# Patient Record
Sex: FEMALE | Race: BLACK OR AFRICAN AMERICAN | Employment: OTHER | URBAN - METROPOLITAN AREA
[De-identification: names, ages, dates, MRNs, and addresses within clinical notes are randomized per-mention and may not be internally consistent; named-entity substitution may affect disease eponyms.]

---

## 2017-01-25 ENCOUNTER — OFFICE VISIT (OUTPATIENT)
Dept: CARDIOLOGY CLINIC | Age: 82
End: 2017-01-25

## 2017-01-25 VITALS
HEART RATE: 64 BPM | DIASTOLIC BLOOD PRESSURE: 68 MMHG | WEIGHT: 104 LBS | HEIGHT: 60 IN | OXYGEN SATURATION: 99 % | SYSTOLIC BLOOD PRESSURE: 157 MMHG | BODY MASS INDEX: 20.42 KG/M2

## 2017-01-25 DIAGNOSIS — R26.81 GAIT INSTABILITY: ICD-10-CM

## 2017-01-25 DIAGNOSIS — K21.00 GASTROESOPHAGEAL REFLUX DISEASE WITH ESOPHAGITIS: ICD-10-CM

## 2017-01-25 DIAGNOSIS — E78.00 HYPERCHOLESTEREMIA: ICD-10-CM

## 2017-01-25 DIAGNOSIS — I10 ESSENTIAL HYPERTENSION: ICD-10-CM

## 2017-01-25 DIAGNOSIS — R00.2 PALPITATIONS: Primary | ICD-10-CM

## 2017-01-25 NOTE — PROGRESS NOTES
[facility]   [address] [facility], [address] [place] [address]                                          NEW PATIENT HPI/FOLLOW-UP      NAME:  Si Primer   :   1935   MRN:   [id number]   PCP:  Stacy Luis MD           Subjective: The patient is a [de-identified]y.o. year old female  who returns for a routine follow-up. Since the last visit, patient reports no new symptoms. Denies change in exercise tolerance, chest pain, edema, medication intolerance, palpitations, shortness of breath, PND/orthopnea wheezing, sputum, syncope, dizziness or light headedness. Doing satisfactorily. Review of Systems  General: Pt denies excessive weight gain or loss. Pt is able to conduct ADL's. Respiratory: Denies shortness of breath, EVANS, wheezing or stridor.   Cardiovascular: Denies precordial pain, +palpitations, edema or PND  Gastrointestinal: Denies poor appetite, indigestion, abdominal pain or blood in stool  Peripheral vascular: Denies claudication, leg cramps  Neuropsychiatric: Denies paresthesias,tingling,numbness,anxiety,depression,fatigue  Musculoskeletal: Denies pain,tenderness, soreness,swelling      Past Medical History   Diagnosis Date    GERD (gastroesophageal reflux disease)      EGD- reflux esophagitis    Hypercholesterolemia     Hypertension     Osteopenia 11/15/2011    Pulmonary embolus (HCC)      hx. of PE after surgery    Thyroid disease      Patient Active Problem List    Diagnosis Date Noted    Rapid palpitations 2016    Gait instability--uses roller walker 2016    SBO (small bowel obstruction) ([address] 2013    Esophageal mass 2013    Osteopenia 11/15/2011    Hernia of unspecified site of abdominal cavity without mention of obstruction or gangrene 2011    Respiratory failure ([address] 2010    Hypertension 2010    Hypercholesteremia 2010    Pulmonary embolus ([address] 2010    GERD (gastroesophageal reflux disease) Past Surgical History   Procedure Laterality Date    Hx hysterectomy      Pr freeing bowel adhesion,enterolysis      Pr repair ing hernia,5+y/o,reducibl  5/11/11    Colonoscopy       -internal hemorrhoids    Hx gi       bowel obstruction 12-    Hx small bowel resection       4-18-10    Hx hernia repair  5/11/11    Hx orthopaedic  2011     toe sx-bilateral     Allergies   Allergen Reactions    Hydrochlorothiazide Other (comments)     dizziness      No family history on file. Social History     Social History    Marital status:      Spouse name: N/A    Number of children: N/A    Years of education: N/A     Occupational History    Not on file. Social History Main Topics    Smoking status: Never Smoker    Smokeless tobacco: Never Used    Alcohol use No    Drug use: No    Sexual activity: Not on file     Other Topics Concern    Not on file     Social History Narrative    Currently lives by herself, brought in by sister. Sister lives 5 minutes away. Current Outpatient Prescriptions   Medication Sig    losartan (COZAAR) 50 mg tablet Take 1 Tab by mouth daily. For blood pressure    levothyroxine (SYNTHROID) 50 mcg tablet take 1 tablet by mouth once daily    indapamide (LOZOL) 2.5 mg tablet take 1 tablet by mouth once daily for blood pressure    atorvastatin (LIPITOR) 20 mg tablet take 1 tablet by mouth daily for cholesterol    alendronate (FOSAMAX) 70 mg tablet take 1 tablet by mouth EVERY MONDAY ON AN EMPTY STOMACH WITH 8 OZ OF WATER. REMAIN UPRIGHT FOR 30 MINUTES AFTERWARDS    SORE THROAT, BENZOCAINE-MENTH, 15-2.6 mg lozg lozenge as needed.     verapamil ER (CALAN-SR) 240 mg CR tablet take 1 tablet by mouth once daily for blood pressure    omeprazole (PRILOSEC) 40 mg capsule take 1 capsule by mouth once daily  FOR INDIGESTION    HI-CINTHIA PLUS VIT D 500 mg(1,250mg) -200 unit per tablet take 1 tablet by mouth twice a day    multivitamins-ca-iron-minerals (ONE DAILY) Tab Take one tablet by mouth once daily. No current facility-administered medications for this visit. I have reviewed the nurses notes, vitals, problem list, allergy list, medical history, family medical, social history and medications. Objective:     Physical Exam:     Vitals:    01/25/17 1018   BP: 157/68   Pulse: 64   SpO2: 99%   Weight: 104 lb (47.2 kg)   Height: 5' (1.524 m)    Body mass index is 20.31 kg/(m^2). General: Well developed, in no acute distress,in walker,wheelchair. HEENT: No carotid bruits, no JVD, trach is midline. Heart:  Normal S1/S2 negative S3 or S4. Regular, no murmur, gallop or rub.   Respiratory: Clear bilaterally, no wheezing or rales  Abdomen:   Soft, non-tender, bowel sounds are active.   Extremities:  No edema, normal cap refill, no cyanosis. Neuro: A&Ox3, speech clear, gait stable. Skin: Skin color is normal. No rashes or lesions. No diaphoresis.   Vascular: 2+ pulses symmetric in all extremities        Data Review:       Cardiographics:    Cardiology Labs:    Results for orders placed or performed during the hospital encounter of 03/31/13   EKG, 12 LEAD, INITIAL   Result Value Ref Range    Ventricular Rate 78 BPM    Atrial Rate 78 BPM    P-R Interval 114 ms    QRS Duration 92 ms    Q-T Interval 374 ms    QTC Calculation (Bezet) 426 ms    Calculated R Axis 62 degrees    Calculated T Axis 34 degrees    Diagnosis       Normal sinus rhythm  When compared with ECG of 19-APR-2010 10:27,  Sinus rhythm has replaced Junctional rhythm  Confirmed by Brook Escobar ([id number]) on 4/1/2013 1:05:06 PM   Results for orders placed or performed in visit on 03/22/12   AMB POC EKG ROUTINE W/ 12 LEADS, INTER & REP    Impression    Pvcs, nsr       Lab Results   Component Value Date/Time    Cholesterol, total 182 07/15/2016 11:06 AM    HDL Cholesterol 122 07/15/2016 11:06 AM    LDL, calculated 48 07/15/2016 11:06 AM    Triglyceride 59 07/15/2016 11:06 AM    CHOL/HDL Ratio 2.5 01/30/2009 03:11 PM       Lab Results   Component Value Date/Time    Sodium 139 07/15/2016 11:06 AM    Potassium 4.5 07/15/2016 11:06 AM    Chloride 98 07/15/2016 11:06 AM    CO2 25 07/15/2016 11:06 AM    Anion gap 11 04/10/2013 05:30 AM    Glucose 97 07/15/2016 11:06 AM    BUN 22 07/15/2016 11:06 AM    Creatinine 0.95 07/15/2016 11:06 AM    BUN/Creatinine ratio 23 07/15/2016 11:06 AM    GFR est AA 65 07/15/2016 11:06 AM    GFR est non-AA 57 07/15/2016 11:06 AM    Calcium 9.6 07/15/2016 11:06 AM    Bilirubin, total 0.2 07/15/2016 11:06 AM    ALT 23 07/15/2016 11:06 AM    AST 28 07/15/2016 11:06 AM    Alk. phosphatase 82 07/15/2016 11:06 AM    Protein, total 7.1 07/15/2016 11:06 AM    Albumin 4.1 07/15/2016 11:06 AM    Globulin 4.0 04/01/2013 02:35 AM    A-G Ratio 1.4 07/15/2016 11:06 AM          Assessment:     No diagnosis found. Discussion: Patient presents at this time stable from a cardiac perspective. Appropriate HR response to activity unchanged. Reassured. Pleased with present status. Plan: 1. Continue same meds. Lipid profile and labs followed by PCP. 2.Encouraged to exercise to tolerance, lose weight and follow low fat, low cholesterol, low sodium predominantly Plant-based (consider Mediterranean) diet. Call with questions or concerns. Will follow up any test results by phone and/or f/u here in office if needed. Lisa Sneed 3.Follow up: 1 month    I have discussed the diagnosis with the patient and the intended plan as seen in the above orders. The patient has received an after-visit summary and questions were answered concerning future plans. I have discussed any concerning medication side effects and warnings with the patient as well.     Stacy Ramíerz MD  1/25/2017

## 2017-01-25 NOTE — MR AVS SNAPSHOT
Visit Information Date & Time Provider Department Dept. Phone Encounter #  
 1/25/2017 10:15 AM Maricruz Weinstein MD [facility] [place] [address] [id number] Your Appointments 3/29/2017  9:30 AM  
ROUTINE CARE with Andrae Markham MD  
[facility]) Appt Note: 4 month follow up  
 [address] [facility] [id number] [phone] [address] [place] [address] [address] Upcoming Health Maintenance Date Due  
 GLAUCOMA SCREENING Q2Y 8/1/2016 MEDICARE YEARLY EXAM 1/15/2017 DTaP/Tdap/Td series (2 - Td) 6/22/2025 Allergies as of 1/25/2017  Review Complete On: 1/25/2017 By: Laurie Yoder Severity Noted Reaction Type Reactions Hydrochlorothiazide  03/01/2010    Other (comments)  
 dizziness Current Immunizations  Reviewed on 1/15/2016 Name Date Influenza High Dose Vaccine PF 9/28/2015, 10/2/2014 Influenza Vaccine 9/14/2016, 9/9/2013 Influenza Vaccine Split 9/24/2012, 9/19/2011, 9/21/2010, 1/1/2009 Pneumococcal Conjugate (PCV-13) 10/26/2016 Pneumococcal Vaccine (Unspecified Type) 1/1/2009 Tdap 6/22/2015 Varicella Virus Vaccine 11/10/2015 Not reviewed this visit Vitals BP Pulse Height(growth percentile) Weight(growth percentile) SpO2 BMI  
 157/68 64 5' (1.524 m) 104 lb (47.2 kg) 99% 20.31 kg/m2 OB Status Smoking Status Hysterectomy Never Smoker Vitals History BMI and BSA Data Body Mass Index Body Surface Area  
 20.31 kg/m 2 1.41 m 2 Preferred Pharmacy Pharmacy Name Phone [phone] [place] [phone] Your Updated Medication List  
  
   
This list is accurate as of: 1/25/17 11:00 AM.  Always use your most recent med list.  
  
  
  
  
 alendronate 70 mg tablet Commonly known as:  FOSAMAX take 1 tablet by mouth EVERY MONDAY ON AN EMPTY STOMACH WITH 8 OZ OF WATER. REMAIN UPRIGHT FOR 30 MINUTES AFTERWARDS  
  
 atorvastatin 20 mg tablet Commonly known as:  LIPITOR  
take 1 tablet by mouth daily for cholesterol HI-CINTHIA PLUS VIT D 500 mg(1,250mg) -200 unit per tablet Generic drug:  calcium-vitamin D  
take 1 tablet by mouth twice a day  
  
 indapamide 2.5 mg tablet Commonly known as:  LOZOL  
take 1 tablet by mouth once daily for blood pressure  
  
 levothyroxine 50 mcg tablet Commonly known as:  SYNTHROID  
take 1 tablet by mouth once daily  
  
 losartan 50 mg tablet Commonly known as:  COZAAR Take 1 Tab by mouth daily. For blood pressure  
  
 multivitamins-ca-iron-minerals Tab Commonly known as:  ONE DAILY Take one tablet by mouth once daily. omeprazole 40 mg capsule Commonly known as:  PRILOSEC  
take 1 capsule by mouth once daily  FOR INDIGESTION  
  
 SORE THROAT (BENZOCAINE-MENTH) 15-2.6 mg Lozg lozenge Generic drug:  benzocaine-menthol  
as needed. verapamil  mg CR tablet Commonly known as:  CALAN-SR  
take 1 tablet by mouth once daily for blood pressure Introducing [facility]! Velia Thomason introduces [employer] patient portal. Now you can access parts of your medical record, email your doctor's office, and request medication refills online. 1. In your internet browser, go to [email or web address] 2. Click on the First Time User? Click Here link in the Sign In box. You will see the New Member Sign Up page. 3. Enter your [employer] Access Code exactly as it appears below. You will not need to use this code after youve completed the sign-up process. If you do not sign up before the expiration date, you must request a new code. · [employer] Access Code: [email or web address] Expires: 2/26/2017  9:48 AM 
 
4.  Enter the last four digits of your Social Security Number (xxxx) and Date of Birth (mm/dd/yyyy) as indicated and click Submit. You will be taken to the next sign-up page. 5. Create a Mindshapes ID. This will be your Mindshapes login ID and cannot be changed, so think of one that is secure and easy to remember. 6. Create a Mindshapes password. You can change your password at any time. 7. Enter your Password Reset Question and Answer. This can be used at a later time if you forget your password. 8. Enter your e-mail address. You will receive e-mail notification when new information is available in [address] 9. Click Sign Up. You can now view and download portions of your medical record. 10. Click the Download Summary menu link to download a portable copy of your medical information. If you have questions, please visit the Frequently Asked Questions section of the Mindshapes website. Remember, Mindshapes is NOT to be used for urgent needs. For medical emergencies, dial 911. Now available from your iPhone and Android! Please provide this summary of care documentation to your next provider. Your primary care clinician is listed as Pablo Seats. If you have any questions after today's visit, please call [phone].

## 2017-02-13 RX ORDER — VERAPAMIL HYDROCHLORIDE 240 MG/1
TABLET, FILM COATED, EXTENDED RELEASE ORAL
Qty: 90 TAB | Refills: 11 | Status: SHIPPED | OUTPATIENT
Start: 2017-02-13 | End: 2018-05-20 | Stop reason: SDUPTHER

## 2017-03-29 ENCOUNTER — OFFICE VISIT (OUTPATIENT)
Dept: FAMILY MEDICINE CLINIC | Age: 82
End: 2017-03-29

## 2017-03-29 VITALS
HEIGHT: 60 IN | RESPIRATION RATE: 14 BRPM | DIASTOLIC BLOOD PRESSURE: 60 MMHG | HEART RATE: 65 BPM | WEIGHT: 118.6 LBS | BODY MASS INDEX: 23.29 KG/M2 | TEMPERATURE: 98.4 F | SYSTOLIC BLOOD PRESSURE: 141 MMHG | OXYGEN SATURATION: 99 %

## 2017-03-29 DIAGNOSIS — I10 ESSENTIAL HYPERTENSION: ICD-10-CM

## 2017-03-29 DIAGNOSIS — E78.00 HYPERCHOLESTEREMIA: ICD-10-CM

## 2017-03-29 DIAGNOSIS — Z00.00 ENCOUNTER FOR MEDICARE ANNUAL WELLNESS EXAM: Primary | ICD-10-CM

## 2017-03-29 LAB
BILIRUB UR QL STRIP: NEGATIVE
GLUCOSE UR-MCNC: NEGATIVE MG/DL
KETONES P FAST UR STRIP-MCNC: NEGATIVE MG/DL
PH UR STRIP: 6.5 [PH] (ref 4.6–8)
PROT UR QL STRIP: NEGATIVE MG/DL
SP GR UR STRIP: 1 (ref 1–1.03)
UA UROBILINOGEN AMB POC: NORMAL (ref 0.2–1)
URINALYSIS CLARITY POC: CLEAR
URINALYSIS COLOR POC: YELLOW
URINE BLOOD POC: NEGATIVE
URINE LEUKOCYTES POC: NEGATIVE
URINE NITRITES POC: NEGATIVE

## 2017-03-29 RX ORDER — LOSARTAN POTASSIUM 100 MG/1
100 TABLET ORAL DAILY
Qty: 90 TAB | Refills: 3 | Status: SHIPPED | OUTPATIENT
Start: 2017-03-29 | End: 2017-07-27 | Stop reason: SDUPTHER

## 2017-03-29 NOTE — ACP (ADVANCE CARE PLANNING)
Pt still would like to be on vent and have feeding tubes if became terminally ill. Sister Onel Piper would be the person pt would like to make her medical decisions for her. If something happened to Onel Piper, her daughter, Venancio Barton would be next person in line to make decisions.

## 2017-03-29 NOTE — PROGRESS NOTES
Chief Complaint   Patient presents with    Hypertension    Cholesterol Problem    GERD    Thyroid Problem     1. Have you been to the ER, urgent care clinic since your last visit? Hospitalized since your last visit? No    2. Have you seen or consulted any other health care providers outside of the [address] [place] [address] since your last visit? Include any pap smears or colon screening.  No     Health Maintenance Due   Topic Date Due    GLAUCOMA SCREENING Q2Y  08/01/2016    MEDICARE YEARLY EXAM  01/15/2017

## 2017-03-29 NOTE — MR AVS SNAPSHOT
Visit Information Date & Time Provider Department Dept. Phone Encounter #  
 3/29/2017  9:30 AM Marisela Wooten MD [facility] [phone] [id number] Follow-up Instructions Return in about 6 months (around 9/29/2017). Your Appointments 7/26/2017 10:15 AM  
ESTABLISHED PATIENT with Geena Johnson MD  
[facility]) Appt Note: 6 MO. F/U  
 [address]  
[phone] [address] Upcoming Health Maintenance Date Due  
 GLAUCOMA SCREENING Q2Y 8/1/2016 MEDICARE YEARLY EXAM 1/15/2017 DTaP/Tdap/Td series (2 - Td) 6/22/2025 Allergies as of 3/29/2017  Review Complete On: 3/29/2017 By: Marisela Wooten MD  
  
 Severity Noted Reaction Type Reactions Hydrochlorothiazide  03/01/2010    Other (comments)  
 dizziness Current Immunizations  Reviewed on 1/15/2016 Name Date Influenza High Dose Vaccine PF 9/28/2015, 10/2/2014 Influenza Vaccine 9/14/2016, 9/9/2013 Influenza Vaccine Split 9/24/2012, 9/19/2011, 9/21/2010, 1/1/2009 Pneumococcal Conjugate (PCV-13) 10/26/2016 Pneumococcal Vaccine (Unspecified Type) 1/1/2009 Tdap 6/22/2015 Varicella Virus Vaccine 11/10/2015 Not reviewed this visit You Were Diagnosed With   
  
 Codes Comments Encounter for Medicare annual wellness exam    -  Primary ICD-10-CM: Z00.00 ICD-9-CM: V70.0 Essential hypertension     ICD-10-CM: I10 
ICD-9-CM: 401.9 Hypercholesteremia     ICD-10-CM: E78.00 ICD-9-CM: 272.0 Vitals BP Pulse Temp Resp Height(growth percentile) Weight(growth percentile) 141/60 65 98.4 °F (36.9 °C) (Oral) 14 5' (1.524 m) 118 lb 9.6 oz (53.8 kg) SpO2 BMI OB Status Smoking Status 99% 23.16 kg/m2 Hysterectomy Never Smoker Vitals History BMI and BSA Data Body Mass Index Body Surface Area  
 23.16 kg/m 2 1.51 m 2 Preferred Pharmacy Pharmacy Name Phone [phone] [phone] Your Updated Medication List  
  
   
This list is accurate as of: 3/29/17 10:14 AM.  Always use your most recent med list.  
  
  
  
  
 alendronate 70 mg tablet Commonly known as:  FOSAMAX  
take 1 tablet by mouth EVERY MONDAY ON AN EMPTY STOMACH WITH 8 OZ OF WATER. REMAIN UPRIGHT FOR 30 MINUTES AFTERWARDS  
  
 atorvastatin 20 mg tablet Commonly known as:  LIPITOR  
take 1 tablet by mouth daily for cholesterol HI-CINTHIA PLUS VIT D 500 mg(1,250mg) -200 unit per tablet Generic drug:  calcium-vitamin D  
take 1 tablet by mouth twice a day  
  
 indapamide 2.5 mg tablet Commonly known as:  LOZOL  
take 1 tablet by mouth once daily for blood pressure  
  
 levothyroxine 50 mcg tablet Commonly known as:  SYNTHROID  
take 1 tablet by mouth once daily  
  
 losartan 100 mg tablet Commonly known as:  COZAAR Take 1 Tab by mouth daily. For blood pressure  
  
 multivitamins-ca-iron-minerals Tab Commonly known as:  ONE DAILY Take one tablet by mouth once daily. omeprazole 40 mg capsule Commonly known as:  PRILOSEC  
take 1 capsule by mouth once daily  FOR INDIGESTION  
  
 SORE THROAT (BENZOCAINE-MENTH) 15-2.6 mg Lozg lozenge Generic drug:  benzocaine-menthol  
as needed. verapamil  mg CR tablet Commonly known as:  CALAN-SR  
take 1 tablet by mouth once daily for blood pressure Prescriptions Sent to Pharmacy Refills  
 losartan (COZAAR) 100 mg tablet 3 Sig: Take 1 Tab by mouth daily. For blood pressure Class: Normal  
 Pharmacy: [facility] [address] [place], [place] [address] [address] Ph #: [phone] Route: Oral  
  
We Performed the Following AMB POC URINALYSIS DIP STICK AUTO W/ MICRO [44352 CPT(R)] CBC WITH AUTOMATED DIFF [34265 CPT(R)] LIPID PANEL [04387 CPT(R)] METABOLIC PANEL, COMPREHENSIVE [38130 CPT(R)] Follow-up Instructions Return in about 6 months (around 9/29/2017). Introducing [address]! Nichole Ward introduces [employer] patient portal. Now you can access parts of your medical record, email your doctor's office, and request medication refills online. 1. In your internet browser, go to [email or web address] 2. Click on the First Time User? Click Here link in the Sign In box. You will see the New Member Sign Up page. 3. Enter your Webb[employer] Access Code exactly as it appears below. You will not need to use this code after youve completed the sign-up process. If you do not sign up before the expiration date, you must request a new code. · [employer] Access Code: [email or web address] Expires: 6/27/2017 10:14 AM 
 
4. Enter the last four digits of your Social Security Number (xxxx) and Date of Birth (mm/dd/yyyy) as indicated and click Submit. You will be taken to the next sign-up page. 5. Create a Webbynode ID. This will be your Webbynode login ID and cannot be changed, so think of one that is secure and easy to remember. 6. Create a Webbynode password. You can change your password at any time. 7. Enter your Password Reset Question and Answer. This can be used at a later time if you forget your password. 8. Enter your e-mail address. You will receive e-mail notification when new information is available in [address] 9. Click Sign Up. You can now view and download portions of your medical record. 10. Click the Download Summary menu link to download a portable copy of your medical information. If you have questions, please visit the Frequently Asked Questions section of the Webb[employer] website. Remember, Webb[employer]ode is NOT to be used for urgent needs. For medical emergencies, dial 911. Now available from your iPhone and Android! Please provide this summary of care documentation to your next provider. Your primary care clinician is listed as Ivy Fournier. If you have any questions after today's visit, please call [phone].

## 2017-03-29 NOTE — PROGRESS NOTES
HISTORY OF PRESENT ILLNESS  Alex Farris is a [de-identified] y.o. female. HPI In for medicare wellness exam. Needs blood pressure and cholesterol check. Going for surgery on L foot on April 19. Review of Systems   Constitutional: Negative for malaise/fatigue and weight loss. HENT: Negative for hearing loss. Respiratory: Negative for cough and shortness of breath. Nonsmoker   Cardiovascular: Negative for chest pain and orthopnea. Gastrointestinal: Negative for abdominal pain and blood in stool. Genitourinary: Negative for frequency and hematuria. Skin: Negative for itching and rash. Neurological: Negative for focal weakness, loss of consciousness and headaches. Endo/Heme/Allergies: Negative for polydipsia. Does not bruise/bleed easily. Psychiatric/Behavioral: Negative for depression. The patient is not nervous/anxious and does not have insomnia. No alcohol. Lives by herself, sister checks on her everyday. Physical Exam   Constitutional: She is oriented to person, place, and time. She appears well-developed and well-nourished. Neck: No thyromegaly present. Cardiovascular: Normal rate, regular rhythm and normal heart sounds. No murmur heard. Pulmonary/Chest: Effort normal and breath sounds normal. She has no wheezes. Br- no mass   Abdominal: Soft. Bowel sounds are normal. She exhibits no distension. There is no tenderness. There is no rebound and no guarding. Musculoskeletal: Normal range of motion. She exhibits no edema. Lymphadenopathy:     She has no cervical adenopathy. Neurological: She is alert and oriented to person, place, and time. Nursing note and vitals reviewed.       ASSESSMENT and PLAN  Orders Placed This Encounter    METABOLIC PANEL, COMPREHENSIVE    LIPID PANEL    CBC WITH AUTOMATED DIFF    AMB POC URINALYSIS DIP STICK AUTO W/ MICRO    losartan (COZAAR) 100 mg tablet     Alex was seen today for hypertension, cholesterol problem, gerd and thyroid problem. Diagnoses and all orders for this visit:    Encounter for Medicare annual wellness exam  -     METABOLIC PANEL, COMPREHENSIVE  -     CBC WITH AUTOMATED DIFF    Essential hypertension  -     AMB POC URINALYSIS DIP STICK AUTO W/ MICRO    Hypercholesteremia  -     LIPID PANEL    Other orders  -     losartan (COZAAR) 100 mg tablet; Take 1 Tab by mouth daily. For blood pressure      Follow-up Disposition:  Return in about 6 months (around 9/29/2017).

## 2017-03-30 LAB
ALBUMIN SERPL-MCNC: 4.2 G/DL (ref 3.5–4.7)
ALBUMIN/GLOB SERPL: 1.3 {RATIO} (ref 1.2–2.2)
ALP SERPL-CCNC: 74 IU/L (ref 39–117)
ALT SERPL-CCNC: 16 IU/L (ref 0–32)
AST SERPL-CCNC: 24 IU/L (ref 0–40)
BASOPHILS # BLD AUTO: 0 X10E3/UL (ref 0–0.2)
BASOPHILS NFR BLD AUTO: 1 %
BILIRUB SERPL-MCNC: <0.2 MG/DL (ref 0–1.2)
BUN SERPL-MCNC: 19 MG/DL (ref 8–27)
BUN/CREAT SERPL: 21 (ref 11–26)
CALCIUM SERPL-MCNC: 9.7 MG/DL (ref 8.7–10.3)
CHLORIDE SERPL-SCNC: 99 MMOL/L (ref 96–106)
CHOLEST SERPL-MCNC: 230 MG/DL (ref 100–199)
CO2 SERPL-SCNC: 24 MMOL/L (ref 18–29)
CREAT SERPL-MCNC: 0.9 MG/DL (ref 0.57–1)
EOSINOPHIL # BLD AUTO: 0.1 X10E3/UL (ref 0–0.4)
EOSINOPHIL NFR BLD AUTO: 2 %
ERYTHROCYTE [DISTWIDTH] IN BLOOD BY AUTOMATED COUNT: 14.6 % (ref 12.3–15.4)
GLOBULIN SER CALC-MCNC: 3.2 G/DL (ref 1.5–4.5)
GLUCOSE SERPL-MCNC: 89 MG/DL (ref 65–99)
HCT VFR BLD AUTO: 34.3 % (ref 34–46.6)
HDLC SERPL-MCNC: 124 MG/DL
HGB BLD-MCNC: 11.2 G/DL (ref 11.1–15.9)
IMM GRANULOCYTES # BLD: 0 X10E3/UL (ref 0–0.1)
IMM GRANULOCYTES NFR BLD: 0 %
INTERPRETATION, 910389: NORMAL
LDLC SERPL CALC-MCNC: 89 MG/DL (ref 0–99)
LYMPHOCYTES # BLD AUTO: 2.5 X10E3/UL (ref 0.7–3.1)
LYMPHOCYTES NFR BLD AUTO: 41 %
MCH RBC QN AUTO: 27.4 PG (ref 26.6–33)
MCHC RBC AUTO-ENTMCNC: 32.7 G/DL (ref 31.5–35.7)
MCV RBC AUTO: 84 FL (ref 79–97)
MONOCYTES # BLD AUTO: 0.3 X10E3/UL (ref 0.1–0.9)
MONOCYTES NFR BLD AUTO: 5 %
NEUTROPHILS # BLD AUTO: 3.1 X10E3/UL (ref 1.4–7)
NEUTROPHILS NFR BLD AUTO: 51 %
PLATELET # BLD AUTO: 253 X10E3/UL (ref 150–379)
POTASSIUM SERPL-SCNC: 4.8 MMOL/L (ref 3.5–5.2)
PROT SERPL-MCNC: 7.4 G/DL (ref 6–8.5)
RBC # BLD AUTO: 4.09 X10E6/UL (ref 3.77–5.28)
SODIUM SERPL-SCNC: 139 MMOL/L (ref 134–144)
TRIGL SERPL-MCNC: 83 MG/DL (ref 0–149)
VLDLC SERPL CALC-MCNC: 17 MG/DL (ref 5–40)
WBC # BLD AUTO: 6 X10E3/UL (ref 3.4–10.8)

## 2017-04-26 RX ORDER — OMEPRAZOLE 40 MG/1
CAPSULE, DELAYED RELEASE ORAL
Qty: 90 CAP | Refills: 12 | Status: SHIPPED | OUTPATIENT
Start: 2017-04-26 | End: 2018-07-11 | Stop reason: SDUPTHER

## 2017-05-08 RX ORDER — ALENDRONATE SODIUM 70 MG/1
TABLET ORAL
Qty: 4 TAB | Refills: 13 | Status: SHIPPED | OUTPATIENT
Start: 2017-05-08 | End: 2018-05-08 | Stop reason: SDUPTHER

## 2017-06-05 RX ORDER — DOCUSATE SODIUM 50 MG
CAPSULE ORAL
Qty: 90 TAB | Status: SHIPPED | OUTPATIENT
Start: 2017-06-05 | End: 2018-06-18 | Stop reason: SDUPTHER

## 2017-07-26 ENCOUNTER — OFFICE VISIT (OUTPATIENT)
Dept: CARDIOLOGY CLINIC | Age: 82
End: 2017-07-26

## 2017-07-26 VITALS
TEMPERATURE: 96.8 F | RESPIRATION RATE: 16 BRPM | DIASTOLIC BLOOD PRESSURE: 58 MMHG | WEIGHT: 112.8 LBS | BODY MASS INDEX: 22.15 KG/M2 | SYSTOLIC BLOOD PRESSURE: 153 MMHG | HEIGHT: 60 IN | HEART RATE: 63 BPM | OXYGEN SATURATION: 99 %

## 2017-07-26 DIAGNOSIS — R26.81 GAIT INSTABILITY: ICD-10-CM

## 2017-07-26 DIAGNOSIS — K21.00 GASTROESOPHAGEAL REFLUX DISEASE WITH ESOPHAGITIS: ICD-10-CM

## 2017-07-26 DIAGNOSIS — E78.00 HYPERCHOLESTEREMIA: ICD-10-CM

## 2017-07-26 DIAGNOSIS — R00.2 PALPITATIONS: ICD-10-CM

## 2017-07-26 DIAGNOSIS — I10 ESSENTIAL HYPERTENSION: Primary | ICD-10-CM

## 2017-07-26 RX ORDER — CHLORTHALIDONE 25 MG/1
12.5 TABLET ORAL DAILY
Qty: 30 TAB | Refills: 6 | Status: SHIPPED | OUTPATIENT
Start: 2017-07-26 | End: 2017-07-26 | Stop reason: CLARIF

## 2017-07-26 NOTE — PROGRESS NOTES
[facility]   [address] [facility], [address] [place] [address]                                          NEW PATIENT HPI/FOLLOW-UP      NAME:  Radha Mane   :   1935   MRN:   [id number]   PCP:  Janeth Katz MD           Subjective: The patient is a 81y.o. year old female  who returns for a routine follow-up. Since the last visit, patient reports occasional palpitations that are brief and self-limited. Not associated with other sx. Denies change in exercise tolerance, chest pain, edema, medication intolerance, , shortness of breath, PND/orthopnea wheezing, sputum, syncope, dizziness or light headedness. Doing satisfactorily. Review of Systems  General: Pt denies excessive weight gain or loss. Pt is able to conduct ADL's. Respiratory: Denies shortness of breath, EVANS, wheezing or stridor.   Cardiovascular: Denies precordial pain, palpitations, edema or PND  Gastrointestinal: Denies poor appetite, indigestion, abdominal pain or blood in stool  Peripheral vascular: Denies claudication, leg cramps  Neuropsychiatric: Denies paresthesias,tingling,numbness,anxiety,depression,fatigue  Musculoskeletal: Denies pain,tenderness, soreness,swelling      Past Medical History:   Diagnosis Date    GERD (gastroesophageal reflux disease)     EGD- reflux esophagitis    Hypercholesterolemia     Hypertension     Osteopenia 11/15/2011    Pulmonary embolus (HCC)     hx. of PE after surgery    Thyroid disease      Patient Active Problem List    Diagnosis Date Noted    Palpitations 2016    Gait instability--uses roller walker 2016    SBO (small bowel obstruction) ([address] 2013    Esophageal mass 2013    Osteopenia 11/15/2011    Hernia of unspecified site of abdominal cavity without mention of obstruction or gangrene 2011    Respiratory failure ([address] 2010    Hypertension 2010    Hypercholesteremia 2010    Pulmonary embolus ([address] 03/01/2010    GERD (gastroesophageal reflux disease)       Past Surgical History:   Procedure Laterality Date    COLONOSCOPY      -internal hemorrhoids    HX GI      bowel obstruction 12-    HX HERNIA REPAIR  5/11/11    HX HYSTERECTOMY      HX ORTHOPAEDIC  2011    toe sx-bilateral    HX SMALL BOWEL RESECTION      4-18-10    WI FREEING BOWEL ADHESION,ENTEROLYSIS      REPAIR ING HERNIA,5+Y/O,REDUCIBL  5/11/11     Allergies   Allergen Reactions    Hydrochlorothiazide Other (comments)     dizziness      History reviewed. No pertinent family history. Social History     Social History    Marital status:      Spouse name: N/A    Number of children: N/A    Years of education: N/A     Occupational History    Not on file. Social History Main Topics    Smoking status: Never Smoker    Smokeless tobacco: Never Used    Alcohol use No    Drug use: No    Sexual activity: Not on file     Other Topics Concern    Not on file     Social History Narrative    Currently lives by herself, brought in by sister. Sister lives 5 minutes away. Current Outpatient Prescriptions   Medication Sig    CYANOCOBALAMIN, VITAMIN B-12, (VITAMIN B-12 PO)     chlorthalidone (HYGROTEN) 25 mg tablet Take 0.5 Tabs by mouth daily. Indications: hypertension    alendronate (FOSAMAX) 70 mg tablet take 1 tablet by mouth every week on an empty stomach with 8 oz of water. Remain Upright FOR 30 MINUTES    omeprazole (PRILOSEC) 40 mg capsule take 1 capsule by mouth once daily for indigestion    losartan (COZAAR) 100 mg tablet Take 1 Tab by mouth daily.  For blood pressure    verapamil ER (CALAN-SR) 240 mg CR tablet take 1 tablet by mouth once daily for blood pressure    levothyroxine (SYNTHROID) 50 mcg tablet take 1 tablet by mouth once daily    indapamide (LOZOL) 2.5 mg tablet take 1 tablet by mouth once daily for blood pressure    atorvastatin (LIPITOR) 20 mg tablet take 1 tablet by mouth daily for cholesterol    SORE THROAT, BENZOCAINE-MENTH, 15-2.6 mg lozg lozenge as needed.  HI-CINTHIA PLUS VIT D 500 mg(1,250mg) -200 unit per tablet take 1 tablet by mouth twice a day    multivitamins-ca-iron-minerals (ONE DAILY) Tab Take one tablet by mouth once daily.  ONE DAILY ESSENTIAL 0.4 mg tab take 1 tablet by mouth daily     No current facility-administered medications for this visit. I have reviewed the nurses notes, vitals, problem list, allergy list, medical history, family medical, social history and medications. Objective:     Physical Exam:     Vitals:    07/26/17 1006 07/26/17 1016   BP: 165/64 153/58   Pulse: 63    Resp: 16    Temp: 96.8 °F (36 °C)    TempSrc: Oral    SpO2: 99%    Weight: 112 lb 12.8 oz (51.2 kg)    Height: 5' (1.524 m)     Body mass index is 22.03 kg/(m^2). General: WDWN adult female, in no acute distress. Frail, thin appearance  HEENT: No carotid bruits, no JVD, trach is midline. Heart:  Normal S1/S2 negative S3 or S4. Regular, no murmur, gallop or rub.   Respiratory: Clear bilaterally, no wheezing or rales  Abdomen:   Soft, non-tender, bowel sounds are active.   Extremities:  No edema, normal cap refill, no cyanosis. Neuro: A&Ox3, speech clear, gait assisted with walker  Skin: Skin color is normal. No rashes or lesions. No diaphoresis. Vascular: 2+ pulses symmetric in all extremities        Data Review:       Cardiographics:    EKG: NSR, ID interval normal. Axis normal. No ST segment changes.     Cardiology Labs:    Results for orders placed or performed during the hospital encounter of 03/31/13   EKG, 12 LEAD, INITIAL   Result Value Ref Range    Ventricular Rate 78 BPM    Atrial Rate 78 BPM    P-R Interval 114 ms    QRS Duration 92 ms    Q-T Interval 374 ms    QTC Calculation (Bezet) 426 ms    Calculated R Axis 62 degrees    Calculated T Axis 34 degrees    Diagnosis       Normal sinus rhythm  When compared with ECG of 19-APR-2010 10:27,  Sinus rhythm has replaced Junctional rhythm  Confirmed by Noah Mccullough ([id number]) on 4/1/2013 1:05:06 PM   Results for orders placed or performed in visit on 03/22/12   AMB POC EKG ROUTINE W/ 12 LEADS, INTER & REP    Impression    Pvcs, nsr       Lab Results   Component Value Date/Time    Cholesterol, total 230 03/29/2017 10:45 AM    HDL Cholesterol 124 03/29/2017 10:45 AM    LDL, calculated 89 03/29/2017 10:45 AM    Triglyceride 83 03/29/2017 10:45 AM    CHOL/HDL Ratio 2.5 01/30/2009 03:11 PM       Lab Results   Component Value Date/Time    Sodium 139 03/29/2017 10:45 AM    Potassium 4.8 03/29/2017 10:45 AM    Chloride 99 03/29/2017 10:45 AM    CO2 24 03/29/2017 10:45 AM    Anion gap 11 04/10/2013 05:30 AM    Glucose 89 03/29/2017 10:45 AM    BUN 19 03/29/2017 10:45 AM    Creatinine 0.90 03/29/2017 10:45 AM    BUN/Creatinine ratio 21 03/29/2017 10:45 AM    GFR est AA 70 03/29/2017 10:45 AM    GFR est non-AA 61 03/29/2017 10:45 AM    Calcium 9.7 03/29/2017 10:45 AM    Bilirubin, total <0.2 03/29/2017 10:45 AM    AST (SGOT) 24 03/29/2017 10:45 AM    Alk. phosphatase 74 03/29/2017 10:45 AM    Protein, total 7.4 03/29/2017 10:45 AM    Albumin 4.2 03/29/2017 10:45 AM    Globulin 4.0 04/01/2013 02:35 AM    A-G Ratio 1.3 03/29/2017 10:45 AM    ALT (SGPT) 16 03/29/2017 10:45 AM          Assessment:       ICD-10-CM ICD-9-CM    1. Essential hypertension I10 401.9 AMB POC EKG ROUTINE W/ 12 LEADS, INTER & REP      chlorthalidone (HYGROTEN) 25 mg tablet   2. Hypercholesteremia E78.00 272.0 chlorthalidone (HYGROTEN) 25 mg tablet   3. Palpitations R00.2 785.1 chlorthalidone (HYGROTEN) 25 mg tablet         Discussion: Patient presents at this time stable from a cardiac perspective. BP not at goal of 150/90, however, pt is frail and thin in appearance. Will be somewhat cautious in antihypertensive regimen. Encouraged pt to check and record BPs while out in the community to get a better idea about control before altering regimen.          1.Continue same meds. 2.Encouraged to exercise to tolerance, and follow low fat, low cholesterol, low sodium predominantly Plant-based (consider Mediterranean) diet. Call with questions or concerns. Will follow up any test results by phone and/or f/u here in office if needed. Max Cox 3.Follow up: 6 months    I have discussed the diagnosis with the patient and the intended plan as seen in the above orders. The patient has received an after-visit summary and questions were answered concerning future plans. I have discussed any concerning medication side effects and warnings with the patient as well. Patient seen and examined. All pertinent data reviewed. I have reviewed detailed note as outlined by Royal Zuniga. Case discussed with Nursing/medical assistant staff and Royal Zuniga. Patient cardiac stable. BP high normal to mildly elevated normally but appropriate for her. Will avoid aggressive Rx in view of her fragile state. Plans as outlined.       Jemma Bob PA-C  7/26/2017     GIOVANY Vo

## 2017-07-26 NOTE — MR AVS SNAPSHOT
Visit Information Date & Time Provider Department Dept. Phone Encounter #  
 7/26/2017 10:15 AM Mallory Zee MD [facility] [address] [id number] Your Appointments 9/28/2017  9:45 AM  
ROUTINE CARE with Candance Ngo, MD  
[facility]) Appt Note: 6m f/u  
 [phone] [phone] [address] [facility] [address] Upcoming Health Maintenance Date Due  
 GLAUCOMA SCREENING Q2Y 8/1/2016 INFLUENZA AGE 9 TO ADULT 8/1/2017 MEDICARE YEARLY EXAM 3/30/2018 DTaP/Tdap/Td series (2 - Td) 6/22/2025 Allergies as of 7/26/2017  Review Complete On: 7/26/2017 By: Jacob Becerra LPN Severity Noted Reaction Type Reactions Hydrochlorothiazide  03/01/2010    Other (comments)  
 dizziness Current Immunizations  Reviewed on 1/15/2016 Name Date Influenza High Dose Vaccine PF 9/28/2015, 10/2/2014 Influenza Vaccine 9/14/2016, 9/9/2013 Influenza Vaccine Split 9/24/2012, 9/19/2011, 9/21/2010, 1/1/2009 Pneumococcal Conjugate (PCV-13) 10/26/2016 Tdap 6/22/2015 Varicella Virus Vaccine 11/10/2015 ZZZ-RETIRED (DO NOT USE) Pneumococcal Vaccine (Unspecified Type) 1/1/2009 Not reviewed this visit You Were Diagnosed With   
  
 Codes Comments Essential hypertension    -  Primary ICD-10-CM: I10 
ICD-9-CM: 401.9 Hypercholesteremia     ICD-10-CM: E78.00 ICD-9-CM: 272.0 Palpitations     ICD-10-CM: R00.2 ICD-9-CM: 785.1 Vitals BP Pulse Temp Resp Height(growth percentile) Weight(growth percentile) 153/58 (BP 1 Location: Right arm, BP Patient Position: Sitting) 63 96.8 °F (36 °C) (Oral) 16 5' (1.524 m) 112 lb 12.8 oz (51.2 kg) SpO2 BMI OB Status Smoking Status 99% 22.03 kg/m2 Hysterectomy Never Smoker Vitals History BMI and BSA Data Body Mass Index Body Surface Area 22.03 kg/m 2 1.47 m 2 Preferred Pharmacy Pharmacy Name Phone [id number] [facility], [place] [address] Lila Reason [phone] Your Updated Medication List  
  
   
This list is accurate as of: 7/26/17 10:53 AM.  Always use your most recent med list.  
  
  
  
  
 alendronate 70 mg tablet Commonly known as:  FOSAMAX  
take 1 tablet by mouth every week on an empty stomach with 8 oz of water. Remain Upright FOR 30 MINUTES  
  
 atorvastatin 20 mg tablet Commonly known as:  LIPITOR  
take 1 tablet by mouth daily for cholesterol HI-CINTHIA PLUS VIT D 500 mg(1,250mg) -200 unit per tablet Generic drug:  calcium-vitamin D  
take 1 tablet by mouth twice a day  
  
 indapamide 2.5 mg tablet Commonly known as:  LOZOL  
take 1 tablet by mouth once daily for blood pressure  
  
 levothyroxine 50 mcg tablet Commonly known as:  SYNTHROID  
take 1 tablet by mouth once daily  
  
 losartan 100 mg tablet Commonly known as:  COZAAR Take 1 Tab by mouth daily. For blood pressure  
  
 multivitamins-ca-iron-minerals Tab Commonly known as:  ONE DAILY Take one tablet by mouth once daily. omeprazole 40 mg capsule Commonly known as:  PRILOSEC  
take 1 capsule by mouth once daily for indigestion ONE DAILY ESSENTIAL 0.4 mg Tab Generic drug:  Vit B Comp & C-Vit E-FA-Cee-Zn  
take 1 tablet by mouth daily SORE THROAT (BENZOCAINE-MENTH) 15-2.6 mg Lozg lozenge Generic drug:  benzocaine-menthol  
as needed. verapamil  mg CR tablet Commonly known as:  CALAN-SR  
take 1 tablet by mouth once daily for blood pressure VITAMIN B-12 PO We Performed the Following AMB POC EKG ROUTINE W/ 12 LEADS, INTER & REP [21343 CPT(R)] Introducing [facility]!    
 Oly Wiseman introduces [employer] patient portal. Now you can access parts of your medical record, email your doctor's office, and request medication refills online. 1. In your internet browser, go to [email or web address] 2. Click on the First Time User? Click Here link in the Sign In box. You will see the New Member Sign Up page. 3. Enter your [employer] Access Code exactly as it appears below. You will not need to use this code after youve completed the sign-up process. If you do not sign up before the expiration date, you must request a new code. · [employer] Access Code: [email or web address] Expires: 10/24/2017 10:38 AM 
 
4. Enter the last four digits of your Social Security Number (xxxx) and Date of Birth (mm/dd/yyyy) as indicated and click Submit. You will be taken to the next sign-up page. 5. Create a [employer] ID. This will be your [employer] login ID and cannot be changed, so think of one that is secure and easy to remember. 6. Create a [employer] password. You can change your password at any time. 7. Enter your Password Reset Question and Answer. This can be used at a later time if you forget your password. 8. Enter your e-mail address. You will receive e-mail notification when new information is available in [address] 9. Click Sign Up. You can now view and download portions of your medical record. 10. Click the Download Summary menu link to download a portable copy of your medical information. If you have questions, please visit the Frequently Asked Questions section of the [employer] website. Remember, [employer] is NOT to be used for urgent needs. For medical emergencies, dial 911. Now available from your iPhone and Android! Please provide this summary of care documentation to your next provider. Your primary care clinician is listed as Ruben Smions. If you have any questions after today's visit, please call [phone].

## 2017-07-26 NOTE — PROGRESS NOTES
Chief Complaint   Patient presents with    Hypertension     6m f/u     1. Have you been to the ER, urgent care clinic since your last visit? Hospitalized since your last visit? No    2. Have you seen or consulted any other health care providers outside of the [address] [place] [address] since your last visit? Include any pap smears or colon screening.  No

## 2017-07-27 DIAGNOSIS — I10 ESSENTIAL HYPERTENSION: Primary | ICD-10-CM

## 2017-07-27 RX ORDER — LOSARTAN POTASSIUM 100 MG/1
100 TABLET ORAL DAILY
Qty: 90 TAB | Refills: 3 | Status: SHIPPED | OUTPATIENT
Start: 2017-07-27 | End: 2018-07-25 | Stop reason: SDUPTHER

## 2017-09-28 ENCOUNTER — OFFICE VISIT (OUTPATIENT)
Dept: FAMILY MEDICINE CLINIC | Age: 82
End: 2017-09-28

## 2017-09-28 VITALS
HEIGHT: 60 IN | HEART RATE: 73 BPM | BODY MASS INDEX: 22.62 KG/M2 | DIASTOLIC BLOOD PRESSURE: 69 MMHG | RESPIRATION RATE: 16 BRPM | WEIGHT: 115.2 LBS | SYSTOLIC BLOOD PRESSURE: 173 MMHG | TEMPERATURE: 96.8 F

## 2017-09-28 DIAGNOSIS — E78.00 HYPERCHOLESTEREMIA: ICD-10-CM

## 2017-09-28 DIAGNOSIS — Z23 ENCOUNTER FOR IMMUNIZATION: ICD-10-CM

## 2017-09-28 DIAGNOSIS — I10 ESSENTIAL HYPERTENSION: Primary | ICD-10-CM

## 2017-09-28 RX ORDER — CLONIDINE HYDROCHLORIDE 0.1 MG/1
0.1 TABLET ORAL 2 TIMES DAILY
Qty: 60 TAB | Refills: 12 | Status: SHIPPED | OUTPATIENT
Start: 2017-09-28 | End: 2018-07-25 | Stop reason: ALTCHOICE

## 2017-09-28 NOTE — PATIENT INSTRUCTIONS
Vaccine Information Statement    Influenza (Flu) Vaccine (Inactivated or Recombinant): What you need to know    Many Vaccine Information Statements are available in Amharic and other languages. See www.immunize.org/vis  Hojas de Información Sobre Vacunas están disponibles en Español y en muchos otros idiomas. Visite www.immunize.org/vis    1. Why get vaccinated? Influenza (flu) is a contagious disease that spreads around the [place] every year, usually between October and May. Flu is caused by influenza viruses, and is spread mainly by coughing, sneezing, and close contact. Anyone can get flu. Flu strikes suddenly and can last several days. Symptoms vary by age, but can include:   fever/chills   sore throat   muscle aches   fatigue   cough   headache    runny or stuffy nose    Flu can also lead to pneumonia and blood infections, and cause diarrhea and seizures in children. If you have a medical condition, such as heart or lung disease, flu can make it worse. Flu is more dangerous for some people. Infants and young children, people 72years of age and older, pregnant women, and people with certain health conditions or a weakened immune system are at greatest risk. Each year thousands of people in the [facility] die from flu, and many more are hospitalized. Flu vaccine can:   keep you from getting flu,   make flu less severe if you do get it, and   keep you from spreading flu to your family and other people. 2. Inactivated and recombinant flu vaccines    A dose of flu vaccine is recommended every flu season. Children 6 months through 6years of age may need two doses during the same flu season. Everyone else needs only one dose each flu season.        Some inactivated flu vaccines contain a very small amount of a mercury-based preservative called thimerosal. Studies have not shown thimerosal in vaccines to be harmful, but flu vaccines that do not contain thimerosal are available. There is no live flu virus in flu shots. They cannot cause the flu. There are many flu viruses, and they are always changing. Each year a new flu vaccine is made to protect against three or four viruses that are likely to cause disease in the upcoming flu season. But even when the vaccine doesnt exactly match these viruses, it may still provide some protection    Flu vaccine cannot prevent:   flu that is caused by a virus not covered by the vaccine, or   illnesses that look like flu but are not. It takes about 2 weeks for protection to develop after vaccination, and protection lasts through the flu season. 3. Some people should not get this vaccine    Tell the person who is giving you the vaccine:     If you have any severe, life-threatening allergies. If you ever had a life-threatening allergic reaction after a dose of flu vaccine, or have a severe allergy to any part of this vaccine, you may be advised not to get vaccinated. Most, but not all, types of flu vaccine contain a small amount of egg protein.  If you ever had Guillain-Barré Syndrome (also called GBS). Some people with a history of GBS should not get this vaccine. This should be discussed with your doctor.  If you are not feeling well. It is usually okay to get flu vaccine when you have a mild illness, but you might be asked to come back when you feel better. 4. Risks of a vaccine reaction    With any medicine, including vaccines, there is a chance of reactions. These are usually mild and go away on their own, but serious reactions are also possible. Most people who get a flu shot do not have any problems with it.      Minor problems following a flu shot include:    soreness, redness, or swelling where the shot was given     hoarseness   sore, red or itchy eyes   cough   fever   aches   headache   itching   fatigue  If these problems occur, they usually begin soon after the shot and last 1 or 2 days. More serious problems following a flu shot can include the following:     There may be a small increased risk of Guillain-Barré Syndrome (GBS) after inactivated flu vaccine. This risk has been estimated at 1 or 2 additional cases per million people vaccinated. This is much lower than the risk of severe complications from flu, which can be prevented by flu vaccine.  Young children who get the flu shot along with pneumococcal vaccine (PCV13) and/or DTaP vaccine at the same time might be slightly more likely to have a seizure caused by fever. Ask your doctor for more information. Tell your doctor if a child who is getting flu vaccine has ever had a seizure. Problems that could happen after any injected vaccine:      People sometimes faint after a medical procedure, including vaccination. Sitting or lying down for about 15 minutes can help prevent fainting, and injuries caused by a fall. Tell your doctor if you feel dizzy, or have vision changes or ringing in the ears.  Some people get severe pain in the shoulder and have difficulty moving the arm where a shot was given. This happens very rarely.  Any medication can cause a severe allergic reaction. Such reactions from a vaccine are very rare, estimated at about 1 in a million doses, and would happen within a few minutes to a few hours after the vaccination. As with any medicine, there is a very remote chance of a vaccine causing a serious injury or death. The safety of vaccines is always being monitored. For more information, visit: www.cdc.gov/vaccinesafety/    5. What if there is a serious reaction? What should I look for?  Look for anything that concerns you, such as signs of a severe allergic reaction, very high fever, or unusual behavior.     Signs of a severe allergic reaction can include hives, swelling of the face and throat, difficulty breathing, a fast heartbeat, dizziness, and weakness  usually within a few minutes to a few hours after the vaccination. What should I do?  If you think it is a severe allergic reaction or other emergency that cant wait, call 9-1-1 and get the person to the nearest hospital. Otherwise, call your doctor.  Reactions should be reported to the Vaccine Adverse Event Reporting System (VAERS). Your doctor should file this report, or you can do it yourself through  the VAERS web site at www.vaers. [facility].gov, or by calling 7-434.812.9229. VAERS does not give medical advice. 6. The National Vaccine Injury Compensation Program    The [facility] Vaccine Injury Compensation Program (VICP) is a federal program that was created to compensate people who may have been injured by certain vaccines. Persons who believe they may have been injured by a vaccine can learn about the program and about filing a claim by calling 7-971.283.2888 or visiting the [employer] website at www.[facility].gov/vaccinecompensation. There is a time limit to file a claim for compensation. 7. How can I learn more?  Ask your healthcare provider. He or she can give you the vaccine package insert or suggest other sources of information.  Call your local or state health department.  Contact the Centers for Disease Control and Prevention (CDC):  - Call 6-177.368.5970 (1-800-CDC-INFO) or  - Visit CDCs website at www.cdc.gov/flu    Vaccine Information Statement   Inactivated Influenza Vaccine   8/7/2015  42 EVA Carlos [id number]    Department of Health and Human Services  Centers for Disease Control and Prevention    Office Use Only

## 2017-09-28 NOTE — PROGRESS NOTES
HISTORY OF PRESENT ILLNESS  Alex Sahu is a 81 y.o. female. HPI  Brought in by sister Nayeli Dixon. Pt. Bneoit Sun in for blood pressure and cholesterol check. No complaints of chest pain, shortness of breath, TIAs, claudication or edema. ROS    Physical Exam   Constitutional: She is oriented to person, place, and time. She appears well-developed and well-nourished. Neck: No thyromegaly present. Cardiovascular: Normal rate, regular rhythm and normal heart sounds. No murmur heard. Pulmonary/Chest: Effort normal and breath sounds normal. She has no wheezes. Abdominal: Soft. Bowel sounds are normal. She exhibits no distension. There is no tenderness. There is no rebound and no guarding. Musculoskeletal: Normal range of motion. She exhibits no edema. Lymphadenopathy:     She has no cervical adenopathy. Neurological: She is alert and oriented to person, place, and time. Nursing note and vitals reviewed. ASSESSMENT and PLAN  Orders Placed This Encounter    Influenza virus vaccine (FLUZONE HIGH-DOSE) 65 years and older    METABOLIC PANEL, COMPREHENSIVE    LIPID PANEL    CT IMMUNIZ ADMIN,1 SINGLE/COMB VAC/TOXOID    cloNIDine HCl (CATAPRES) 0.1 mg tablet     Diagnoses and all orders for this visit:    1. Essential hypertension    2. Encounter for immunization  -     Influenza virus vaccine (FLUZONE HIGH-DOSE) 65 years and older  -     CT IMMUNIZ ADMIN,1 SINGLE/COMB VAC/TOXOID  -     METABOLIC PANEL, COMPREHENSIVE  -     LIPID PANEL    3. Hypercholesteremia  -     Influenza virus vaccine (FLUZONE HIGH-DOSE) 65 years and older  -     CT IMMUNIZ ADMIN,1 SINGLE/COMB VAC/TOXOID  -     METABOLIC PANEL, COMPREHENSIVE  -     LIPID PANEL    Other orders  -     cloNIDine HCl (CATAPRES) 0.1 mg tablet; Take 1 Tab by mouth two (2) times a day. For blood pressure      Follow-up Disposition:  Return in about 3 months (around 12/28/2017).

## 2017-09-28 NOTE — MR AVS SNAPSHOT
Visit Information Date & Time Provider Department Dept. Phone Encounter #  
 9/28/2017  9:45 AM Brittney Zamora MD [facility] [phone] [id number] Follow-up Instructions Return in about 3 months (around 12/28/2017). Your Appointments 1/24/2018 10:45 AM  
ESTABLISHED PATIENT with Sarai Cunningham MD  
[facility]) Appt Note: 6 MO. F/U  
 [address]  
[phone] [address] Upcoming Health Maintenance Date Due  
 GLAUCOMA SCREENING Q2Y 8/1/2016 INFLUENZA AGE 9 TO ADULT 8/1/2017 MEDICARE YEARLY EXAM 3/30/2018 DTaP/Tdap/Td series (2 - Td) 6/22/2025 Allergies as of 9/28/2017  Review Complete On: 9/28/2017 By: Brittney Zamora MD  
  
 Severity Noted Reaction Type Reactions Hydrochlorothiazide  03/01/2010    Other (comments)  
 dizziness Current Immunizations  Reviewed on 1/15/2016 Name Date Influenza High Dose Vaccine PF  Incomplete, 9/28/2015, 10/2/2014 Influenza Vaccine 9/14/2016, 9/9/2013 Influenza Vaccine Split 9/24/2012, 9/19/2011, 9/21/2010, 1/1/2009 Pneumococcal Conjugate (PCV-13) 10/26/2016 Tdap 6/22/2015 Varicella Virus Vaccine 11/10/2015 ZZZ-RETIRED (DO NOT USE) Pneumococcal Vaccine (Unspecified Type) 1/1/2009 Not reviewed this visit You Were Diagnosed With   
  
 Codes Comments Essential hypertension    -  Primary ICD-10-CM: I10 
ICD-9-CM: 401.9 Encounter for immunization     ICD-10-CM: P52 ICD-9-CM: V03.89 Hypercholesteremia     ICD-10-CM: E78.00 ICD-9-CM: 272.0 Vitals BP Pulse Temp Resp Height(growth percentile) Weight(growth percentile) 173/69 73 96.8 °F (36 °C) (Oral) 16 5' (1.524 m) 115 lb 3.2 oz (52.3 kg) BMI OB Status Smoking Status 22.5 kg/m2 Hysterectomy Never Smoker Vitals History BMI and BSA Data Body Mass Index Body Surface Area  
 22.5 kg/m 2 1.49 m 2 Preferred Pharmacy Pharmacy Name Phone [id number] [address] [phone] Your Updated Medication List  
  
   
This list is accurate as of: 9/28/17 10:36 AM.  Always use your most recent med list.  
  
  
  
  
 alendronate 70 mg tablet Commonly known as:  FOSAMAX  
take 1 tablet by mouth every week on an empty stomach with 8 oz of water. Remain Upright FOR 30 MINUTES  
  
 atorvastatin 20 mg tablet Commonly known as:  LIPITOR  
take 1 tablet by mouth daily for cholesterol  
  
 cloNIDine HCl 0.1 mg tablet Commonly known as:  CATAPRES Take 1 Tab by mouth two (2) times a day. For blood pressure HI-CINTHIA PLUS VIT D 500 mg(1,250mg) -200 unit per tablet Generic drug:  calcium-vitamin D  
take 1 tablet by mouth twice a day  
  
 indapamide 2.5 mg tablet Commonly known as:  LOZOL  
take 1 tablet by mouth once daily for blood pressure  
  
 levothyroxine 50 mcg tablet Commonly known as:  SYNTHROID  
take 1 tablet by mouth once daily  
  
 losartan 100 mg tablet Commonly known as:  COZAAR Take 1 Tab by mouth daily. For blood pressure  
  
 multivitamins-ca-iron-minerals Tab Commonly known as:  ONE DAILY Take one tablet by mouth once daily. omeprazole 40 mg capsule Commonly known as:  PRILOSEC  
take 1 capsule by mouth once daily for indigestion ONE DAILY ESSENTIAL 0.4 mg Tab Generic drug:  Vit B Comp & C-Vit E-FA-Cee-Zn  
take 1 tablet by mouth daily SORE THROAT (BENZOCAINE-MENTH) 15-2.6 mg Lozg lozenge Generic drug:  benzocaine-menthol  
as needed. verapamil  mg CR tablet Commonly known as:  CALAN-SR  
take 1 tablet by mouth once daily for blood pressure VITAMIN B-12 PO Prescriptions Sent to Pharmacy  Refills  
 cloNIDine HCl (CATAPRES) 0.1 mg tablet 12  
 Sig: Take 1 Tab by mouth two (2) times a day. For blood pressure Class: Normal  
 [facility] [address] [place] [address] Ramon Whipple  #: [phone] Route: Oral  
  
We Performed the Following INFLUENZA VIRUS VACCINE, HIGH DOSE SEASONAL, PRESERVATIVE FREE [01725 CPT(R)] LIPID PANEL [65591 CPT(R)] METABOLIC PANEL, COMPREHENSIVE [18908 CPT(R)] MT IMMUNIZ ADMIN,1 SINGLE/COMB VAC/TOXOID [id number] CPT(R)] Follow-up Instructions Return in about 3 months (around 12/28/2017). Patient Instructions Vaccine Information Statement Influenza (Flu) Vaccine (Inactivated or Recombinant): What you need to know Many Vaccine Information Statements are available in Norwegian and other languages. See www.immunize.org/vis Hojas de Información Sobre Vacunas están disponibles en Español y en muchos otros idiomas. Visite www.immunize.org/vis 1. Why get vaccinated? Influenza (flu) is a contagious disease that spreads around the [facility] every year, usually between October and May. Flu is caused by influenza viruses, and is spread mainly by coughing, sneezing, and close contact. Anyone can get flu. Flu strikes suddenly and can last several days. Symptoms vary by age, but can include: 
 fever/chills  sore throat  muscle aches  fatigue  cough  headache  runny or stuffy nose Flu can also lead to pneumonia and blood infections, and cause diarrhea and seizures in children. If you have a medical condition, such as heart or lung disease, flu can make it worse. Flu is more dangerous for some people. Infants and young children, people 72years of age and older, pregnant women, and people with certain health conditions or a weakened immune system are at greatest risk. Each year thousands of people in the [facility] die from flu, and many more are hospitalized.   
 
Flu vaccine can: 
 keep you from getting flu, 
  make flu less severe if you do get it, and 
 keep you from spreading flu to your family and other people. 2. Inactivated and recombinant flu vaccines A dose of flu vaccine is recommended every flu season. Children 6 months through 6years of age may need two doses during the same flu season. Everyone else needs only one dose each flu season. Some inactivated flu vaccines contain a very small amount of a mercury-based preservative called thimerosal. Studies have not shown thimerosal in vaccines to be harmful, but flu vaccines that do not contain thimerosal are available. There is no live flu virus in flu shots. They cannot cause the flu. There are many flu viruses, and they are always changing. Each year a new flu vaccine is made to protect against three or four viruses that are likely to cause disease in the upcoming flu season. But even when the vaccine doesnt exactly match these viruses, it may still provide some protection Flu vaccine cannot prevent: 
 flu that is caused by a virus not covered by the vaccine, or 
 illnesses that look like flu but are not. It takes about 2 weeks for protection to develop after vaccination, and protection lasts through the flu season. 3. Some people should not get this vaccine Tell the person who is giving you the vaccine:  If you have any severe, life-threatening allergies. If you ever had a life-threatening allergic reaction after a dose of flu vaccine, or have a severe allergy to any part of this vaccine, you may be advised not to get vaccinated. Most, but not all, types of flu vaccine contain a small amount of egg protein.  If you ever had Guillain-Barré Syndrome (also called GBS). Some people with a history of GBS should not get this vaccine. This should be discussed with your doctor.  If you are not feeling well.    
It is usually okay to get flu vaccine when you have a mild illness, but you might be asked to come back when you feel better. 4. Risks of a vaccine reaction With any medicine, including vaccines, there is a chance of reactions. These are usually mild and go away on their own, but serious reactions are also possible. Most people who get a flu shot do not have any problems with it. Minor problems following a flu shot include:  
 soreness, redness, or swelling where the shot was given  hoarseness  sore, red or itchy eyes  cough  fever  aches  headache  itching  fatigue If these problems occur, they usually begin soon after the shot and last 1 or 2 days. More serious problems following a flu shot can include the following:  There may be a small increased risk of Guillain-Barré Syndrome (GBS) after inactivated flu vaccine. This risk has been estimated at 1 or 2 additional cases per million people vaccinated. This is much lower than the risk of severe complications from flu, which can be prevented by flu vaccine.  Young children who get the flu shot along with pneumococcal vaccine (PCV13) and/or DTaP vaccine at the same time might be slightly more likely to have a seizure caused by fever. Ask your doctor for more information. Tell your doctor if a child who is getting flu vaccine has ever had a seizure. Problems that could happen after any injected vaccine:  People sometimes faint after a medical procedure, including vaccination. Sitting or lying down for about 15 minutes can help prevent fainting, and injuries caused by a fall. Tell your doctor if you feel dizzy, or have vision changes or ringing in the ears.  Some people get severe pain in the shoulder and have difficulty moving the arm where a shot was given. This happens very rarely.  Any medication can cause a severe allergic reaction.  Such reactions from a vaccine are very rare, estimated at about 1 in a million doses, and would happen within a few minutes to a few hours after the vaccination. As with any medicine, there is a very remote chance of a vaccine causing a serious injury or death. The safety of vaccines is always being monitored. For more information, visit: www.cdc.gov/vaccinesafety/ 
 
5. What if there is a serious reaction? What should I look for?  Look for anything that concerns you, such as signs of a severe allergic reaction, very high fever, or unusual behavior. Signs of a severe allergic reaction can include hives, swelling of the face and throat, difficulty breathing, a fast heartbeat, dizziness, and weakness  usually within a few minutes to a few hours after the vaccination. What should I do?  If you think it is a severe allergic reaction or other emergency that cant wait, call 9-1-1 and get the person to the nearest hospital. Otherwise, call your doctor.  Reactions should be reported to the Vaccine Adverse Event Reporting System (VAERS). Your doctor should file this report, or you can do it yourself through  the VAERS web site at www.vaers. [facility].gov, or by calling 2-838.915.7725. VAERS does not give medical advice. 6. The National Vaccine Injury Compensation Program 
 
The [facility] Vaccine Injury Compensation Program (VICP) is a federal program that was created to compensate people who may have been injured by certain vaccines. Persons who believe they may have been injured by a vaccine can learn about the program and about filing a claim by calling 8-421.218.8343 or visiting the [address] website at www.[facility].gov/vaccinecompensation. There is a time limit to file a claim for compensation. 7. How can I learn more?  Ask your healthcare provider. He or she can give you the vaccine package insert or suggest other sources of information.  Call your local or state health department.  
 Contact the Centers for Disease Control and Prevention (CDC): 
 - Call 2-881-936-6903 (2-161-DTC-INFO) or 
- Visit CDCs website at www.cdc.gov/flu Vaccine Information Statement Inactivated Influenza Vaccine 8/7/2015 
[address] 731RB-49 Department of Health and [employer] Centers for Disease Control and Prevention Office Use Only Introducing [facility] [place] [facility]! [facility] introduces [employer] patient portal. Now you can access parts of your medical record, email your doctor's office, and request medication refills online. 1. In your internet browser, go to [email or web address] 2. Click on the First Time User? Click Here link in the Sign In box. You will see the New Member Sign Up page. 3. Enter your [employer] Access Code exactly as it appears below. You will not need to use this code after youve completed the sign-up process. If you do not sign up before the expiration date, you must request a new code. · [employer] Access Code: [email or web address] Expires: 10/24/2017 10:38 AM 
 
4. Enter the last four digits of your Social Security Number (xxxx) and Date of Birth (mm/dd/yyyy) as indicated and click Submit. You will be taken to the next sign-up page. 5. Create a [employer] ID. This will be your [employer] login ID and cannot be changed, so think of one that is secure and easy to remember. 6. Create a [employer] password. You can change your password at any time. 7. Enter your Password Reset Question and Answer. This can be used at a later time if you forget your password. 8. Enter your e-mail address. You will receive e-mail notification when new information is available in [address] 9. Click Sign Up. You can now view and download portions of your medical record. 10. Click the Download Summary menu link to download a portable copy of your medical information. If you have questions, please visit the Frequently Asked Questions section of the [employer] website.  Remember, [employer] is NOT to be used for urgent needs. For medical emergencies, dial 911. Now available from your iPhone and Android! Please provide this summary of care documentation to your next provider. Your primary care clinician is listed as Rachel Cable. If you have any questions after today's visit, please call [phone].

## 2017-09-28 NOTE — PROGRESS NOTES
Chief Complaint   Patient presents with    Hypertension    Cholesterol Problem     1. Have you been to the ER, urgent care clinic since your last visit? Hospitalized since your last visit? No    2. Have you seen or consulted any other health care providers outside of the [address] [place] [address] since your last visit? Include any pap smears or colon screening.  No     Health Maintenance Due   Topic Date Due    GLAUCOMA SCREENING Q2Y  08/01/2016    INFLUENZA AGE 9 TO ADULT  08/01/2017       Order placed for High dose flu shot, per Verbal Order from Dr. Kelby Garcia on 9/28/2017

## 2017-09-29 ENCOUNTER — TELEPHONE (OUTPATIENT)
Dept: FAMILY MEDICINE CLINIC | Age: 82
End: 2017-09-29

## 2017-09-29 LAB
ALBUMIN SERPL-MCNC: 4.2 G/DL (ref 3.5–4.7)
ALBUMIN/GLOB SERPL: 1.4 {RATIO} (ref 1.2–2.2)
ALP SERPL-CCNC: 75 IU/L (ref 39–117)
ALT SERPL-CCNC: 16 IU/L (ref 0–32)
AST SERPL-CCNC: 22 IU/L (ref 0–40)
BILIRUB SERPL-MCNC: 0.2 MG/DL (ref 0–1.2)
BUN SERPL-MCNC: 23 MG/DL (ref 8–27)
BUN/CREAT SERPL: 24 (ref 12–28)
CALCIUM SERPL-MCNC: 9.7 MG/DL (ref 8.7–10.3)
CHLORIDE SERPL-SCNC: 102 MMOL/L (ref 96–106)
CHOLEST SERPL-MCNC: 221 MG/DL (ref 100–199)
CO2 SERPL-SCNC: 24 MMOL/L (ref 18–29)
CREAT SERPL-MCNC: 0.96 MG/DL (ref 0.57–1)
GLOBULIN SER CALC-MCNC: 3 G/DL (ref 1.5–4.5)
GLUCOSE SERPL-MCNC: 86 MG/DL (ref 65–99)
HDLC SERPL-MCNC: 128 MG/DL
INTERPRETATION, 910389: NORMAL
INTERPRETATION: NORMAL
LDLC SERPL CALC-MCNC: 79 MG/DL (ref 0–99)
PDF IMAGE, 910387: NORMAL
POTASSIUM SERPL-SCNC: 4.6 MMOL/L (ref 3.5–5.2)
PROT SERPL-MCNC: 7.2 G/DL (ref 6–8.5)
SODIUM SERPL-SCNC: 142 MMOL/L (ref 134–144)
TRIGL SERPL-MCNC: 69 MG/DL (ref 0–149)
VLDLC SERPL CALC-MCNC: 14 MG/DL (ref 5–40)

## 2017-09-29 NOTE — TELEPHONE ENCOUNTER
Message read. Will get order from [address] for rollator and send to [facility].

## 2017-09-29 NOTE — TELEPHONE ENCOUNTER
Pt.would like a call back regarding a roller walker,she said she wants Lexie to call [phone]. [facility] [facility] is the name of the place she will be getting the walker from. Her call back # [phone]

## 2017-10-05 ENCOUNTER — DOCUMENTATION ONLY (OUTPATIENT)
Dept: FAMILY MEDICINE CLINIC | Age: 82
End: 2017-10-05

## 2017-10-05 NOTE — PROGRESS NOTES
Spoke with Reza at [facility]. He will be sending over a form for us to complete for pt to get a rollator.  Will await fax

## 2017-10-08 RX ORDER — LEVOTHYROXINE SODIUM 50 UG/1
TABLET ORAL
Qty: 90 TAB | Refills: 12 | Status: SHIPPED | OUTPATIENT
Start: 2017-10-08 | End: 2018-12-06 | Stop reason: SDUPTHER

## 2017-12-22 DIAGNOSIS — M79.673 PAIN OF FOOT, UNSPECIFIED LATERALITY: Primary | ICD-10-CM

## 2017-12-28 ENCOUNTER — OFFICE VISIT (OUTPATIENT)
Dept: FAMILY MEDICINE CLINIC | Age: 82
End: 2017-12-28

## 2017-12-28 VITALS
HEIGHT: 60 IN | WEIGHT: 116 LBS | SYSTOLIC BLOOD PRESSURE: 146 MMHG | BODY MASS INDEX: 22.78 KG/M2 | TEMPERATURE: 98.8 F | DIASTOLIC BLOOD PRESSURE: 68 MMHG | HEART RATE: 70 BPM | RESPIRATION RATE: 16 BRPM | OXYGEN SATURATION: 100 %

## 2017-12-28 DIAGNOSIS — E78.00 HYPERCHOLESTEREMIA: Primary | ICD-10-CM

## 2017-12-28 DIAGNOSIS — I10 ESSENTIAL HYPERTENSION: ICD-10-CM

## 2017-12-28 DIAGNOSIS — R53.1 WEAKNESS: ICD-10-CM

## 2017-12-28 RX ORDER — ATORVASTATIN CALCIUM 20 MG/1
TABLET, FILM COATED ORAL
Qty: 30 TAB | Refills: 12 | Status: SHIPPED | OUTPATIENT
Start: 2017-12-28 | End: 2019-02-26 | Stop reason: SDUPTHER

## 2017-12-28 RX ORDER — INDAPAMIDE 2.5 MG/1
TABLET, FILM COATED ORAL
Qty: 90 TAB | Refills: 11 | Status: SHIPPED | OUTPATIENT
Start: 2017-12-28 | End: 2019-03-26

## 2017-12-28 NOTE — PROGRESS NOTES
HISTORY OF PRESENT ILLNESS  Alex Christina is a 81 y.o. female. HPI Pt. Comes in for blood pressure check. Some fatigue, sleeps ok. Has been feeling ok. No complaints of chest pain, shortness of breath, TIAs, claudication or edema. Still does some walking in her house. ROS    Physical Exam   Constitutional: She is oriented to person, place, and time. She appears well-developed and well-nourished. Neck: No thyromegaly present. Cardiovascular: Normal rate, regular rhythm and normal heart sounds. No murmur heard. Pulmonary/Chest: Effort normal and breath sounds normal. She has no wheezes. Abdominal: Soft. Bowel sounds are normal. She exhibits no distension. There is no tenderness. There is no rebound and no guarding. Musculoskeletal: Normal range of motion. She exhibits no edema. Lymphadenopathy:     She has no cervical adenopathy. Neurological: She is alert and oriented to person, place, and time. Nursing note and vitals reviewed. ASSESSMENT and PLAN  Orders Placed This Encounter    CBC WITH AUTOMATED DIFF    LIPID PANEL    METABOLIC PANEL, COMPREHENSIVE    TSH 3RD GENERATION    indapamide (LOZOL) 2.5 mg tablet    atorvastatin (LIPITOR) 20 mg tablet     Diagnoses and all orders for this visit:    1. Hypercholesteremia  -     LIPID PANEL    2. Essential hypertension  -     CBC WITH AUTOMATED DIFF  -     METABOLIC PANEL, COMPREHENSIVE    3. Weakness  -     TSH 3RD GENERATION    Other orders  -     indapamide (LOZOL) 2.5 mg tablet; take 1 tablet by mouth once daily for blood pressure  -     atorvastatin (LIPITOR) 20 mg tablet; take 1 tablet by mouth daily for cholesterol      Follow-up Disposition:  Return in about 6 months (around 6/28/2018).

## 2017-12-28 NOTE — MR AVS SNAPSHOT
Visit Information Date & Time Provider Department Dept. Phone Encounter #  
 12/28/2017  9:45 AM Leena Vo MD [facility] [phone] [id number] Follow-up Instructions Return in about 6 months (around 6/28/2018). Your Appointments 1/24/2018 10:45 AM  
ESTABLISHED PATIENT with Rudean Fleischer, MD  
[facility]) Appt Note: 6 MO. F/U  
 [address]  
[phone] [address] Upcoming Health Maintenance Date Due  
 GLAUCOMA SCREENING Q2Y 8/1/2016 MEDICARE YEARLY EXAM 3/30/2018 DTaP/Tdap/Td series (2 - Td) 6/22/2025 Allergies as of 12/28/2017  Review Complete On: 12/28/2017 By: Leena Vo MD  
  
 Severity Noted Reaction Type Reactions Hydrochlorothiazide  03/01/2010    Other (comments)  
 dizziness Current Immunizations  Reviewed on 1/15/2016 Name Date Influenza High Dose Vaccine PF 9/28/2017, 9/28/2015, 10/2/2014 Influenza Vaccine 9/14/2016, 9/9/2013 Influenza Vaccine Split 9/24/2012, 9/19/2011, 9/21/2010, 1/1/2009 Pneumococcal Conjugate (PCV-13) 10/26/2016 Tdap 6/22/2015 Varicella Virus Vaccine 11/10/2015 ZZZ-RETIRED (DO NOT USE) Pneumococcal Vaccine (Unspecified Type) 1/1/2009 Not reviewed this visit You Were Diagnosed With   
  
 Codes Comments Hypercholesteremia    -  Primary ICD-10-CM: E78.00 ICD-9-CM: 272.0 Essential hypertension     ICD-10-CM: I10 
ICD-9-CM: 401.9 Weakness     ICD-10-CM: R53.1 ICD-9-CM: 780.79 Vitals BP Pulse Temp Resp Height(growth percentile) Weight(growth percentile) 146/68 70 98.8 °F (37.1 °C) (Oral) 16 5' (1.524 m) 116 lb (52.6 kg) SpO2 BMI OB Status Smoking Status 100% 22.65 kg/m2 Hysterectomy Never Smoker Vitals History BMI and BSA Data Body Mass Index Body Surface Area  
 22.65 kg/m 2 1.49 m 2 Preferred Pharmacy Pharmacy Name Phone [id number] [address] [facility] [place] [address] Sasha Butlre [phone] Your Updated Medication List  
  
   
This list is accurate as of: 12/28/17 10:22 AM.  Always use your most recent med list.  
  
  
  
  
 alendronate 70 mg tablet Commonly known as:  FOSAMAX  
take 1 tablet by mouth every week on an empty stomach with 8 oz of water. Remain Upright FOR 30 MINUTES  
  
 atorvastatin 20 mg tablet Commonly known as:  LIPITOR  
take 1 tablet by mouth daily for cholesterol  
  
 cloNIDine HCl 0.1 mg tablet Commonly known as:  CATAPRES Take 1 Tab by mouth two (2) times a day. For blood pressure HI-CINTHIA PLUS VIT D 500 mg(1,250mg) -200 unit per tablet Generic drug:  calcium-vitamin D  
take 1 tablet by mouth twice a day  
  
 indapamide 2.5 mg tablet Commonly known as:  LOZOL  
take 1 tablet by mouth once daily for blood pressure  
  
 levothyroxine 50 mcg tablet Commonly known as:  SYNTHROID  
take 1 tablet by mouth once daily  
  
 losartan 100 mg tablet Commonly known as:  COZAAR Take 1 Tab by mouth daily. For blood pressure  
  
 multivitamins-ca-iron-minerals Tab Commonly known as:  ONE DAILY Take one tablet by mouth once daily. omeprazole 40 mg capsule Commonly known as:  PRILOSEC  
take 1 capsule by mouth once daily for indigestion ONE DAILY ESSENTIAL 0.4 mg Tab Generic drug:  Vit B Comp & C-Vit E-FA-Cee-Zn  
take 1 tablet by mouth daily SORE THROAT (BENZOCAINE-MENTH) 15-2.6 mg Lozg lozenge Generic drug:  benzocaine-menthol  
as needed. verapamil  mg CR tablet Commonly known as:  CALAN-SR  
take 1 tablet by mouth once daily for blood pressure VITAMIN B-12 PO Prescriptions Sent to Pharmacy  Refills  
 indapamide (LOZOL) 2.5 mg tablet 11  
 Sig: take 1 tablet by mouth once daily for blood pressure Class: Normal  
 [facility] [address] [place] [address] Ph #: [phone]  
 atorvastatin (LIPITOR) 20 mg tablet 12 Sig: take 1 tablet by mouth daily for cholesterol Class: Normal  
 [id number] [address] [facility] [address] [place] [address] Ph #: [phone] We Performed the Following CBC WITH AUTOMATED DIFF [96959 CPT(R)] LIPID PANEL [26446 CPT(R)] METABOLIC PANEL, COMPREHENSIVE [51022 CPT(R)] TSH 3RD GENERATION [45915 CPT(R)] Follow-up Instructions Return in about 6 months (around 6/28/2018). Introducing [facility]! [facility] introduces [employer] patient portal. Now you can access parts of your medical record, email your doctor's office, and request medication refills online. 1. In your internet browser, go to [email or web address] 2. Click on the First Time User? Click Here link in the Sign In box. You will see the New Member Sign Up page. 3. Enter your [employer] Access Code exactly as it appears below. You will not need to use this code after youve completed the sign-up process. If you do not sign up before the expiration date, you must request a new code. · [employer] Access Code: [email or web address] Expires: 3/28/2018 10:20 AM 
 
4. Enter the last four digits of your Social Security Number (xxxx) and Date of Birth (mm/dd/yyyy) as indicated and click Submit. You will be taken to the next sign-up page. 5. Create a [employer] ID. This will be your [employer] login ID and cannot be changed, so think of one that is secure and easy to remember. 6. Create a [employer] password. You can change your password at any time. 7. Enter your Password Reset Question and Answer. This can be used at a later time if you forget your password. 8. Enter your e-mail address.  You will receive e-mail notification when new information is available in [employer]. 9. Click Sign Up. You can now view and download portions of your medical record. 10. Click the Download Summary menu link to download a portable copy of your medical information. If you have questions, please visit the Frequently Asked Questions section of the [employer] website. Remember, [employer] is NOT to be used for urgent needs. For medical emergencies, dial 911. Now available from your iPhone and Android! Please provide this summary of care documentation to your next provider. Your primary care clinician is listed as Bettina Echavarria. If you have any questions after today's visit, please call [phone].

## 2017-12-28 NOTE — PROGRESS NOTES
Chief Complaint   Patient presents with    Hypertension    Cholesterol Problem     1. Have you been to the ER, urgent care clinic since your last visit? Hospitalized since your last visit? No    2. Have you seen or consulted any other health care providers outside of the [address] [place] [address] since your last visit? Include any pap smears or colon screening. No    Health Maintenance Due   Topic Date Due    GLAUCOMA SCREENING Q2Y  08/01/2016       Pt wants to go every 2 years for eye exam.  Pt goes to Dr. Marlin Mcdonough.

## 2017-12-29 LAB
ALBUMIN SERPL-MCNC: 4.2 G/DL (ref 3.5–4.7)
ALBUMIN/GLOB SERPL: 1.4 {RATIO} (ref 1.2–2.2)
ALP SERPL-CCNC: 66 IU/L (ref 39–117)
ALT SERPL-CCNC: 16 IU/L (ref 0–32)
AST SERPL-CCNC: 21 IU/L (ref 0–40)
BASOPHILS # BLD AUTO: 0 X10E3/UL (ref 0–0.2)
BASOPHILS NFR BLD AUTO: 0 %
BILIRUB SERPL-MCNC: 0.2 MG/DL (ref 0–1.2)
BUN SERPL-MCNC: 31 MG/DL (ref 8–27)
BUN/CREAT SERPL: 29 (ref 12–28)
CALCIUM SERPL-MCNC: 9.7 MG/DL (ref 8.7–10.3)
CHLORIDE SERPL-SCNC: 102 MMOL/L (ref 96–106)
CHOLEST SERPL-MCNC: 231 MG/DL (ref 100–199)
CO2 SERPL-SCNC: 22 MMOL/L (ref 18–29)
CREAT SERPL-MCNC: 1.06 MG/DL (ref 0.57–1)
EOSINOPHIL # BLD AUTO: 0.1 X10E3/UL (ref 0–0.4)
EOSINOPHIL NFR BLD AUTO: 2 %
ERYTHROCYTE [DISTWIDTH] IN BLOOD BY AUTOMATED COUNT: 15.4 % (ref 12.3–15.4)
GLOBULIN SER CALC-MCNC: 3 G/DL (ref 1.5–4.5)
GLUCOSE SERPL-MCNC: 90 MG/DL (ref 65–99)
HCT VFR BLD AUTO: 31.7 % (ref 34–46.6)
HDLC SERPL-MCNC: 116 MG/DL
HGB BLD-MCNC: 10.6 G/DL (ref 11.1–15.9)
IMM GRANULOCYTES # BLD: 0 X10E3/UL (ref 0–0.1)
IMM GRANULOCYTES NFR BLD: 0 %
INTERPRETATION, 910389: NORMAL
INTERPRETATION: NORMAL
LDLC SERPL CALC-MCNC: 95 MG/DL (ref 0–99)
LYMPHOCYTES # BLD AUTO: 2.6 X10E3/UL (ref 0.7–3.1)
LYMPHOCYTES NFR BLD AUTO: 35 %
MCH RBC QN AUTO: 27.5 PG (ref 26.6–33)
MCHC RBC AUTO-ENTMCNC: 33.4 G/DL (ref 31.5–35.7)
MCV RBC AUTO: 82 FL (ref 79–97)
MONOCYTES # BLD AUTO: 0.5 X10E3/UL (ref 0.1–0.9)
MONOCYTES NFR BLD AUTO: 6 %
NEUTROPHILS # BLD AUTO: 4.1 X10E3/UL (ref 1.4–7)
NEUTROPHILS NFR BLD AUTO: 57 %
PDF IMAGE, 910387: NORMAL
PLATELET # BLD AUTO: 243 X10E3/UL (ref 150–379)
POTASSIUM SERPL-SCNC: 4.6 MMOL/L (ref 3.5–5.2)
PROT SERPL-MCNC: 7.2 G/DL (ref 6–8.5)
RBC # BLD AUTO: 3.86 X10E6/UL (ref 3.77–5.28)
SODIUM SERPL-SCNC: 140 MMOL/L (ref 134–144)
TRIGL SERPL-MCNC: 102 MG/DL (ref 0–149)
TSH SERPL DL<=0.005 MIU/L-ACNC: 1.17 UIU/ML (ref 0.45–4.5)
VLDLC SERPL CALC-MCNC: 20 MG/DL (ref 5–40)
WBC # BLD AUTO: 7.3 X10E3/UL (ref 3.4–10.8)

## 2018-01-24 ENCOUNTER — OFFICE VISIT (OUTPATIENT)
Dept: CARDIOLOGY CLINIC | Age: 83
End: 2018-01-24

## 2018-01-24 VITALS
HEART RATE: 73 BPM | SYSTOLIC BLOOD PRESSURE: 158 MMHG | HEIGHT: 60 IN | DIASTOLIC BLOOD PRESSURE: 66 MMHG | BODY MASS INDEX: 22.38 KG/M2 | RESPIRATION RATE: 16 BRPM | WEIGHT: 114 LBS

## 2018-01-24 DIAGNOSIS — E78.00 HYPERCHOLESTEREMIA: ICD-10-CM

## 2018-01-24 DIAGNOSIS — R26.81 GAIT INSTABILITY: ICD-10-CM

## 2018-01-24 DIAGNOSIS — I10 ESSENTIAL HYPERTENSION: Primary | ICD-10-CM

## 2018-01-24 NOTE — PATIENT INSTRUCTIONS
-- 6  Month follow up    Heart-Healthy Diet: Care Instructions  Your Care Instructions    A heart-healthy diet has lots of vegetables, fruits, nuts, beans, and whole grains, and is low in salt. It limits foods that are high in saturated fat, such as meats, cheeses, and fried foods. It may be hard to change your diet, but even small changes can lower your risk of heart attack and heart disease. Follow-up care is a key part of your treatment and safety. Be sure to make and go to all appointments, and call your doctor if you are having problems. It's also a good idea to know your test results and keep a list of the medicines you take. How can you care for yourself at home? Watch your portions  · Learn what a serving is. A \"serving\" and a \"portion\" are not always the same thing. Make sure that you are not eating larger portions than are recommended. For example, a serving of pasta is ½ cup. A serving size of meat is 2 to 3 ounces. A 3-ounce serving is about the size of a deck of cards. Measure serving sizes until you are good at Kemp" them. Keep in mind that restaurants often serve portions that are 2 or 3 times the size of one serving. · To keep your energy level up and keep you from feeling hungry, eat often but in smaller portions. · Eat only the number of calories you need to stay at a healthy weight. If you need to lose weight, eat fewer calories than your body burns (through exercise and other physical activity). Eat more fruits and vegetables  · Eat a variety of fruit and vegetables every day. Dark green, deep orange, red, or yellow fruits and vegetables are especially good for you. Examples include spinach, carrots, peaches, and berries. · Keep carrots, celery, and other veggies handy for snacks. Buy fruit that is in season and store it where you can see it so that you will be tempted to eat it. · Cook dishes that have a lot of veggies in them, such as stir-fries and soups.   Limit saturated and trans fat  · Read food labels, and try to avoid saturated and trans fats. They increase your risk of heart disease. Trans fat is found in many processed foods such as cookies and crackers. · Use olive or canola oil when you cook. Try cholesterol-lowering spreads, such as Benecol or Take Control. · Bake, broil, grill, or steam foods instead of frying them. · Choose lean meats instead of high-fat meats such as hot dogs and sausages. Cut off all visible fat when you prepare meat. · Eat fish, skinless poultry, and meat alternatives such as soy products instead of high-fat meats. Soy products, such as tofu, may be especially good for your heart. · Choose low-fat or fat-free milk and dairy products. Eat fish  · Eat at least two servings of fish a week. Certain fish, such as salmon and tuna, contain omega-3 fatty acids, which may help reduce your risk of heart attack. Eat foods high in fiber  · Eat a variety of grain products every day. Include whole-grain foods that have lots of fiber and nutrients. Examples of whole-grain foods include oats, whole wheat bread, and brown rice. · Buy whole-grain breads and cereals, instead of white bread or pastries. Limit salt and sodium  · Limit how much salt and sodium you eat to help lower your blood pressure. · Taste food before you salt it. Add only a little salt when you think you need it. With time, your taste buds will adjust to less salt. · Eat fewer snack items, fast foods, and other high-salt, processed foods. Check food labels for the amount of sodium in packaged foods. · Choose low-sodium versions of canned goods (such as soups, vegetables, and beans). Limit sugar  · Limit drinks and foods with added sugar. These include candy, desserts, and soda pop. Limit alcohol  · Limit alcohol to no more than 2 drinks a day for men and 1 drink a day for women. Too much alcohol can cause health problems. When should you call for help?   Watch closely for changes in your health, and be sure to contact your doctor if:  ? · You would like help planning heart-healthy meals. Where can you learn more? Go to http://inez-luz elena.info/. Enter V137 in the search box to learn more about \"Heart-Healthy Diet: Care Instructions. \"  Current as of: September 21, 2016  Content Version: 11.4  © 2909-2446 [email or web address]. Care instructions adapted under license by [employer] (which disclaims liability or warranty for this information). If you have questions about a medical condition or this instruction, always ask your healthcare professional. [place] [address] any warranty or liability for your use of this information.

## 2018-01-24 NOTE — PROGRESS NOTES
Chief Complaint   Patient presents with   [facility] Establish Care     pt c/o dizziness. 1. Have you been to the ER, urgent care clinic since your last visit? Hospitalized since your last visit? No    2. Have you seen or consulted any other health care providers outside of the [facility] since your last visit? Include any pap smears or colon screening.  No

## 2018-01-24 NOTE — PROGRESS NOTES
[facility]   [address] [facility], [address] [place] [address] Road                                          NEW PATIENT HPI/FOLLOW-UP      NAME:  Rita Avelar   :   1935   MRN:   [id number]   PCP:  Shannan Martinez MD           Subjective: The patient is a 81y.o. year old female with h/o PE, HTN, high cholesterol, GERD, SBO, hernia, respiratory failure, esophageal mass, palpitations and gait instability (uses walker) who returns for a routine follow-up. Since the last visit, patient reports no new symptoms. Denies change in exercise tolerance, chest pain, edema, medication intolerance, palpitations, shortness of breath, PND/orthopnea wheezing, sputum, syncope, dizziness or light headedness. Doing satisfactorily. Review of Systems  General: Pt denies excessive weight gain or loss. Pt is able to conduct ADL's. Respiratory: Denies shortness of breath, EVANS, wheezing or stridor.   Cardiovascular: Denies precordial pain, palpitations, edema or PND  Gastrointestinal: Denies poor appetite, indigestion, abdominal pain or blood in stool  Peripheral vascular: Denies claudication, leg cramps  Neuropsychiatric: Denies paresthesias,tingling,numbness,anxiety,depression,fatigue  Musculoskeletal: Denies pain,tenderness, soreness,swelling      Past Medical History:   Diagnosis Date    GERD (gastroesophageal reflux disease)     EGD- reflux esophagitis    Hypercholesterolemia     Hypertension     Osteopenia 11/15/2011    Pulmonary embolus (HCC)     hx. of PE after surgery    Thyroid disease      Patient Active Problem List    Diagnosis Date Noted    Palpitations 2016    Gait instability--uses roller walker 2016    SBO (small bowel obstruction) 2013    Esophageal mass 2013    Osteopenia 11/15/2011    Hernia of unspecified site of abdominal cavity without mention of obstruction or gangrene 2011    Respiratory failure ([address] 2010    Hypertension 03/01/2010    Hypercholesteremia 03/01/2010    Pulmonary embolus (HCC) 03/01/2010    GERD (gastroesophageal reflux disease)       Past Surgical History:   Procedure Laterality Date    COLONOSCOPY      armando-internal hemorrhoids    HX GI      bowel obstruction 12-    HX HERNIA REPAIR  5/11/11    HX HYSTERECTOMY      HX ORTHOPAEDIC  2011    toe sx-bilateral    HX SMALL BOWEL RESECTION      4-18-10    IA FREEING BOWEL ADHESION,ENTEROLYSIS      REPAIR ING HERNIA,5+Y/O,REDUCIBL  5/11/11     Allergies   Allergen Reactions    Hydrochlorothiazide Other (comments)     dizziness      History reviewed. No pertinent family history. Social History     Social History    Marital status:      Spouse name: N/A    Number of children: N/A    Years of education: N/A     Occupational History    Not on file. Social History Main Topics    Smoking status: Never Smoker    Smokeless tobacco: Never Used    Alcohol use No    Drug use: No    Sexual activity: Not on file     Other Topics Concern    Not on file     Social History Narrative    Currently lives by herself, brought in by sister. Sister lives 5 minutes away. Current Outpatient Prescriptions   Medication Sig    DOCUSATE SODIUM (COLACE PO) Take  by mouth.  indapamide (LOZOL) 2.5 mg tablet take 1 tablet by mouth once daily for blood pressure    atorvastatin (LIPITOR) 20 mg tablet take 1 tablet by mouth daily for cholesterol    levothyroxine (SYNTHROID) 50 mcg tablet take 1 tablet by mouth once daily    losartan (COZAAR) 100 mg tablet Take 1 Tab by mouth daily. For blood pressure    ONE DAILY ESSENTIAL 0.4 mg tab take 1 tablet by mouth daily    alendronate (FOSAMAX) 70 mg tablet take 1 tablet by mouth every week on an empty stomach with 8 oz of water. Remain Upright FOR 30 MINUTES    omeprazole (PRILOSEC) 40 mg capsule take 1 capsule by mouth once daily for indigestion    verapamil ER (CALAN-SR) 240 mg CR tablet take 1 tablet by mouth once daily for blood pressure    SORE THROAT, BENZOCAINE-MENTH, 15-2.6 mg lozg lozenge as needed.  HI-CINTHIA PLUS VIT D 500 mg(1,250mg) -200 unit per tablet take 1 tablet by mouth twice a day    cloNIDine HCl (CATAPRES) 0.1 mg tablet Take 1 Tab by mouth two (2) times a day. For blood pressure    CYANOCOBALAMIN, VITAMIN B-12, (VITAMIN B-12 PO)     multivitamins-ca-iron-minerals (ONE DAILY) Tab Take one tablet by mouth once daily. No current facility-administered medications for this visit. I have reviewed the nurses notes, vitals, problem list, allergy list, medical history, family medical, social history and medications. Objective:     Physical Exam:     Vitals:    01/24/18 1109 01/24/18 1110 01/24/18 1112   BP: 162/71 166/74 158/66   Pulse: 60 66 73   Resp: 16     Weight: 114 lb (51.7 kg)     Height: 5' (1.524 m)      Body mass index is 22.26 kg/(m^2). General: WDWN thin, elderly woman, in no acute distress. Pleasant affect. HEENT: No carotid bruits, no JVD, trach is midline. Heart:  Normal S1/S2 negative S3 or S4. Regular, no murmur, gallop or rub.   Respiratory: Clear bilaterally, no wheezing or rales  Abdomen:   Soft, non-tender, bowel sounds are active.   Extremities:  No edema, normal cap refill, no cyanosis. Neuro: A&Ox3, speech clear, gait stable. Skin: Skin color is normal. No rashes or lesions. No diaphoresis. Vascular: 2+ pulses symmetric in all extremities        Data Review:       Cardiographics:    EKG interpretation:  Rhythm: Sinus  Rhythm  - occasional ectopic ventricular beat. Rate (approx.): 64; Axis: normal; P wave: normal; QRS interval: normal ; ST/T wave: normal. This EKG was interpreted by Bryce Mcfarland PA-C.         Cardiology Labs:    Results for orders placed or performed during the hospital encounter of 03/31/13   EKG, 12 LEAD, INITIAL   Result Value Ref Range    Ventricular Rate 78 BPM    Atrial Rate 78 BPM    P-R Interval 114 ms QRS Duration 92 ms    Q-T Interval 374 ms    QTC Calculation (Bezet) 426 ms    Calculated R Axis 62 degrees    Calculated T Axis 34 degrees    Diagnosis       Normal sinus rhythm  When compared with ECG of 19-APR-2010 10:27,  Sinus rhythm has replaced Junctional rhythm  Confirmed by Deena Bhandari ([id number]) on 4/1/2013 1:05:06 PM   Results for orders placed or performed in visit on 03/22/12   AMB POC EKG ROUTINE W/ 12 LEADS, INTER & REP    Impression    Pvcs, nsr       Lab Results   Component Value Date/Time    Cholesterol, total 231 12/28/2017 10:32 AM    HDL Cholesterol 116 12/28/2017 10:32 AM    LDL, calculated 95 12/28/2017 10:32 AM    Triglyceride 102 12/28/2017 10:32 AM    CHOL/HDL Ratio 2.5 01/30/2009 03:11 PM       Lab Results   Component Value Date/Time    Sodium 140 12/28/2017 10:32 AM    Potassium 4.6 12/28/2017 10:32 AM    Chloride 102 12/28/2017 10:32 AM    CO2 22 12/28/2017 10:32 AM    Anion gap 11 04/10/2013 05:30 AM    Glucose 90 12/28/2017 10:32 AM    BUN 31 12/28/2017 10:32 AM    Creatinine 1.06 12/28/2017 10:32 AM    BUN/Creatinine ratio 29 12/28/2017 10:32 AM    GFR est AA 57 12/28/2017 10:32 AM    GFR est non-AA 49 12/28/2017 10:32 AM    Calcium 9.7 12/28/2017 10:32 AM    Bilirubin, total 0.2 12/28/2017 10:32 AM    AST (SGOT) 21 12/28/2017 10:32 AM    Alk. phosphatase 66 12/28/2017 10:32 AM    Protein, total 7.2 12/28/2017 10:32 AM    Albumin 4.2 12/28/2017 10:32 AM    Globulin 4.0 04/01/2013 02:35 AM    A-G Ratio 1.4 12/28/2017 10:32 AM    ALT (SGPT) 16 12/28/2017 10:32 AM          Assessment:       ICD-10-CM ICD-9-CM    1. Essential hypertension I10 401.9 AMB POC EKG ROUTINE W/ 12 LEADS, INTER & REP   2. Hypercholesteremia E78.00 272.0 AMB POC EKG ROUTINE W/ 12 LEADS, INTER & REP   3. Gait instability--uses roller walker R26.81 781.2 AMB POC EKG ROUTINE W/ 12 LEADS, INTER & REP         Discussion: Patient presents at this time stable from a cardiac perspective.  BP was adequately controlled for age. Pleased with present status. Discussed/seen with Dr. Soy De León: 1. Continue same meds. 2. Lipid profile and labs followed by PCP. 3.Encouraged to exercise to tolerance,  and follow low fat, low cholesterol, low sodium predominantly Plant-based (consider Mediterranean) diet. 4.Follow up: 6 months   --  Call with questions or concerns. -- Will follow up any test results by phone and/or f/u here in office if needed. .        I have discussed the diagnosis with the patient and the intended plan as seen in the above orders. The patient has received an after-visit summary and questions were answered concerning future plans. I have discussed any concerning medication side effects and warnings with the patient as well. Patient seen and examined. All pertinent data reviewed. I have reviewed detailed note as outlined by Liz Woodard. Case discussed with Nursing/medical assistant staff and Liz Woodard. Patient cardiac stable. BP controlled. Plans as outlined.       Mariajose Merchant PA-C  1/24/2018     GIOVANY Barron

## 2018-01-24 NOTE — MR AVS SNAPSHOT
[facility] 
 
 
 [address] 
[phone] Patient: Rita Avelar MRN:  [id number] Visit Information Date & Time Provider Department Dept. Phone Encounter #  
 1/24/2018 10:45 AM Marquez Donis MD [facility] [place] [id number] Your Appointments 6/28/2018 10:30 AM  
ROUTINE CARE with Shannan Martinez MD  
[facility]) Appt Note: routine follow up  
 [address] [place] [id number] [phone] [address] [facility] [address] Upcoming Health Maintenance Date Due  
 GLAUCOMA SCREENING Q2Y 8/1/2016 MEDICARE YEARLY EXAM 3/30/2018 DTaP/Tdap/Td series (2 - Td) 6/22/2025 Allergies as of 1/24/2018  Review Complete On: 1/24/2018 By: Rahel Sanchez LPN Severity Noted Reaction Type Reactions Hydrochlorothiazide  03/01/2010    Other (comments)  
 dizziness Current Immunizations  Reviewed on 1/15/2016 Name Date Influenza High Dose Vaccine PF 9/28/2017, 9/28/2015, 10/2/2014 Influenza Vaccine 9/14/2016, 9/9/2013 Influenza Vaccine Split 9/24/2012, 9/19/2011, 9/21/2010, 1/1/2009 Pneumococcal Conjugate (PCV-13) 10/26/2016 Tdap 6/22/2015 Varicella Virus Vaccine 11/10/2015 ZZZ-RETIRED (DO NOT USE) Pneumococcal Vaccine (Unspecified Type) 1/1/2009 Not reviewed this visit You Were Diagnosed With   
  
 Codes Comments Hypercholesteremia    -  Primary ICD-10-CM: E78.00 ICD-9-CM: 272.0 Essential hypertension     ICD-10-CM: I10 
ICD-9-CM: 401.9 Gait instability     ICD-10-CM: R26.81 
ICD-9-CM: 182. 2 Vitals BP Pulse Resp Height(growth percentile) Weight(growth percentile) BMI  
 158/66 (BP 1 Location: Left arm, BP Patient Position: Standing) 73 16 5' (1.524 m) 114 lb (51.7 kg) 22.26 kg/m2 OB Status Smoking Status Hysterectomy Never Smoker Vitals History BMI and BSA Data Body Mass Index Body Surface Area  
 22.26 kg/m 2 1.48 m 2 Preferred Pharmacy Pharmacy Name Phone [id number] [place] [address] [place] [phone] Your Updated Medication List  
  
   
This list is accurate as of: 1/24/18 11:49 AM.  Always use your most recent med list.  
  
  
  
  
 alendronate 70 mg tablet Commonly known as:  FOSAMAX  
take 1 tablet by mouth every week on an empty stomach with 8 oz of water. Remain Upright FOR 30 MINUTES  
  
 atorvastatin 20 mg tablet Commonly known as:  LIPITOR  
take 1 tablet by mouth daily for cholesterol  
  
 cloNIDine HCl 0.1 mg tablet Commonly known as:  CATAPRES Take 1 Tab by mouth two (2) times a day. For blood pressure COLACE PO Take  by mouth. HI-CINTHIA PLUS VIT D 500 mg(1,250mg) -200 unit per tablet Generic drug:  calcium-vitamin D  
take 1 tablet by mouth twice a day  
  
 indapamide 2.5 mg tablet Commonly known as:  LOZOL  
take 1 tablet by mouth once daily for blood pressure  
  
 levothyroxine 50 mcg tablet Commonly known as:  SYNTHROID  
take 1 tablet by mouth once daily  
  
 losartan 100 mg tablet Commonly known as:  COZAAR Take 1 Tab by mouth daily. For blood pressure  
  
 multivitamins-ca-iron-minerals Tab Commonly known as:  ONE DAILY Take one tablet by mouth once daily. omeprazole 40 mg capsule Commonly known as:  PRILOSEC  
take 1 capsule by mouth once daily for indigestion ONE DAILY ESSENTIAL 0.4 mg Tab Generic drug:  Vit B Comp & C-Vit E-FA-Cee-Zn  
take 1 tablet by mouth daily SORE THROAT (BENZOCAINE-MENTH) 15-2.6 mg Lozg lozenge Generic drug:  benzocaine-menthol  
as needed. verapamil  mg CR tablet Commonly known as:  CALAN-SR  
take 1 tablet by mouth once daily for blood pressure  VITAMIN B-12 PO  
  
  
 We Performed the Following AMB POC EKG ROUTINE W/ 12 LEADS, INTER & REP [48259 CPT(R)] Patient Instructions -- 6  Month follow up Heart-Healthy Diet: Care Instructions Your Care Instructions A heart-healthy diet has lots of vegetables, fruits, nuts, beans, and whole grains, and is low in salt. It limits foods that are high in saturated fat, such as meats, cheeses, and fried foods. It may be hard to change your diet, but even small changes can lower your risk of heart attack and heart disease. Follow-up care is a key part of your treatment and safety. Be sure to make and go to all appointments, and call your doctor if you are having problems. It's also a good idea to know your test results and keep a list of the medicines you take. How can you care for yourself at home? Watch your portions · Learn what a serving is. A \"serving\" and a \"portion\" are not always the same thing. Make sure that you are not eating larger portions than are recommended. For example, a serving of pasta is ½ cup. A serving size of meat is 2 to 3 ounces. A 3-ounce serving is about the size of a deck of cards. Measure serving sizes until you are good at Lily" them. Keep in mind that restaurants often serve portions that are 2 or 3 times the size of one serving. · To keep your energy level up and keep you from feeling hungry, eat often but in smaller portions. · Eat only the number of calories you need to stay at a healthy weight. If you need to lose weight, eat fewer calories than your body burns (through exercise and other physical activity). Eat more fruits and vegetables · Eat a variety of fruit and vegetables every day. Dark green, deep orange, red, or yellow fruits and vegetables are especially good for you. Examples include spinach, carrots, peaches, and berries. · Keep carrots, celery, and other veggies handy for snacks.  Buy fruit that is in season and store it where you can see it so that you will be tempted to eat it. · Cook dishes that have a lot of veggies in them, such as stir-fries and soups. Limit saturated and trans fat · Read food labels, and try to avoid saturated and trans fats. They increase your risk of heart disease. Trans fat is found in many processed foods such as cookies and crackers. · Use olive or canola oil when you cook. Try cholesterol-lowering spreads, such as Benecol or Take Control. · Bake, broil, grill, or steam foods instead of frying them. · Choose lean meats instead of high-fat meats such as hot dogs and sausages. Cut off all visible fat when you prepare meat. · Eat fish, skinless poultry, and meat alternatives such as soy products instead of high-fat meats. Soy products, such as tofu, may be especially good for your heart. · Choose low-fat or fat-free milk and dairy products. Eat fish · Eat at least two servings of fish a week. Certain fish, such as salmon and tuna, contain omega-3 fatty acids, which may help reduce your risk of heart attack. Eat foods high in fiber · Eat a variety of grain products every day. Include whole-grain foods that have lots of fiber and nutrients. Examples of whole-grain foods include oats, whole wheat bread, and brown rice. · Buy whole-grain breads and cereals, instead of white bread or pastries. Limit salt and sodium · Limit how much salt and sodium you eat to help lower your blood pressure. · Taste food before you salt it. Add only a little salt when you think you need it. With time, your taste buds will adjust to less salt. · Eat fewer snack items, fast foods, and other high-salt, processed foods. Check food labels for the amount of sodium in packaged foods. · Choose low-sodium versions of canned goods (such as soups, vegetables, and beans). Limit sugar · Limit drinks and foods with added sugar. These include candy, desserts, and soda pop. Limit alcohol · Limit alcohol to no more than 2 drinks a day for men and 1 drink a day for women. Too much alcohol can cause health problems. When should you call for help? Watch closely for changes in your health, and be sure to contact your doctor if: 
? · You would like help planning heart-healthy meals. Where can you learn more? Go to http://inez-luz elena.info/. Enter V137 in the search box to learn more about \"Heart-Healthy Diet: Care Instructions. \" Current as of: September 21, 2016 Content Version: 11.4 © 7218-7879 [employer]. Care instructions adapted under license by [employer] (which disclaims liability or warranty for this information). If you have questions about a medical condition or this instruction, always ask your healthcare professional. [address] any warranty or liability for your use of this information. Introducing [facility]! [address] [facility] introduces [employer] patient portal. Now you can access parts of your medical record, email your doctor's office, and request medication refills online. 1. In your internet browser, go to [email or web address] 2. Click on the First Time User? Click Here link in the Sign In box. You will see the New Member Sign Up page. 3. Enter your [employer] Access Code exactly as it appears below. You will not need to use this code after youve completed the sign-up process. If you do not sign up before the expiration date, you must request a new code. · [employer] Access Code: [email or web address] Expires: 3/28/2018 10:20 AM 
 
4. Enter the last four digits of your Social Security Number (xxxx) and Date of Birth (mm/dd/yyyy) as indicated and click Submit. You will be taken to the next sign-up page. 5. Create a [employer] ID. This will be your [employer] login ID and cannot be changed, so think of one that is secure and easy to remember. 6. Create a Mtime password. You can change your password at any time. 7. Enter your Password Reset Question and Answer. This can be used at a later time if you forget your password. 8. Enter your e-mail address. You will receive e-mail notification when new information is available in [address] 9. Click Sign Up. You can now view and download portions of your medical record. 10. Click the Download Summary menu link to download a portable copy of your medical information. If you have questions, please visit the Frequently Asked Questions section of the M[employer] website. Remember, [employer] is NOT to be used for urgent needs. For medical emergencies, dial 911. Now available from your iPhone and Android! Please provide this summary of care documentation to your next provider. Your primary care clinician is listed as Demi Shelton. If you have any questions after today's visit, please call [phone].

## 2018-05-08 RX ORDER — ALENDRONATE SODIUM 70 MG/1
TABLET ORAL
Qty: 4 TAB | Refills: 13 | Status: SHIPPED | OUTPATIENT
Start: 2018-05-08 | End: 2019-03-26 | Stop reason: SDUPTHER

## 2018-05-21 RX ORDER — VERAPAMIL HYDROCHLORIDE 240 MG/1
TABLET, FILM COATED, EXTENDED RELEASE ORAL
Qty: 90 TAB | Refills: 11 | Status: SHIPPED | OUTPATIENT
Start: 2018-05-21 | End: 2019-08-07 | Stop reason: SDUPTHER

## 2018-06-18 RX ORDER — DOCUSATE SODIUM 50 MG
CAPSULE ORAL
Qty: 90 TAB | Status: SHIPPED | OUTPATIENT
Start: 2018-06-18 | End: 2019-09-26 | Stop reason: ALTCHOICE

## 2018-07-11 RX ORDER — OMEPRAZOLE 40 MG/1
CAPSULE, DELAYED RELEASE ORAL
Qty: 90 CAP | Refills: 12 | Status: SHIPPED | OUTPATIENT
Start: 2018-07-11 | End: 2019-09-12 | Stop reason: SDUPTHER

## 2018-07-25 ENCOUNTER — OFFICE VISIT (OUTPATIENT)
Dept: FAMILY MEDICINE CLINIC | Age: 83
End: 2018-07-25

## 2018-07-25 ENCOUNTER — OFFICE VISIT (OUTPATIENT)
Dept: CARDIOLOGY CLINIC | Age: 83
End: 2018-07-25

## 2018-07-25 VITALS
HEART RATE: 73 BPM | WEIGHT: 113 LBS | HEIGHT: 60 IN | SYSTOLIC BLOOD PRESSURE: 170 MMHG | OXYGEN SATURATION: 99 % | DIASTOLIC BLOOD PRESSURE: 73 MMHG | BODY MASS INDEX: 22.19 KG/M2 | RESPIRATION RATE: 16 BRPM

## 2018-07-25 VITALS
RESPIRATION RATE: 16 BRPM | DIASTOLIC BLOOD PRESSURE: 63 MMHG | HEIGHT: 60 IN | BODY MASS INDEX: 22.42 KG/M2 | SYSTOLIC BLOOD PRESSURE: 149 MMHG | HEART RATE: 68 BPM | WEIGHT: 114.2 LBS | TEMPERATURE: 97.8 F | OXYGEN SATURATION: 100 %

## 2018-07-25 DIAGNOSIS — I10 HYPERTENSION, UNSPECIFIED TYPE: ICD-10-CM

## 2018-07-25 DIAGNOSIS — R26.81 GAIT INSTABILITY: ICD-10-CM

## 2018-07-25 DIAGNOSIS — K21.9 GASTROESOPHAGEAL REFLUX DISEASE WITHOUT ESOPHAGITIS: ICD-10-CM

## 2018-07-25 DIAGNOSIS — R42 DIZZINESS: Primary | ICD-10-CM

## 2018-07-25 DIAGNOSIS — R42 VERTIGO: Primary | ICD-10-CM

## 2018-07-25 DIAGNOSIS — E78.00 HYPERCHOLESTEREMIA: ICD-10-CM

## 2018-07-25 DIAGNOSIS — I10 ESSENTIAL HYPERTENSION: ICD-10-CM

## 2018-07-25 RX ORDER — LOSARTAN POTASSIUM 100 MG/1
100 TABLET ORAL DAILY
Qty: 90 TAB | Refills: 3 | Status: SHIPPED | OUTPATIENT
Start: 2018-07-25 | End: 2018-08-21 | Stop reason: ALTCHOICE

## 2018-07-25 RX ORDER — MECLIZINE HYDROCHLORIDE 25 MG/1
TABLET ORAL
Qty: 15 TAB | Refills: 1 | Status: SHIPPED | OUTPATIENT
Start: 2018-07-25 | End: 2021-09-29

## 2018-07-25 NOTE — MR AVS SNAPSHOT
[address] [facility] 
 
 
 [address] [place] 
[phone] Patient: Haritha Taylor MRN:  [id number] Visit Information Date & Time Provider Department Dept. Phone Encounter #  
 7/25/2018 10:15 AM Heather Conti MD [facility] [place] [address] [id number] Your Appointments 7/25/2018  3:30 PM  
Any with Alex Prajapati MD  
[facility] Appt Note: Dizziness [address] [phone]  
[phone] [address] [phone]  
  
    
 8/21/2018 10:45 AM  
ROUTINE CARE with Alex Prajapati MD  
[facility] Appt Note: 6-mth f/up med ck-up [facility]07/23/18; r/s 6-mth f/up med ck-up [facility]07/23/18  
 [address] [facility] [address] [phone]  
[phone] [address] [phone]  
  
    
 1/29/2019 10:15 AM  
ESTABLISHED PATIENT with Heather Conti MD  
[facility]) Appt Note: SIX MONTH FOLLOW UP-  [place] [id number] [address] [place]  
[phone] [address] Upcoming Health Maintenance Date Due  
 GLAUCOMA SCREENING Q2Y 8/1/2016 Influenza Age 5 to Adult 8/1/2018 DTaP/Tdap/Td series (2 - Td) 6/22/2025 Allergies as of 7/25/2018  Review Complete On: 7/25/2018 By: Heather Conti MD  
  
 Severity Noted Reaction Type Reactions Hydrochlorothiazide  03/01/2010    Other (comments)  
 dizziness Current Immunizations  Reviewed on 1/15/2016 Name Date Influenza High Dose Vaccine PF 9/28/2017, 9/28/2015, 10/2/2014 Influenza Vaccine 9/14/2016, 9/9/2013 Influenza Vaccine Split 9/24/2012, 9/19/2011, 9/21/2010, 1/1/2009 Pneumococcal Conjugate (PCV-13) 10/26/2016 Tdap 6/22/2015 Varicella Virus Vaccine 11/10/2015 ZZZ-RETIRED (DO NOT USE) Pneumococcal Vaccine (Unspecified Type) 1/1/2009 Not reviewed this visit You Were Diagnosed With   
  
 Codes Comments Vertigo    -  Primary ICD-10-CM: O95 ICD-9-CM: 780.4 Hypertension, unspecified type     ICD-10-CM: I10 
ICD-9-CM: 401.9 Hypercholesteremia     ICD-10-CM: E78.00 ICD-9-CM: 272.0 Gastroesophageal reflux disease without esophagitis     ICD-10-CM: K21.9 ICD-9-CM: 530.81 Gait instability     ICD-10-CM: R26.81 
ICD-9-CM: 573. 2 Vitals BP Pulse Resp Height(growth percentile) Weight(growth percentile) SpO2  
 170/73 (BP 1 Location: Left arm, BP Patient Position: Standing) 73 16 5' (1.524 m) 113 lb (51.3 kg) 99% BMI OB Status Smoking Status 22.07 kg/m2 Hysterectomy Never Smoker Vitals History BMI and BSA Data Body Mass Index Body Surface Area 22.07 kg/m 2 1.47 m 2 Preferred Pharmacy Pharmacy Name Phone [id number] [address] [phone] Your Updated Medication List  
  
   
This list is accurate as of 7/25/18 11:17 AM.  Always use your most recent med list.  
  
  
  
  
 alendronate 70 mg tablet Commonly known as:  FOSAMAX  
take 1 tablet by mouth every week on an empty stomach with 8  
  
 atorvastatin 20 mg tablet Commonly known as:  LIPITOR  
take 1 tablet by mouth daily for cholesterol  
  
 cloNIDine HCl 0.1 mg tablet Commonly known as:  CATAPRES Take 1 Tab by mouth two (2) times a day. For blood pressure COLACE PO Take  by mouth daily. HI-CINTHIA PLUS VIT D 500 mg(1,250mg) -200 unit per tablet Generic drug:  calcium-vitamin D  
take 1 tablet by mouth twice a day  
  
 indapamide 2.5 mg tablet Commonly known as:  LOZOL  
take 1 tablet by mouth once daily for blood pressure  
  
 levothyroxine 50 mcg tablet Commonly known as:  SYNTHROID  
take 1 tablet by mouth once daily  
  
 losartan 100 mg tablet Commonly known as:  COZAAR Take 1 Tab by mouth daily. For blood pressure  
  
 multivitamins-ca-iron-minerals Tab Commonly known as:  ONE DAILY Take one tablet by mouth once daily. omeprazole 40 mg capsule Commonly known as:  PRILOSEC  
take 1 capsule by mouth once daily FOR INDIGESTION  
  
 ONE DAILY ESSENTIAL 0.4 mg Tab Generic drug:  Vit B Comp & C-Vit E-FA-Cee-Zn  
take 1 tablet by mouth once daily SORE THROAT (BENZOCAINE-MENTH) 15-2.6 mg Lozg lozenge Generic drug:  benzocaine-menthol  
as needed. verapamil  mg CR tablet Commonly known as:  CALAN-SR  
take 1 tablet by mouth once daily for blood pressure VITAMIN B-12 PO We Performed the Following AMB POC EKG ROUTINE W/ 12 LEADS, INTER & REP [25517 CPT(R)] Introducing [facility]! [employer] introduces [employer] patient portal. Now you can access parts of your medical record, email your doctor's office, and request medication refills online. 1. In your internet browser, go to [email or web address] 2. Click on the First Time User? Click Here link in the Sign In box. You will see the New Member Sign Up page. 3. Enter your [employer] Access Code exactly as it appears below. You will not need to use this code after youve completed the sign-up process. If you do not sign up before the expiration date, you must request a new code. · [employer] Access Code: [email or web address] Expires: 10/23/2018 11:17 AM 
 
4. Enter the last four digits of your Social Security Number (xxxx) and Date of Birth (mm/dd/yyyy) as indicated and click Submit. You will be taken to the next sign-up page. 5. Create a [employer] ID. This will be your [employer] login ID and cannot be changed, so think of one that is secure and easy to remember. 6. Create a [employer] password. You can change your password at any time. 7. Enter your Password Reset Question and Answer. This can be used at a later time if you forget your password. 8. Enter your e-mail address. You will receive e-mail notification when new information is available in [address] 9. Click Sign Up. You can now view and download portions of your medical record. 10. Click the Download Summary menu link to download a portable copy of your medical information. If you have questions, please visit the Frequently Asked Questions section of the [employer] website. Remember, [employer] is NOT to be used for urgent needs. For medical emergencies, dial 911. Now available from your iPhone and Android! Please provide this summary of care documentation to your next provider. Your primary care clinician is listed as Alexia Issa. If you have any questions after today's visit, please call [phone].

## 2018-07-25 NOTE — PROGRESS NOTES
[facility]   [address] [facility], [address]                                          NEW PATIENT HPI/FOLLOW-UP      NAME:  [address]   :   1935   MRN:   [id number]   PCP:  Aide Jessica MD           Subjective: The patient is a 81y.o. year old female  who returns for a routine follow-up and with recent hx of profound dizziness with positional changes of head. Few nights ago awoke form sleep with severe vertigo with room spinning, worse with movement of head. Better now. Denies change in exercise tolerance, chest pain, edema, medication intolerance, palpitations, shortness of breath, PND/orthopnea wheezing, sputum, syncope. Has appt to see PCP today. BP's per sister in 150-160/60-80 range and HR's in high 50's and low 60's. Review of Systems  General: Pt denies excessive weight gain or loss. Pt is able to conduct ADL's. Respiratory: Denies shortness of breath, EVANS, wheezing or stridor.    Cardiovascular: Denies precordial pain, palpitations, edema or PND;+dizziness,vertigo  Gastrointestinal: Denies poor appetite, indigestion, abdominal pain or blood in stool  Peripheral vascular: Denies claudication, leg cramps  Neuropsychiatric: Denies paresthesias,tingling,numbness,anxiety,depression,fatigue  Musculoskeletal: Denies pain,tenderness, soreness,swelling      Past Medical History:   Diagnosis Date    GERD (gastroesophageal reflux disease)     EGD- reflux esophagitis    Hypercholesterolemia     Hypertension     Osteopenia 11/15/2011    Pulmonary embolus (HCC)     hx. of PE after surgery    Thyroid disease      Patient Active Problem List    Diagnosis Date Noted    Palpitations 2016    Gait instability--uses roller walker 2016    SBO (small bowel obstruction) ([facility] [address] 2013    Esophageal mass 2013    Osteopenia 11/15/2011    Hernia of unspecified site of abdominal cavity without mention of obstruction or gangrene 05/12/2011    Respiratory failure ([facility] [address] 04/20/2010    Hypertension 03/01/2010    Hypercholesteremia 03/01/2010    [facility] 03/01/2010    GERD (gastroesophageal reflux disease)       Past Surgical History:   Procedure Laterality Date    COLONOSCOPY      armando-internal hemorrhoids    HX GI      bowel obstruction 12-    HX HERNIA REPAIR  5/11/11    HX HYSTERECTOMY      HX ORTHOPAEDIC  2011    toe sx-bilateral    HX SMALL BOWEL RESECTION      4-18-10    [place] FREEING BOWEL ADHESION,ENTEROLYSIS      REPAIR ING HERNIA,5+Y/O,REDUCIBL  5/11/11     Allergies   Allergen Reactions    Hydrochlorothiazide Other (comments)     dizziness      History reviewed. No pertinent family history. Social History     Social History    Marital status:      Spouse name: N/A    Number of children: N/A    Years of education: N/A     Occupational History    Not on file. Social History Main Topics    Smoking status: Never Smoker    Smokeless tobacco: Never Used    Alcohol use No    Drug use: No    Sexual activity: Not on file     Other Topics Concern    Not on file     Social History Narrative    Currently lives by herself, brought in by sister. Sister lives 5 minutes away. Current Outpatient Prescriptions   Medication Sig    omeprazole (PRILOSEC) 40 mg capsule take 1 capsule by mouth once daily FOR INDIGESTION    verapamil ER (CALAN-SR) 240 mg CR tablet take 1 tablet by mouth once daily for blood pressure    alendronate (FOSAMAX) 70 mg tablet take 1 tablet by mouth every week on an empty stomach with 8    DOCUSATE SODIUM (COLACE PO) Take  by mouth daily.     indapamide (LOZOL) 2.5 mg tablet take 1 tablet by mouth once daily for blood pressure    atorvastatin (LIPITOR) 20 mg tablet take 1 tablet by mouth daily for cholesterol    levothyroxine (SYNTHROID) 50 mcg tablet take 1 tablet by mouth once daily    cloNIDine HCl (CATAPRES) 0.1 mg tablet Take 1 Tab by mouth two (2) times a day. For blood pressure (Patient taking differently: Take 0.1 mg by mouth two (2) times a day. For blood pressure  Indications: PT states taking once a day)    losartan (COZAAR) 100 mg tablet Take 1 Tab by mouth daily. For blood pressure    SORE THROAT, BENZOCAINE-MENTH, 15-2.6 mg lozg lozenge as needed.  HI-CINTHIA PLUS VIT D 500 mg(1,250mg) -200 unit per tablet take 1 tablet by mouth twice a day    multivitamins-ca-iron-minerals (ONE DAILY) Tab Take one tablet by mouth once daily.  ONE DAILY ESSENTIAL 0.4 mg tab take 1 tablet by mouth once daily    CYANOCOBALAMIN, VITAMIN B-12, (VITAMIN B-12 PO)      No current facility-administered medications for this visit. I have reviewed the nurses notes, vitals, problem list, allergy list, medical history, family medical, social history and medications. Objective:     Physical Exam:     Vitals:    07/25/18 1022 07/25/18 1047 07/25/18 1050   BP: 158/75 157/75 170/73   Pulse: 60  73   Resp: 16     SpO2: 99%     Weight: 113 lb (51.3 kg)     Height: 5' (1.524 m)      Body mass index is 22.07 kg/(m^2). General: Well developed, in no acute distress,  Seated on roller  HEENT: No carotid bruits, no JVD, trach is midline. Heart:  Normal S1/S2 negative S3 or S4. Regular, no murmur, gallop or rub.   Respiratory: Clear bilaterally, no wheezing or rales  Abdomen:   Soft, non-tender, bowel sounds are active.   Extremities:  No edema, normal cap refill, no cyanosis. Neuro: A&Ox3, speech clear, gait stable. Skin: Skin color is normal. No rashes or lesions. No diaphoresis.   Vascular: 2+ pulses symmetric in all extremities        Data Review:       Cardiographics:    EKG: Borderline SB,LAE    Cardiology Labs:    Results for orders placed or performed during the hospital encounter of 03/31/13   EKG, 12 LEAD, INITIAL   Result Value Ref Range    Ventricular Rate 78 BPM    Atrial Rate 78 BPM    P-R Interval 114 ms    QRS Duration 92 ms    Q-T Interval 374 ms QTC Calculation (Bezet) 426 ms    Calculated R Axis 62 degrees    Calculated T Axis 34 degrees    Diagnosis       Normal sinus rhythm  When compared with ECG of 19-APR-2010 10:27,  Sinus rhythm has replaced Junctional rhythm  Confirmed by Saida Benedict ([id number]) on 4/1/2013 1:05:06 PM   Results for orders placed or performed in visit on 03/22/12   AMB POC EKG ROUTINE W/ 12 LEADS, INTER & REP    Impression    Pvcs, nsr       Lab Results   Component Value Date/Time    Cholesterol, total 231 (H) 12/28/2017 10:32 AM    HDL Cholesterol 116 12/28/2017 10:32 AM    LDL, calculated 95 12/28/2017 10:32 AM    Triglyceride 102 12/28/2017 10:32 AM    CHOL/HDL Ratio 2.5 01/30/2009 03:11 PM       Lab Results   Component Value Date/Time    Sodium 140 12/28/2017 10:32 AM    Potassium 4.6 12/28/2017 10:32 AM    Chloride 102 12/28/2017 10:32 AM    CO2 22 12/28/2017 10:32 AM    Anion gap 11 04/10/2013 05:30 AM    Glucose 90 12/28/2017 10:32 AM    BUN 31 (H) 12/28/2017 10:32 AM    Creatinine 1.06 (H) 12/28/2017 10:32 AM    BUN/Creatinine ratio 29 (H) 12/28/2017 10:32 AM    GFR est AA 57 (L) 12/28/2017 10:32 AM    GFR est non-AA 49 (L) 12/28/2017 10:32 AM    Calcium 9.7 12/28/2017 10:32 AM    Bilirubin, total 0.2 12/28/2017 10:32 AM    AST (SGOT) 21 12/28/2017 10:32 AM    Alk. phosphatase 66 12/28/2017 10:32 AM    Protein, total 7.2 12/28/2017 10:32 AM    Albumin 4.2 12/28/2017 10:32 AM    Globulin 4.0 04/01/2013 02:35 AM    A-G Ratio 1.4 12/28/2017 10:32 AM    ALT (SGPT) 16 12/28/2017 10:32 AM          Assessment:       ICD-10-CM ICD-9-CM    1. Vertigo R42 780.4 AMB POC EKG ROUTINE W/ 12 LEADS, INTER & REP   2. Hypertension, essential I10 401.9 AMB POC EKG ROUTINE W/ 12 LEADS, INTER & REP   3. Hypercholesteremia E78.00 272.0    4. Gastroesophageal reflux disease without esophagitis K21.9 530.81    5. Gait instability--uses roller walker R26.81 781. 2          Discussion: Patient presents at this time stable from a cardiac perspective. Having episodes of what sounds like intermittent positional dizziness/vertigo--some episodes spontaneous. ? BPV. No orthostatic drops today. HR fixed. Has appt to see PCP today. May need NEURO/ENT EVAL. ? MRI. Doubt \"stoke/TIA\". Pleased with present cardiac status. Plan: 1. Continue same meds. Lipid profile and labs followed by PCP. 2.Encouraged to exercise to tolerance, lose weight and follow low fat, low cholesterol, low sodium predominantly Plant-based (consider Mediterranean) diet. Call with questions or concerns. Will follow up any test results by phone and/or f/u here in office if needed. Kamaljit Reyes 3.Follow up: 6 MONTHS    I have discussed the diagnosis with the patient and the intended plan as seen in the above orders. The patient has received an after-visit summary and questions were answered concerning future plans. I have discussed any concerning medication side effects and warnings with the patient as well.     Teagan Beach MD  7/25/2018

## 2018-07-25 NOTE — PROGRESS NOTES
Chief Complaint   Patient presents with    Hypertension     6 mo f/u, palpatations and dizziness yesterday     1. Have you been to the ER, urgent care clinic since your last visit? Hospitalized since your last visit? No    2. Have you seen or consulted any other health care providers outside of the [facility] since your last visit? Include any pap smears or colon screening.  No

## 2018-07-25 NOTE — PROGRESS NOTES
Chief Complaint   Patient presents with    Dizziness    Hypertension    Palpitations     1. Have you been to the ER, urgent care clinic since your last visit? Hospitalized since your last visit? No    2. Have you seen or consulted any other health care providers outside of the [facility] since your last visit? Include any pap smears or colon screening.  No     Health Maintenance Due   Topic Date Due    GLAUCOMA SCREENING Q2Y  08/01/2016    MEDICARE YEARLY EXAM  07/25/2018

## 2018-07-25 NOTE — PROGRESS NOTES
HISTORY OF PRESENT ILLNESS  Alex Luther is a 81 y.o. female. HPI woke up yesterday around midnight- room was spinning- lasted 15-30 minutes. Was unable to move. Sat on side of bed. Had another spell this am, not nearly as bad this am. Sometimes gets dizzy if gets up too fast. No headaches, tinnitus, hearing loss, dyspnea, chest pains. ROS    Physical Exam   Constitutional: She appears well-developed and well-nourished. HENT:   Right Ear: External ear normal.   Left Ear: External ear normal.   Mouth/Throat: Oropharynx is clear and moist.   Neck: No thyromegaly present. Cardiovascular: Normal rate, regular rhythm, normal heart sounds and intact distal pulses. Pulmonary/Chest: Breath sounds normal. She has no wheezes. Abdominal: Soft. Bowel sounds are normal. She exhibits no distension and no mass. There is no tenderness. Musculoskeletal: She exhibits no edema. Lymphadenopathy:     She has no cervical adenopathy. Neurological: No cranial nerve deficit. Cer- fnf intact  Gross motor intact   Nursing note and vitals reviewed. ASSESSMENT and PLAN  Orders Placed This Encounter    METABOLIC PANEL, COMPREHENSIVE    AMB POC COMPLETE CBC, AUTOMATED    losartan (COZAAR) 100 mg tablet    meclizine (ANTIVERT) 25 mg tablet     Diagnoses and all orders for this visit:    1. Dizziness  -     AMB POC COMPLETE CBC, AUTOMATED    2. Essential hypertension  -     losartan (COZAAR) 100 mg tablet; Take 1 Tab by mouth daily. For blood pressure  -     METABOLIC PANEL, COMPREHENSIVE    Other orders  -     meclizine (ANTIVERT) 25 mg tablet;  One tab every 8 hours as needed for dizziness      Follow-up Disposition: Not on File

## 2018-07-25 NOTE — MR AVS SNAPSHOT
[facility] 
 
 
 [address] [facility] [address] 
[phone] Patient: Alban Kraus MRN: [id number] GBV:[id number] Visit Information Date & Time Provider Department Dept. Phone Encounter #  
 7/25/2018  3:30 PM Caren Gallego MD [facility] [phone] [id number] Your Appointments 8/21/2018 10:45 AM  
ROUTINE CARE with Caren Gallego MD  
[facility] [address]) Appt Note: 6-mth f/up med ck-up jew07/23/18; r/s 6-mth f/up med ck-up [facility]07/23/18  
 [address] [facility] [address]  
[phone] [phone]  
  
    
 1/29/2019 10:15 AM  
ESTABLISHED PATIENT with Luke Mendoza MD  
[facility]) Appt Note: SIX MONTH FOLLOW UP-  [address] [place] [address]  
[phone] [address] [facility] [address] Upcoming Health Maintenance Date Due  
 GLAUCOMA SCREENING Q2Y 8/1/2016 MEDICARE YEARLY EXAM 7/25/2018 Influenza Age 5 to Adult 8/1/2018 DTaP/Tdap/Td series (2 - Td) 6/22/2025 Allergies as of 7/25/2018  Review Complete On: 7/25/2018 By: Caren Gallego MD  
  
 Severity Noted Reaction Type Reactions Hydrochlorothiazide  03/01/2010    Other (comments)  
 dizziness Current Immunizations  Reviewed on 1/15/2016 Name Date Influenza High Dose Vaccine PF 9/28/2017, 9/28/2015, 10/2/2014 Influenza Vaccine 9/14/2016, 9/9/2013 Influenza Vaccine Split 9/24/2012, 9/19/2011, 9/21/2010, 1/1/2009 Pneumococcal Conjugate (PCV-13) 10/26/2016 Tdap 6/22/2015 Varicella Virus Vaccine 11/10/2015 ZZZ-RETIRED (DO NOT USE) Pneumococcal Vaccine (Unspecified Type) 1/1/2009 Not reviewed this visit You Were Diagnosed With   
  
 Codes Comments Dizziness    -  Primary ICD-10-CM: K38 ICD-9-CM: 780.4 Essential hypertension     ICD-10-CM: I10 
ICD-9-CM: 401.9 Vitals BP Pulse Temp Resp Height(growth percentile) Weight(growth percentile) 149/63 68 97.8 °F (36.6 °C) (Oral) 16 5' (1.524 m) 114 lb 3.2 oz (51.8 kg) SpO2 BMI OB Status Smoking Status 100% 22.3 kg/m2 Hysterectomy Never Smoker Vitals History BMI and BSA Data Body Mass Index Body Surface Area  
 22.3 kg/m 2 1.48 m 2 Preferred Pharmacy Pharmacy Name Phone [id number] [address] Jennifer Toney [phone] Your Updated Medication List  
  
   
This list is accurate as of 7/25/18  4:37 PM.  Always use your most recent med list.  
  
  
  
  
 alendronate 70 mg tablet Commonly known as:  FOSAMAX  
take 1 tablet by mouth every week on an empty stomach with 8  
  
 atorvastatin 20 mg tablet Commonly known as:  LIPITOR  
take 1 tablet by mouth daily for cholesterol COLACE PO Take  by mouth daily. HI-CINTHIA PLUS VIT D 500 mg(1,250mg) -200 unit per tablet Generic drug:  calcium-vitamin D  
take 1 tablet by mouth twice a day  
  
 indapamide 2.5 mg tablet Commonly known as:  LOZOL  
take 1 tablet by mouth once daily for blood pressure  
  
 levothyroxine 50 mcg tablet Commonly known as:  SYNTHROID  
take 1 tablet by mouth once daily  
  
 losartan 100 mg tablet Commonly known as:  COZAAR Take 1 Tab by mouth daily. For blood pressure  
  
 meclizine 25 mg tablet Commonly known as:  ANTIVERT One tab every 8 hours as needed for dizziness  
  
 multivitamins-ca-iron-minerals Tab Commonly known as:  ONE DAILY Take one tablet by mouth once daily. omeprazole 40 mg capsule Commonly known as:  PRILOSEC  
take 1 capsule by mouth once daily FOR INDIGESTION  
  
 ONE DAILY ESSENTIAL 0.4 mg Tab Generic drug:  Vit B Comp & C-Vit E-FA-Cee-Zn  
take 1 tablet by mouth once daily SORE THROAT (BENZOCAINE-MENTH) 15-2.6 mg Lozg lozenge Generic drug:  benzocaine-menthol  
as needed. verapamil  mg CR tablet Commonly known as:  CALAN-SR  
take 1 tablet by mouth once daily for blood pressure VITAMIN B-12 PO Prescriptions Sent to Pharmacy Refills  
 losartan (COZAAR) 100 mg tablet 3 Sig: Take 1 Tab by mouth daily. For blood pressure Class: Normal  
 Pharmacy: [facility] [place] [address] [employer] [place] [place] [address] [employer] Ph #: [phone] Route: Oral  
 meclizine (ANTIVERT) 25 mg tablet 1 Sig: One tab every 8 hours as needed for dizziness Class: Normal  
 [facility] [place] [address] [employer] Ph #: [phone] We Performed the Following AMB POC COMPLETE CBC, AUTOMATED [77996 CPT(R)] METABOLIC PANEL, COMPREHENSIVE [65647 CPT(R)] Introducing [facility] & HEALTH SERVICES! [employer] introduces [employer]edia patient portal. Now you can access parts of your medical record, email your doctor's office, and request medication refills online. 1. In your internet browser, go to https://[employer]. [employer] 2. Click on the First Time User? Click Here link in the Sign In box. You will see the New Member Sign Up page. 3. Enter your mediafeedia Access Code exactly as it appears below. You will not need to use this code after youve completed the sign-up process. If you do not sign up before the expiration date, you must request a new code. · mediafeedia Access Code: [email or web address] Expires: 10/23/2018 11:17 AM 
 
4. Enter the last four digits of your Social Security Number (xxxx) and Date of Birth (mm/dd/yyyy) as indicated and click Submit. You will be taken to the next sign-up page. 5. Create a Fididelt ID. This will be your mediafeedia login ID and cannot be changed, so think of one that is secure and easy to remember. 6. Create a Fid[employer]elt password. You can change your password at any time. 7. Enter your Password Reset Question and Answer. This can be used at a later time if you forget your password. 8. Enter your e-mail address. You will receive e-mail notification when new information is available in [address] 9. Click Sign Up. You can now view and download portions of your medical record. 10. Click the Download Summary menu link to download a portable copy of your medical information. If you have questions, please visit the Frequently Asked Questions section of the Set.fm website. Remember, Set.fm is NOT to be used for urgent needs. For medical emergencies, dial 911. Now available from your iPhone and Android! Please provide this summary of care documentation to your next provider. Your primary care clinician is listed as Luis Jones. If you have any questions after today's visit, please call [phone].

## 2018-07-26 LAB
ALBUMIN SERPL-MCNC: 4.5 G/DL (ref 3.5–4.7)
ALBUMIN/GLOB SERPL: 1.4 {RATIO} (ref 1.2–2.2)
ALP SERPL-CCNC: 69 IU/L (ref 39–117)
ALT SERPL-CCNC: 18 IU/L (ref 0–32)
AST SERPL-CCNC: 27 IU/L (ref 0–40)
BILIRUB SERPL-MCNC: <0.2 MG/DL (ref 0–1.2)
BUN SERPL-MCNC: 26 MG/DL (ref 8–27)
BUN/CREAT SERPL: 24 (ref 12–28)
CALCIUM SERPL-MCNC: 9.6 MG/DL (ref 8.7–10.3)
CHLORIDE SERPL-SCNC: 103 MMOL/L (ref 96–106)
CO2 SERPL-SCNC: 22 MMOL/L (ref 20–29)
CREAT SERPL-MCNC: 1.07 MG/DL (ref 0.57–1)
GLOBULIN SER CALC-MCNC: 3.2 G/DL (ref 1.5–4.5)
GLUCOSE SERPL-MCNC: 107 MG/DL (ref 65–99)
INTERPRETATION: NORMAL
POTASSIUM SERPL-SCNC: 4.2 MMOL/L (ref 3.5–5.2)
PROT SERPL-MCNC: 7.7 G/DL (ref 6–8.5)
SODIUM SERPL-SCNC: 141 MMOL/L (ref 134–144)

## 2018-08-21 ENCOUNTER — OFFICE VISIT (OUTPATIENT)
Dept: FAMILY MEDICINE CLINIC | Age: 83
End: 2018-08-21

## 2018-08-21 VITALS
HEART RATE: 63 BPM | HEIGHT: 60 IN | BODY MASS INDEX: 22.34 KG/M2 | TEMPERATURE: 97.6 F | WEIGHT: 113.8 LBS | RESPIRATION RATE: 18 BRPM | OXYGEN SATURATION: 99 % | DIASTOLIC BLOOD PRESSURE: 58 MMHG | SYSTOLIC BLOOD PRESSURE: 158 MMHG

## 2018-08-21 DIAGNOSIS — I10 ESSENTIAL HYPERTENSION: Primary | ICD-10-CM

## 2018-08-21 RX ORDER — CLONIDINE HYDROCHLORIDE 0.1 MG/1
0.1 TABLET ORAL 2 TIMES DAILY
Qty: 180 TAB | Refills: 12 | Status: SHIPPED | OUTPATIENT
Start: 2018-08-21 | End: 2019-09-26

## 2018-08-21 NOTE — MR AVS SNAPSHOT
[address] [facility] 
 
 
 [address] [facility] [place] [address] [address] 
[phone] Patient: Radha Arteaga MRN: [id number] EIL:[birth year] Visit Information Date & Time Provider Department Dept. Phone Encounter #  
 8/21/2018 10:45 AM Varsha Richard MD [facility] [phone] [id number] Your Appointments 1/29/2019 10:15 AM  
ESTABLISHED PATIENT with MD [facility]) Appt Note: SIX MONTH FOLLOW UP-  [facility]  
 [address]  
[phone] [address] Upcoming Health Maintenance Date Due  
 GLAUCOMA SCREENING Q2Y 8/1/2016 Influenza Age 5 to Adult 8/1/2018 MEDICARE YEARLY EXAM 8/16/2018 DTaP/Tdap/Td series (2 - Td) 6/22/2025 Allergies as of 8/21/2018  Review Complete On: 8/21/2018 By: Varsha Richard MD  
  
 Severity Noted Reaction Type Reactions Hydrochlorothiazide  03/01/2010    Other (comments)  
 dizziness Current Immunizations  Reviewed on 1/15/2016 Name Date Influenza High Dose Vaccine PF 9/28/2017, 9/28/2015, 10/2/2014 Influenza Vaccine 9/14/2016, 9/9/2013 Influenza Vaccine Split 9/24/2012, 9/19/2011, 9/21/2010, 1/1/2009 Pneumococcal Conjugate (PCV-13) 10/26/2016 Tdap 6/22/2015 Varicella Virus Vaccine 11/10/2015 ZZZ-RETIRED (DO NOT USE) Pneumococcal Vaccine (Unspecified Type) 1/1/2009 Not reviewed this visit You Were Diagnosed With   
  
 Codes Comments Essential hypertension    -  Primary ICD-10-CM: I10 
ICD-9-CM: 401.9 Vitals BP Pulse Temp Resp Height(growth percentile) Weight(growth percentile) 158/58 (BP 1 Location: Right arm, BP Patient Position: Sitting) 63 97.6 °F (36.4 °C) (Oral) 18 5' (1.524 m) 113 lb 12.8 oz (51.6 kg) SpO2 BMI OB Status Smoking Status 99% 22.23 kg/m2 Hysterectomy Never Smoker BMI and BSA Data Body Mass Index Body Surface Area  
 22.23 kg/m 2 1.48 m 2 Preferred Pharmacy Pharmacy Name Phone [facility] [address], [address] [place] [address] Drive [phone] Your Updated Medication List  
  
   
This list is accurate as of 8/21/18 11:55 AM.  Always use your most recent med list.  
  
  
  
  
 alendronate 70 mg tablet Commonly known as:  FOSAMAX  
take 1 tablet by mouth every week on an empty stomach with 8  
  
 atorvastatin 20 mg tablet Commonly known as:  LIPITOR  
take 1 tablet by mouth daily for cholesterol  
  
 cloNIDine HCl 0.1 mg tablet Commonly known as:  CATAPRES Take 1 Tab by mouth two (2) times a day. For blood pressure COLACE PO Take  by mouth daily. HI-CINTHIA PLUS VIT D 500 mg(1,250mg) -200 unit per tablet Generic drug:  calcium-vitamin D  
take 1 tablet by mouth twice a day  
  
 indapamide 2.5 mg tablet Commonly known as:  LOZOL  
take 1 tablet by mouth once daily for blood pressure  
  
 levothyroxine 50 mcg tablet Commonly known as:  SYNTHROID  
take 1 tablet by mouth once daily  
  
 meclizine 25 mg tablet Commonly known as:  ANTIVERT One tab every 8 hours as needed for dizziness  
  
 multivitamins-ca-iron-minerals Tab Commonly known as:  ONE DAILY Take one tablet by mouth once daily. omeprazole 40 mg capsule Commonly known as:  PRILOSEC  
take 1 capsule by mouth once daily FOR INDIGESTION  
  
 ONE DAILY ESSENTIAL 0.4 mg Tab Generic drug:  Vit B Comp & C-Vit E-FA-Cee-Zn  
take 1 tablet by mouth once daily SORE THROAT (BENZOCAINE-MENTH) 15-2.6 mg Lozg lozenge Generic drug:  benzocaine-menthol  
as needed. verapamil  mg CR tablet Commonly known as:  CALAN-SR  
take 1 tablet by mouth once daily for blood pressure VITAMIN B-12 PO Prescriptions Sent to Pharmacy Refills  
 cloNIDine HCl (CATAPRES) 0.1 mg tablet 12 Sig: Take 1 Tab by mouth two (2) times a day. For blood pressure Class: Normal  
 Pharmacy: [facility] [address], [address] [place] [address] #: [phone] Route: Oral  
  
Introducing [facility] & HEALTH SERVICES! [employer] introduces [employer] patient portal. Now you can access parts of your medical record, email your doctor's office, and request medication refills online. 1. In your internet browser, go to [email or web address]. [employer] 2. Click on the First Time User? Click Here link in the Sign In box. You will see the New Member Sign Up page. 3. Enter your [employer] Access Code exactly as it appears below. You will not need to use this code after youve completed the sign-up process. If you do not sign up before the expiration date, you must request a new code. · [employer] Access Code: [email or web address] Expires: 10/23/2018 11:17 AM 
 
4. Enter the last four digits of your Social Security Number (xxxx) and Date of Birth (mm/dd/yyyy) as indicated and click Submit. You will be taken to the next sign-up page. 5. Create a [employer] ID. This will be your [employer] login ID and cannot be changed, so think of one that is secure and easy to remember. 6. Create a [employer] password. You can change your password at any time. 7. Enter your Password Reset Question and Answer. This can be used at a later time if you forget your password. 8. Enter your e-mail address. You will receive e-mail notification when new information is available in [address] 9. Click Sign Up. You can now view and download portions of your medical record. 10. Click the Download Summary menu link to download a portable copy of your medical information. If you have questions, please visit the Frequently Asked Questions section of the [employer] website.  Remember, [employer] is NOT to be used for urgent needs. For medical emergencies, dial 911. Now available from your iPhone and Android! Please provide this summary of care documentation to your next provider. Your primary care clinician is listed as Melodie Rodriguez. If you have any questions after today's visit, please call [phone].

## 2018-08-21 NOTE — PROGRESS NOTES
Chief Complaint   Patient presents with    Hypertension     follow up     1. Have you been to the ER, urgent care clinic since your last visit? Hospitalized since your last visit? No    2. Have you seen or consulted any other health care providers outside of the [address] [place] [address] since your last visit? Include any pap smears or colon screening.  No

## 2018-08-21 NOTE — PROGRESS NOTES
HISTORY OF PRESENT ILLNESS  Alex Avila is a 81 y.o. female. HPIComes in with sister Demetrius Cunningham. Was seen 2 weeks ago for vertigo, took 2 meclizine pills, and hasnt had any more problems. ROS    Physical Exam   Constitutional: She appears well-developed and well-nourished. HENT:   Right Ear: External ear normal.   Left Ear: External ear normal.   Mouth/Throat: Oropharynx is clear and moist.   Neck: No thyromegaly present. Cardiovascular: Normal rate, regular rhythm, normal heart sounds and intact distal pulses. Pulmonary/Chest: Breath sounds normal. She has no wheezes. Abdominal: Soft. Bowel sounds are normal. She exhibits no distension and no mass. There is no tenderness. Musculoskeletal: She exhibits no edema. Lymphadenopathy:     She has no cervical adenopathy. Nursing note and vitals reviewed. ASSESSMENT and PLAN  Orders Placed This Encounter    cloNIDine HCl (CATAPRES) 0.1 mg tablet     Sig: Take 1 Tab by mouth two (2) times a day. For blood pressure     Dispense:  180 Tab     Refill:  12     Orders Placed This Encounter    cloNIDine HCl (CATAPRES) 0.1 mg tablet     Diagnoses and all orders for this visit:    1. Essential hypertension    Other orders  -     cloNIDine HCl (CATAPRES) 0.1 mg tablet; Take 1 Tab by mouth two (2) times a day.  For blood pressure

## 2018-11-28 ENCOUNTER — TELEPHONE (OUTPATIENT)
Dept: FAMILY MEDICINE CLINIC | Age: 83
End: 2018-11-28

## 2018-11-28 ENCOUNTER — OFFICE VISIT (OUTPATIENT)
Dept: FAMILY MEDICINE CLINIC | Age: 83
End: 2018-11-28

## 2018-11-28 VITALS
HEIGHT: 60 IN | DIASTOLIC BLOOD PRESSURE: 85 MMHG | SYSTOLIC BLOOD PRESSURE: 148 MMHG | BODY MASS INDEX: 21.91 KG/M2 | OXYGEN SATURATION: 100 % | HEART RATE: 62 BPM | WEIGHT: 111.6 LBS | RESPIRATION RATE: 14 BRPM | TEMPERATURE: 97.4 F

## 2018-11-28 DIAGNOSIS — I10 ESSENTIAL HYPERTENSION: Primary | ICD-10-CM

## 2018-11-28 NOTE — PROGRESS NOTES
Health Maintenance Due Topic Date Due  Shingrix Vaccine Age 50> (1 of 2) 05/13/1986  GLAUCOMA SCREENING Q2Y  08/01/2016  MEDICARE YEARLY EXAM  11/22/2018 1. Have you been to the ER, urgent care clinic since your last visit? Hospitalized since your last visit? No 
 
2. Have you seen or consulted any other health care providers outside of the [address] [place] [address] since your last visit? Include any pap smears or colon screening. No  
 
Health Maintenance Due Topic Date Due  Shingrix Vaccine Age 50> (1 of 2) 05/13/1986  GLAUCOMA SCREENING Q2Y  08/01/2016  MEDICARE YEARLY EXAM  11/22/2018

## 2018-11-28 NOTE — PROGRESS NOTES
HISTORY OF PRESENT ILLNESS Gari Cushing is a 81 y.o. female. HPI Comes in with sister \[facility] [place] [facility]\"". In for blood pressure and cholesterol check. No complaints of chest pain, shortness of breath, TIAs, claudication or edema. Dizziness is much improved since decreasing clonidine dose. ROS Physical Exam  
Constitutional: She appears well-developed and well-nourished. HENT:  
Right Ear: External ear normal.  
Left Ear: External ear normal.  
Mouth/Throat: Oropharynx is clear and moist.  
Neck: No thyromegaly present. Cardiovascular: Normal rate, regular rhythm, normal heart sounds and intact distal pulses. Pulmonary/Chest: Breath sounds normal. She has no wheezes. Br- no mass Abdominal: Soft. Bowel sounds are normal. She exhibits no distension and no mass. There is no tenderness. Musculoskeletal: She exhibits no edema. Lymphadenopathy:  
  She has no cervical adenopathy. Nursing note and vitals reviewed. ASSESSMENT and PLAN Orders Placed This Encounter  CBC WITH AUTOMATED DIFF Diagnoses and all orders for this visit: 1. Essential hypertension 
-     CBC WITH AUTOMATED DIFF Follow-up Disposition: 
Return in about 4 months (around 3/28/2019).

## 2018-11-29 LAB
BASOPHILS # BLD AUTO: 0 X10E3/UL (ref 0–0.2)
BASOPHILS NFR BLD AUTO: 1 %
EOSINOPHIL # BLD AUTO: 0.1 X10E3/UL (ref 0–0.4)
EOSINOPHIL NFR BLD AUTO: 2 %
ERYTHROCYTE [DISTWIDTH] IN BLOOD BY AUTOMATED COUNT: 14.8 % (ref 12.3–15.4)
HCT VFR BLD AUTO: 34.3 % (ref 34–46.6)
HGB BLD-MCNC: 11.1 G/DL (ref 11.1–15.9)
IMM GRANULOCYTES # BLD: 0 X10E3/UL (ref 0–0.1)
IMM GRANULOCYTES NFR BLD: 0 %
LYMPHOCYTES # BLD AUTO: 2.3 X10E3/UL (ref 0.7–3.1)
LYMPHOCYTES NFR BLD AUTO: 40 %
MCH RBC QN AUTO: 26.9 PG (ref 26.6–33)
MCHC RBC AUTO-ENTMCNC: 32.4 G/DL (ref 31.5–35.7)
MCV RBC AUTO: 83 FL (ref 79–97)
MONOCYTES # BLD AUTO: 0.2 X10E3/UL (ref 0.1–0.9)
MONOCYTES NFR BLD AUTO: 4 %
NEUTROPHILS # BLD AUTO: 3.1 X10E3/UL (ref 1.4–7)
NEUTROPHILS NFR BLD AUTO: 53 %
PLATELET # BLD AUTO: 287 X10E3/UL (ref 150–379)
RBC # BLD AUTO: 4.12 X10E6/UL (ref 3.77–5.28)
WBC # BLD AUTO: 5.8 X10E3/UL (ref 3.4–10.8)

## 2018-12-06 RX ORDER — LEVOTHYROXINE SODIUM 50 UG/1
TABLET ORAL
Qty: 90 TAB | Refills: 12 | Status: SHIPPED | OUTPATIENT
Start: 2018-12-06 | End: 2020-01-08 | Stop reason: SDUPTHER

## 2018-12-06 NOTE — TELEPHONE ENCOUNTER
Last Visit: 11/28  Next Appt: 3/26  Previous Refill Encounter: 10/8/17-90+12    Requested Prescriptions     Pending Prescriptions Disp Refills    levothyroxine (SYNTHROID) 50 mcg tablet 90 Tab 12     Sig: take 1 tablet by mouth once daily

## 2019-01-29 ENCOUNTER — OFFICE VISIT (OUTPATIENT)
Dept: CARDIOLOGY CLINIC | Age: 84
End: 2019-01-29

## 2019-01-29 VITALS
BODY MASS INDEX: 18.38 KG/M2 | DIASTOLIC BLOOD PRESSURE: 80 MMHG | WEIGHT: 93.6 LBS | HEIGHT: 60 IN | SYSTOLIC BLOOD PRESSURE: 120 MMHG | RESPIRATION RATE: 16 BRPM | OXYGEN SATURATION: 98 % | HEART RATE: 75 BPM

## 2019-01-29 DIAGNOSIS — E78.00 HYPERCHOLESTEREMIA: ICD-10-CM

## 2019-01-29 DIAGNOSIS — I10 ESSENTIAL HYPERTENSION: Primary | ICD-10-CM

## 2019-01-29 DIAGNOSIS — R26.81 GAIT INSTABILITY: ICD-10-CM

## 2019-01-29 NOTE — PROGRESS NOTES
Chief Complaint Patient presents with  Hypertension 6 month follow up 1. Have you been to the ER, urgent care clinic since your last visit? Hospitalized since your last visit? No 
 
2. Have you seen or consulted any other health care providers outside of the [address] [place] [address] since your last visit? Include any pap smears or colon screening.  No

## 2019-01-29 NOTE — PROGRESS NOTES
[facility]  
[facility] [place] [address], [address] [facility]. [address] NEW PATIENT HPI/FOLLOW-UP 
 
 
NAME:  Alex Lord :   1935 MRN:   [id number] PCP:  Deirdre Seaman MD 
 
    
 
Subjective: The patient is a 81y.o. year old female  who returns for a routine follow-up. Since the last visit, patient reports no new symptoms. Dizziness/vertigo quickly resolved in  when Clonidine dose changed by PCP but dose appears to be same. No recurrence. Denies change in exercise tolerance, chest pain, edema, medication intolerance, palpitations, shortness of breath, PND/orthopnea wheezing, sputum, syncope, dizziness or light headedness. Doing satisfactorily. Review of SystemsGeneral: Pt losing weight according to Sister with her. Pt is not able to conduct ADL's. Respiratory: Denies shortness of breath, EVANS, wheezing or stridor. Cardiovascular: Denies precordial pain, palpitations, edema or PND Gastrointestinal: Denies poor appetite, indigestion, abdominal pain or blood in stool Peripheral vascular: Denies claudication, leg cramps Neuropsychiatric: Denies paresthesias,tingling,numbness,anxiety,depression,fatigue Musculoskeletal: Denies pain,tenderness, soreness,swelling Past Medical History:  
Diagnosis Date  GERD (gastroesophageal reflux disease)  EGD- reflux esophagitis  Hypercholesterolemia  Hypertension  Osteopenia 11/15/2011  Pulmonary embolus (HCC)   
 hx. of PE after surgery  Thyroid disease Patient Active Problem List  
 Diagnosis Date Noted  Respiratory failure ([facility] 2010 Priority: 1 - One  Vertigo 2018  Palpitations 2016  Gait instability--uses roller walker 2016  
 SBO (small bowel obstruction) ([facility] 2013  Esophageal mass 2013  Osteopenia 11/15/2011  Hernia of unspecified site of abdominal cavity without mention of obstruction or gangrene 05/12/2011  Hypertension 03/01/2010  Hypercholesteremia 03/01/2010  Pulmonary embolus ([address] 03/01/2010  GERD (gastroesophageal reflux disease) Past Surgical History:  
Procedure Laterality Date  COLONOSCOPY    
 armando-internal hemorrhoids  HX GI    
 bowel obstruction 12-  HX HERNIA REPAIR  5/11/11  HX HYSTERECTOMY  HX ORTHOPAEDIC  2011  
 toe sx-bilateral  
 HX SMALL BOWEL RESECTION    
 4-18-10  IL FREEING BOWEL ADHESION,ENTEROLYSIS    
 REPAIR ING HERNIA,5+Y/O,REDUCIBL  5/11/11 Allergies Allergen Reactions  Hydrochlorothiazide Other (comments)  
  dizziness History reviewed. No pertinent family history. Social History Socioeconomic History  Marital status:  Spouse name: Not on file  Number of children: Not on file  Years of education: Not on file  Highest education level: Not on file Social Needs  Financial resource strain: Not on file  Food insecurity - worry: Not on file  Food insecurity - inability: Not on file  Transportation needs - medical: Not on file  Transportation needs - non-medical: Not on file Occupational History  Not on file Tobacco Use  Smoking status: Never Smoker  Smokeless tobacco: Never Used Substance and Sexual Activity  Alcohol use: No  
 Drug use: No  
 Sexual activity: Not on file Other Topics Concern  Not on file Social History Narrative Currently lives by herself, brought in by sister. Sister lives 5 minutes away. Current Outpatient Medications Medication Sig  levothyroxine (SYNTHROID) 50 mcg tablet take 1 tablet by mouth once daily  cloNIDine HCl (CATAPRES) 0.1 mg tablet Take 1 Tab by mouth two (2) times a day. For blood pressure  omeprazole (PRILOSEC) 40 mg capsule take 1 capsule by mouth once daily FOR INDIGESTION  verapamil ER (CALAN-SR) 240 mg CR tablet take 1 tablet by mouth once daily for blood pressure  alendronate (FOSAMAX) 70 mg tablet take 1 tablet by mouth every week on an empty stomach with 8  
 DOCUSATE SODIUM (COLACE PO) Take  by mouth daily.  indapamide (LOZOL) 2.5 mg tablet take 1 tablet by mouth once daily for blood pressure  SORE THROAT, BENZOCAINE-MENTH, 15-2.6 mg lozg lozenge as needed.  HI-CINTHIA PLUS VIT D 500 mg(1,250mg) -200 unit per tablet take 1 tablet by mouth twice a day  multivitamins-ca-iron-minerals (ONE DAILY) Tab Take one tablet by mouth once daily.  meclizine (ANTIVERT) 25 mg tablet One tab every 8 hours as needed for dizziness  ONE DAILY ESSENTIAL 0.4 mg tab take 1 tablet by mouth once daily  atorvastatin (LIPITOR) 20 mg tablet take 1 tablet by mouth daily for cholesterol  CYANOCOBALAMIN, VITAMIN B-12, (VITAMIN B-12 PO) No current facility-administered medications for this visit. I have reviewed the nurses notes, vitals, problem list, allergy list, medical history, family medical, social history and medications. Objective:  
 
Physical Exam:  
 
Vitals:  
 01/29/19 1100 01/29/19 1111 BP: 130/70 120/80 Pulse: 75 Resp: 16 SpO2: 98% Weight: 93 lb 9.6 oz (42.5 kg) Height: 5' (1.524 m) Body mass index is 18.28 kg/m². General: Well developed, in no acute distress. HEENT: No carotid bruits, no JVD, trach is midline. Heart:  Normal S1/S2 negative S3 or S4. Regular, no murmur, gallop or rub.  
Respiratory: Clear bilaterally, no wheezing or rales Abdomen:   Soft, non-tender, bowel sounds are active.  
Extremities:  No edema, normal cap refill, no cyanosis. Neuro: A&Ox3, speech clear, gait stable. Skin: Skin color is normal. No rashes or lesions. No diaphoresis. Vascular: 2+ pulses symmetric in all extremities Data Review:  
 
 
Cardiographics: 
 
EKG: NSR,TREASURE,baseline artifact, possible LVH Cardiology Labs: 
 
Results for orders placed or performed during the hospital encounter of 03/31/13 EKG, 12 LEAD, INITIAL Result Value Ref Range Ventricular Rate 78 BPM  
 Atrial Rate 78 BPM  
 P-R Interval 114 ms QRS Duration 92 ms Q-T Interval 374 ms QTC Calculation (Bezet) 426 ms Calculated R Axis 62 degrees Calculated T Axis 34 degrees Diagnosis Normal sinus rhythm When compared with ECG of 19-APR-2010 10:27, Sinus rhythm has replaced Junctional rhythm Confirmed by Ty Atwood ([id number]) on 4/1/2013 1:05:06 PM  
Results for orders placed or performed in visit on 03/22/12 AMB POC EKG ROUTINE W/ 12 LEADS, INTER & REP Impression Pvcs, nsr Lab Results Component Value Date/Time Cholesterol, total 231 (H) 12/28/2017 10:32 AM  
 HDL Cholesterol 116 12/28/2017 10:32 AM  
 LDL, calculated 95 12/28/2017 10:32 AM  
 Triglyceride 102 12/28/2017 10:32 AM  
 CHOL/HDL Ratio 2.5 01/30/2009 03:11 PM  
 
 
Lab Results Component Value Date/Time Sodium 141 07/25/2018 04:47 PM  
 Potassium 4.2 07/25/2018 04:47 PM  
 Chloride 103 07/25/2018 04:47 PM  
 CO2 22 07/25/2018 04:47 PM  
 Anion gap 11 04/10/2013 05:30 AM  
 Glucose 107 (H) 07/25/2018 04:47 PM  
 BUN 26 07/25/2018 04:47 PM  
 Creatinine 1.07 (H) 07/25/2018 04:47 PM  
 BUN/Creatinine ratio 24 07/25/2018 04:47 PM  
 GFR est AA 56 (L) 07/25/2018 04:47 PM  
 GFR est non-AA 48 (L) 07/25/2018 04:47 PM  
 Calcium 9.6 07/25/2018 04:47 PM  
 Bilirubin, total <0.2 07/25/2018 04:47 PM  
 AST (SGOT) 27 07/25/2018 04:47 PM  
 Alk. phosphatase 69 07/25/2018 04:47 PM  
 Protein, total 7.7 07/25/2018 04:47 PM  
 Albumin 4.5 07/25/2018 04:47 PM  
 Globulin 4.0 04/01/2013 02:35 AM  
 A-G Ratio 1.4 07/25/2018 04:47 PM  
 ALT (SGPT) 18 07/25/2018 04:47 PM  
  
 
 
Assessment: ICD-10-CM ICD-9-CM 1. Essential hypertension I10 401.9 AMB POC EKG ROUTINE W/ 12 LEADS, INTER & REP 2. Gait instability--uses roller walker R26.81 781.2 3. Hypercholesteremia E78.00 272.0 Discussion: Patient presents at this time stable from a cardiac perspective. No recurrent vertigo. BP at goal. Here with sister who is primary caregiver. Pleased with present cardiac status. Plan: 1. Continue same meds. Lipid profile and labs followed by PCP. 2.Encouraged to exercise to tolerance and follow low fat, low cholesterol, low sodium predominantly Plant-based (consider Mediterranean) diet. Call with questions or concerns. Will follow up any test results by phone and/or f/u here in office if needed. Harman Vinson 3.Follow up: 6 MONTHS I have discussed the diagnosis with the patient and the intended plan as seen in the above orders. The patient has received an after-visit summary and questions were answered concerning future plans. I have discussed any concerning medication side effects and warnings with the patient as well. Fatimah Medina MD 
1/29/2019

## 2019-02-26 RX ORDER — ATORVASTATIN CALCIUM 20 MG/1
TABLET, FILM COATED ORAL
Qty: 90 TAB | Refills: 3 | Status: SHIPPED | OUTPATIENT
Start: 2019-02-26 | End: 2020-03-23 | Stop reason: SDUPTHER

## 2019-02-26 NOTE — TELEPHONE ENCOUNTER
----- Message from Tonio Valadez sent at 2/26/2019 11:03 AM EST -----  Regarding: Dr. Moe Phoenix pt sister requests that her prescription for \"Atorvastacin 20mg\" be sent to the [facility] on [address] [facility] [address] [facility]. Best contact number is [phone].

## 2019-02-26 NOTE — TELEPHONE ENCOUNTER
Last Visit: 11/28  Next Appt: 3/26  Previous Refill Encounter: 12/28-30+12    Requested Prescriptions     Pending Prescriptions Disp Refills    atorvastatin (LIPITOR) 20 mg tablet 90 Tab 3     Sig: take 1 tablet by mouth daily for cholesterol

## 2019-03-26 ENCOUNTER — OFFICE VISIT (OUTPATIENT)
Dept: FAMILY MEDICINE CLINIC | Age: 84
End: 2019-03-26

## 2019-03-26 VITALS
DIASTOLIC BLOOD PRESSURE: 63 MMHG | BODY MASS INDEX: 21.75 KG/M2 | RESPIRATION RATE: 14 BRPM | HEART RATE: 70 BPM | HEIGHT: 60 IN | SYSTOLIC BLOOD PRESSURE: 149 MMHG | WEIGHT: 110.8 LBS | TEMPERATURE: 97.8 F | OXYGEN SATURATION: 100 %

## 2019-03-26 DIAGNOSIS — E03.9 ACQUIRED HYPOTHYROIDISM: ICD-10-CM

## 2019-03-26 DIAGNOSIS — I10 ESSENTIAL HYPERTENSION: ICD-10-CM

## 2019-03-26 DIAGNOSIS — Z00.00 ENCOUNTER FOR MEDICARE ANNUAL WELLNESS EXAM: Primary | ICD-10-CM

## 2019-03-26 DIAGNOSIS — E78.00 HYPERCHOLESTEROLEMIA: ICD-10-CM

## 2019-03-26 RX ORDER — INDAPAMIDE 1.25 MG/1
TABLET, FILM COATED ORAL
Qty: 90 TAB | Refills: 3 | Status: SHIPPED | OUTPATIENT
Start: 2019-03-26 | End: 2020-05-21 | Stop reason: SDUPTHER

## 2019-03-26 RX ORDER — ALENDRONATE SODIUM 70 MG/1
TABLET ORAL
Qty: 4 TAB | Refills: 13 | Status: SHIPPED | OUTPATIENT
Start: 2019-03-26 | End: 2020-01-08 | Stop reason: SDUPTHER

## 2019-03-26 NOTE — PROGRESS NOTES
Chief Complaint   Patient presents with    Hypertension    Dizziness    Annual Wellness Visit       1. Have you been to the ER, urgent care clinic since your last visit? Hospitalized since your last visit? No    2. Have you seen or consulted any other health care providers outside of the [address] [place] [address] since your last visit? Include any pap smears or colon screening.  No     Health Maintenance Due   Topic Date Due    Shingrix Vaccine Age 49> (1 of 2) 05/13/1986    GLAUCOMA SCREENING Q2Y  08/01/2016    MEDICARE YEARLY EXAM  02/02/2019

## 2019-03-26 NOTE — PROGRESS NOTES
HISTORY OF PRESENT ILLNESS  Alex Ohara is a 81 y.o. female. HPI Pt. Comes in for blood pressure and cholesterol check. Gets dizzy in am, when gets out of bed, can last for 30 minutes. Usually sits up when gets dizzy. No fainting. No complaints of chest pain, shortness of breath, TIAs, claudication or edema. Still lives by herself. Sister Skinny Falliraida checks on her everyday. Sees Dr. Tiffany Argueta (podiatry), Dr. Paulette Cuevas (cardiology). UTD on immunizations. Review of Systems   HENT: Negative for hearing loss. Neurological:        No falls. Uses a walker, no cane. Independent in all ADLS. Psychiatric/Behavioral: Negative for depression. Feels safe at home. No alcohol. Physical Exam   Constitutional: She appears well-developed and well-nourished. HENT:   Right Ear: External ear normal.   Left Ear: External ear normal.   Mouth/Throat: Oropharynx is clear and moist.   Neck: No thyromegaly present. Cardiovascular: Normal rate, regular rhythm, normal heart sounds and intact distal pulses. Pulmonary/Chest: Breath sounds normal. She has no wheezes. Abdominal: Soft. Bowel sounds are normal. She exhibits no distension and no mass. There is no tenderness. Musculoskeletal: She exhibits no edema. Lymphadenopathy:     She has no cervical adenopathy. Neurological:   AMT4 4/4   Nursing note and vitals reviewed. ASSESSMENT and PLAN  Orders Placed This Encounter    CBC WITH AUTOMATED DIFF    LIPID PANEL    METABOLIC PANEL, COMPREHENSIVE    TSH 3RD GENERATION    alendronate (FOSAMAX) 70 mg tablet    indapamide (LOZOL) 1.25 mg tablet     Diagnoses and all orders for this visit:    1. Encounter for Medicare annual wellness exam    2. Essential hypertension  -     CBC WITH AUTOMATED DIFF  -     METABOLIC PANEL, COMPREHENSIVE    3. Hypercholesterolemia  -     LIPID PANEL    4.  Acquired hypothyroidism  -     TSH 3RD GENERATION    Other orders  -     alendronate (FOSAMAX) 70 mg tablet; take 1 tablet by mouth every week on an empty stomach with 8 ounces of water. For bones  -     indapamide (LOZOL) 1.25 mg tablet; take 1 tablet by mouth once daily for blood pressure      Follow-up and Dispositions    · Return in about 6 months (around 9/26/2019).

## 2019-03-27 LAB
ALBUMIN SERPL-MCNC: 4.2 G/DL (ref 3.5–4.7)
ALBUMIN/GLOB SERPL: 1.2 {RATIO} (ref 1.2–2.2)
ALP SERPL-CCNC: 77 IU/L (ref 39–117)
ALT SERPL-CCNC: 20 IU/L (ref 0–32)
AST SERPL-CCNC: 26 IU/L (ref 0–40)
BASOPHILS # BLD AUTO: 0 X10E3/UL (ref 0–0.2)
BASOPHILS NFR BLD AUTO: 0 %
BILIRUB SERPL-MCNC: 0.2 MG/DL (ref 0–1.2)
BUN SERPL-MCNC: 20 MG/DL (ref 8–27)
BUN/CREAT SERPL: 19 (ref 12–28)
CALCIUM SERPL-MCNC: 9.8 MG/DL (ref 8.7–10.3)
CHLORIDE SERPL-SCNC: 101 MMOL/L (ref 96–106)
CHOLEST SERPL-MCNC: 215 MG/DL (ref 100–199)
CO2 SERPL-SCNC: 22 MMOL/L (ref 20–29)
CREAT SERPL-MCNC: 1.07 MG/DL (ref 0.57–1)
EOSINOPHIL # BLD AUTO: 0.2 X10E3/UL (ref 0–0.4)
EOSINOPHIL NFR BLD AUTO: 3 %
ERYTHROCYTE [DISTWIDTH] IN BLOOD BY AUTOMATED COUNT: 14.9 % (ref 12.3–15.4)
GLOBULIN SER CALC-MCNC: 3.4 G/DL (ref 1.5–4.5)
GLUCOSE SERPL-MCNC: 75 MG/DL (ref 65–99)
HCT VFR BLD AUTO: 33.3 % (ref 34–46.6)
HDLC SERPL-MCNC: 111 MG/DL
HGB BLD-MCNC: 10.8 G/DL (ref 11.1–15.9)
IMM GRANULOCYTES # BLD AUTO: 0 X10E3/UL (ref 0–0.1)
IMM GRANULOCYTES NFR BLD AUTO: 0 %
INTERPRETATION, 910389: NORMAL
INTERPRETATION: NORMAL
LDLC SERPL CALC-MCNC: 90 MG/DL (ref 0–99)
LYMPHOCYTES # BLD AUTO: 2.3 X10E3/UL (ref 0.7–3.1)
LYMPHOCYTES NFR BLD AUTO: 37 %
MCH RBC QN AUTO: 27.1 PG (ref 26.6–33)
MCHC RBC AUTO-ENTMCNC: 32.4 G/DL (ref 31.5–35.7)
MCV RBC AUTO: 84 FL (ref 79–97)
MONOCYTES # BLD AUTO: 0.4 X10E3/UL (ref 0.1–0.9)
MONOCYTES NFR BLD AUTO: 6 %
NEUTROPHILS # BLD AUTO: 3.3 X10E3/UL (ref 1.4–7)
NEUTROPHILS NFR BLD AUTO: 54 %
PDF IMAGE, 910387: NORMAL
PLATELET # BLD AUTO: 283 X10E3/UL (ref 150–379)
POTASSIUM SERPL-SCNC: 4.1 MMOL/L (ref 3.5–5.2)
PROT SERPL-MCNC: 7.6 G/DL (ref 6–8.5)
RBC # BLD AUTO: 3.98 X10E6/UL (ref 3.77–5.28)
SODIUM SERPL-SCNC: 141 MMOL/L (ref 134–144)
TRIGL SERPL-MCNC: 72 MG/DL (ref 0–149)
TSH SERPL DL<=0.005 MIU/L-ACNC: 1.26 UIU/ML (ref 0.45–4.5)
VLDLC SERPL CALC-MCNC: 14 MG/DL (ref 5–40)
WBC # BLD AUTO: 6.1 X10E3/UL (ref 3.4–10.8)

## 2019-08-07 RX ORDER — VERAPAMIL HYDROCHLORIDE 240 MG/1
TABLET, FILM COATED, EXTENDED RELEASE ORAL
Qty: 90 TAB | Refills: 3 | Status: SHIPPED | OUTPATIENT
Start: 2019-08-07 | End: 2020-02-26

## 2019-08-21 ENCOUNTER — OFFICE VISIT (OUTPATIENT)
Dept: CARDIOLOGY CLINIC | Age: 84
End: 2019-08-21

## 2019-08-21 VITALS
DIASTOLIC BLOOD PRESSURE: 64 MMHG | RESPIRATION RATE: 16 BRPM | HEART RATE: 75 BPM | SYSTOLIC BLOOD PRESSURE: 172 MMHG | WEIGHT: 106 LBS | HEIGHT: 60 IN | BODY MASS INDEX: 20.81 KG/M2 | OXYGEN SATURATION: 99 %

## 2019-08-21 DIAGNOSIS — I10 ESSENTIAL HYPERTENSION: Primary | ICD-10-CM

## 2019-08-21 DIAGNOSIS — R42 VERTIGO: ICD-10-CM

## 2019-08-21 DIAGNOSIS — R26.81 GAIT INSTABILITY: ICD-10-CM

## 2019-08-21 DIAGNOSIS — E78.00 HYPERCHOLESTEREMIA: ICD-10-CM

## 2019-08-21 DIAGNOSIS — K21.00 GASTROESOPHAGEAL REFLUX DISEASE WITH ESOPHAGITIS: ICD-10-CM

## 2019-08-21 NOTE — PROGRESS NOTES
Chief Complaint   Patient presents with    Follow-up    Hypertension     1. Have you been to the ER, urgent care clinic since your last visit? Hospitalized since your last visit? No    2. Have you seen or consulted any other health care providers outside of the [address] [place] [address] since your last visit? Include any pap smears or colon screening.  No

## 2019-08-21 NOTE — PROGRESS NOTES
[facility]   [address] [facility], [address] [place] [address]                                          NEW PATIENT HPI/FOLLOW-UP      NAME:  Gary Lucas   :   1935   MRN:   [id number]   PCP:  Sandi Heller MD           Subjective: The patient is a 81y.o. year old female  who returns for a routine follow-up. Since the last visit, patient reports no new symptoms. Denies change in exercise tolerance, chest pain, edema, medication intolerance, palpitations, shortness of breath, PND/orthopnea wheezing, sputum, syncope, dizziness or light headedness. Doing satisfactorily. Review of Systems  General: Pt denies excessive weight gain or loss. Pt is able to conduct ADL's. Respiratory: Denies shortness of breath, EVANS, wheezing or stridor.   Cardiovascular: Denies precordial pain, palpitations, edema or PND  Gastrointestinal: Denies poor appetite, indigestion, abdominal pain or blood in stool  Peripheral vascular: Denies claudication, leg cramps  Neuropsychiatric: Denies paresthesias,tingling,numbness,anxiety,depression,fatigue  Musculoskeletal: Denies pain,tenderness, soreness,swelling      Past Medical History:   Diagnosis Date    GERD (gastroesophageal reflux disease)     EGD- reflux esophagitis    Hypercholesterolemia     Hypertension     Osteopenia 11/15/2011    Pulmonary embolus (HCC)     hx. of PE after surgery    Thyroid disease      Patient Active Problem List    Diagnosis Date Noted    Respiratory failure ([address] 2010     Priority: 1 - One    Vertigo 2018    Palpitations 2016    Gait instability--uses roller walker 2016    SBO (small bowel obstruction) ([address] 2013    Esophageal mass 2013    Osteopenia 11/15/2011    Hernia of unspecified site of abdominal cavity without mention of obstruction or gangrene 2011    Essential hypertension 2010    Hypercholesteremia 2010    Pulmonary embolus ([address] 03/01/2010    GERD (gastroesophageal reflux disease)       Past Surgical History:   Procedure Laterality Date    COLONOSCOPY      armando-internal hemorrhoids    HX GI      bowel obstruction 12-    HX HERNIA REPAIR  5/11/11    HX HYSTERECTOMY      HX ORTHOPAEDIC  2011    toe sx-bilateral    HX SMALL BOWEL RESECTION      4-18-10    CT FREEING BOWEL ADHESION,ENTEROLYSIS      REPAIR ING HERNIA,5+Y/O,REDUCIBL  5/11/11     Allergies   Allergen Reactions    Hydrochlorothiazide Other (comments)     dizziness      History reviewed. No pertinent family history.    Social History     Socioeconomic History    Marital status:      Spouse name: Not on file    Number of children: Not on file    Years of education: Not on file    Highest education level: Not on file   Occupational History    Not on file   Social Needs    Financial resource strain: Not on file    Food insecurity:     Worry: Not on file     Inability: Not on file    Transportation needs:     Medical: Not on file     Non-medical: Not on file   Tobacco Use    Smoking status: Never Smoker    Smokeless tobacco: Never Used   Substance and Sexual Activity    Alcohol use: No    Drug use: No    Sexual activity: Not on file   Lifestyle    Physical activity:     Days per week: Not on file     Minutes per session: Not on file    Stress: Not on file   Relationships    Social connections:     Talks on phone: Not on file     Gets together: Not on file     Attends Orthodoxy service: Not on file     Active member of club or organization: Not on file     Attends meetings of clubs or organizations: Not on file     Relationship status: Not on file    Intimate partner violence:     Fear of current or ex partner: Not on file     Emotionally abused: Not on file     Physically abused: Not on file     Forced sexual activity: Not on file   Other Topics Concern    Not on file   Social History Narrative    Currently lives by herself, brought in by sister. Sister lives 5 minutes away. Current Outpatient Medications   Medication Sig    verapamil ER (CALAN-SR) 240 mg CR tablet take 1 tablet by mouth once daily for blood pressure    alendronate (FOSAMAX) 70 mg tablet take 1 tablet by mouth every week on an empty stomach with 8 ounces of water. For bones    indapamide (LOZOL) 1.25 mg tablet take 1 tablet by mouth once daily for blood pressure    atorvastatin (LIPITOR) 20 mg tablet take 1 tablet by mouth daily for cholesterol    levothyroxine (SYNTHROID) 50 mcg tablet take 1 tablet by mouth once daily    cloNIDine HCl (CATAPRES) 0.1 mg tablet Take 1 Tab by mouth two (2) times a day. For blood pressure    meclizine (ANTIVERT) 25 mg tablet One tab every 8 hours as needed for dizziness    omeprazole (PRILOSEC) 40 mg capsule take 1 capsule by mouth once daily FOR INDIGESTION    ONE DAILY ESSENTIAL 0.4 mg tab take 1 tablet by mouth once daily    DOCUSATE SODIUM (COLACE PO) Take  by mouth daily.  CYANOCOBALAMIN, VITAMIN B-12, (VITAMIN B-12 PO)     SORE THROAT, BENZOCAINE-MENTH, 15-2.6 mg lozg lozenge as needed.  HI-CINTHIA PLUS VIT D 500 mg(1,250mg) -200 unit per tablet take 1 tablet by mouth twice a day    multivitamins-ca-iron-minerals (ONE DAILY) Tab Take one tablet by mouth once daily. No current facility-administered medications for this visit. I have reviewed the nurses notes, vitals, problem list, allergy list, medical history, family medical, social history and medications. Objective:     Physical Exam:     Vitals:    08/21/19 0903   BP: 172/64   Pulse: 75   Resp: 16   SpO2: 99%   Weight: 106 lb (48.1 kg)   Height: 5' (1.524 m)    Body mass index is 20.7 kg/m². General: Well developed, in no acute distress. Has walker. HEENT: No carotid bruits, no JVD, trach is midline. Heart:  Normal S1/S2 negative S3 or S4.  Regular, no murmur, gallop or rub.   Respiratory: Clear bilaterally, no wheezing or rales  Abdomen:   Soft, non-tender, bowel sounds are active.   Extremities:  No edema, normal cap refill, no cyanosis. Neuro: A&Ox3, speech clear, gait stable. Skin: Skin color is normal. No rashes or lesions. No diaphoresis. Vascular: 2+ pulses symmetric in all extremities        Data Review:       Cardiographics:    EKG: Ectopic atrial rhythm, minor non-specific inferior ST-T wave changes    Cardiology Labs:    Results for orders placed or performed during the hospital encounter of 03/31/13   EKG, 12 LEAD, INITIAL   Result Value Ref Range    Ventricular Rate 78 BPM    Atrial Rate 78 BPM    P-R Interval 114 ms    QRS Duration 92 ms    Q-T Interval 374 ms    QTC Calculation (Bezet) 426 ms    Calculated R Axis 62 degrees    Calculated T Axis 34 degrees    Diagnosis       Normal sinus rhythm  When compared with ECG of 19-APR-2010 10:27,  Sinus rhythm has replaced Junctional rhythm  Confirmed by Camden Kelley ([id number]) on 4/1/2013 1:05:06 PM   Results for orders placed or performed in visit on 03/22/12   AMB POC EKG ROUTINE W/ 12 LEADS, INTER & REP    Impression    Pvcs, nsr       Lab Results   Component Value Date/Time    Cholesterol, total 215 (H) 03/26/2019 11:52 AM    HDL Cholesterol 111 03/26/2019 11:52 AM    LDL, calculated 90 03/26/2019 11:52 AM    Triglyceride 72 03/26/2019 11:52 AM    CHOL/HDL Ratio 2.5 01/30/2009 03:11 PM       Lab Results   Component Value Date/Time    Sodium 141 03/26/2019 11:52 AM    Potassium 4.1 03/26/2019 11:52 AM    Chloride 101 03/26/2019 11:52 AM    CO2 22 03/26/2019 11:52 AM    Anion gap 11 04/10/2013 05:30 AM    Glucose 75 03/26/2019 11:52 AM    BUN 20 03/26/2019 11:52 AM    Creatinine 1.07 (H) 03/26/2019 11:52 AM    BUN/Creatinine ratio 19 03/26/2019 11:52 AM    GFR est AA 56 (L) 03/26/2019 11:52 AM    GFR est non-AA 48 (L) 03/26/2019 11:52 AM    Calcium 9.8 03/26/2019 11:52 AM    Bilirubin, total 0.2 03/26/2019 11:52 AM    AST (SGOT) 26 03/26/2019 11:52 AM    Alk.  phosphatase 77 03/26/2019 11:52 AM    Protein, total 7.6 03/26/2019 11:52 AM    Albumin 4.2 03/26/2019 11:52 AM    Globulin 4.0 04/01/2013 02:35 AM    A-G Ratio 1.2 03/26/2019 11:52 AM    ALT (SGPT) 20 03/26/2019 11:52 AM          Assessment:       ICD-10-CM ICD-9-CM    1. Essential hypertension I10 401.9    2. Hypercholesteremia E78.00 272.0    3. Gait instability--uses roller walker R26.81 781.2    4. Gastroesophageal reflux disease with esophagitis K21.0 530.11    5. Vertigo R42 780.4          Discussion: Patient presents at this time stable from a cardiac perspective. SBP usually < 150 mmHg at home. If higher at home(ambulation limited,using walker),then call. Reluctant to add or increase additional meds at this time. No dizziness now on Meclizine per PCP. Pleased with present cardiac status. Plan: 1. Continue same meds. Lipid profile and labs followed by PCP-- and LDL 97. 2.Encouraged to exercise to tolerance, lose weight and follow low fat, low cholesterol, low sodium predominantly Plant-based (consider Mediterranean) diet. Call with questions or concerns. Will follow up any test results by phone and/or f/u here in office if needed. Khris Chowdary 3.Follow up: 6 MONTHS    I have discussed the diagnosis with the patient and the intended plan as seen in the above orders. The patient has received an after-visit summary and questions were answered concerning future plans. I have discussed any concerning medication side effects and warnings with the patient as well.     Heather Conte MD,[facility]  8/21/2019

## 2019-09-12 RX ORDER — OMEPRAZOLE 40 MG/1
CAPSULE, DELAYED RELEASE ORAL
Qty: 90 CAP | Refills: 12 | Status: SHIPPED | OUTPATIENT
Start: 2019-09-12 | End: 2021-01-02

## 2019-09-12 NOTE — TELEPHONE ENCOUNTER
Miky sent prescription refill request for patient's Prilosec 40mg capsule.      Last visit:3/26/19  Next visit:9/26/19  Previous refill 7/11/18(90+12R)    Requested Prescriptions     Pending Prescriptions Disp Refills    omeprazole (PRILOSEC) 40 mg capsule 90 Cap 12     Sig: take 1 capsule by mouth once daily FOR INDIGESTION

## 2019-09-26 ENCOUNTER — OFFICE VISIT (OUTPATIENT)
Dept: FAMILY MEDICINE CLINIC | Age: 84
End: 2019-09-26

## 2019-09-26 VITALS
HEART RATE: 65 BPM | DIASTOLIC BLOOD PRESSURE: 65 MMHG | TEMPERATURE: 98.3 F | SYSTOLIC BLOOD PRESSURE: 176 MMHG | RESPIRATION RATE: 14 BRPM | BODY MASS INDEX: 20.93 KG/M2 | OXYGEN SATURATION: 97 % | WEIGHT: 106.6 LBS | HEIGHT: 60 IN

## 2019-09-26 DIAGNOSIS — E78.00 HYPERCHOLESTEROLEMIA: ICD-10-CM

## 2019-09-26 DIAGNOSIS — Z23 ENCOUNTER FOR IMMUNIZATION: Primary | ICD-10-CM

## 2019-09-26 DIAGNOSIS — I10 ESSENTIAL HYPERTENSION: ICD-10-CM

## 2019-09-26 RX ORDER — ACETAMINOPHEN AND CODEINE PHOSPHATE 300; 30 MG/1; MG/1
TABLET ORAL
Refills: 0 | COMMUNITY
Start: 2019-08-30 | End: 2021-03-29 | Stop reason: ALTCHOICE

## 2019-09-26 RX ORDER — CLONIDINE HYDROCHLORIDE 0.2 MG/1
0.2 TABLET ORAL 2 TIMES DAILY
Qty: 180 TAB | Refills: 3 | Status: SHIPPED | OUTPATIENT
Start: 2019-09-26 | End: 2020-12-27

## 2019-09-26 NOTE — PROGRESS NOTES
HISTORY OF PRESENT ILLNESS  Burnette P Ceil Siemens is a 81 y.o. female. HPI Pt. Comes in for blood pressure check. Brought in by sister Rodolfo Luther. Some dizziness in the pm, usually relieved by sitting down. Needs cholesterol checked also. ROS    Physical Exam   Constitutional: She appears well-developed and well-nourished. HENT:   Right Ear: External ear normal.   Left Ear: External ear normal.   Mouth/Throat: Oropharynx is clear and moist.   Neck: No thyromegaly present. Cardiovascular: Normal rate, regular rhythm, normal heart sounds and intact distal pulses. Pulmonary/Chest: Breath sounds normal. She has no wheezes. Br- no mass   Abdominal: Soft. Bowel sounds are normal. She exhibits no distension and no mass. There is no tenderness. Musculoskeletal: She exhibits no edema. Lymphadenopathy:     She has no cervical adenopathy. Nursing note and vitals reviewed. ASSESSMENT and PLAN  Orders Placed This Encounter    Influenza Vaccine Inactivated (IIV) (FLUAD), Subunit, Adjuvanted, IM (35703)    METABOLIC PANEL, COMPREHENSIVE    LIPID PANEL    MT IMMUNIZ ADMIN,1 SINGLE/COMB VAC/TOXOID    cloNIDine HCl (CATAPRES) 0.2 mg tablet     Diagnoses and all orders for this visit:    1. Encounter for immunization  -     INFLUENZA VACCINE INACTIVATED (IIV), SUBUNIT, ADJUVANTED, IM  -     MT IMMUNIZ ADMIN,1 SINGLE/COMB VAC/TOXOID    2. Essential hypertension  -     METABOLIC PANEL, COMPREHENSIVE    3. Hypercholesterolemia  -     LIPID PANEL    Other orders  -     cloNIDine HCl (CATAPRES) 0.2 mg tablet; Take 1 Tab by mouth two (2) times a day. For blood pressure      Follow-up and Dispositions    · Return in about 6 months (around 3/26/2020).

## 2019-09-26 NOTE — PROGRESS NOTES
Chief Complaint   Patient presents with    Hypertension    Dizziness     1. Have you been to the ER, urgent care clinic since your last visit? Hospitalized since your last visit? No    2. Have you seen or consulted any other health care providers outside of the [address] [place] [address] since your last visit? Include any pap smears or colon screening. No     Health Maintenance Due   Topic Date Due    Shingrix Vaccine Age 49> (1 of 2) 05/13/1986    GLAUCOMA SCREENING Q2Y  08/01/2016    Influenza Age 5 to Adult  08/01/2019    Pneumococcal 65+ years (2 of 2 - PPSV23) 09/20/2019         After obtaining consent, and per orders of Dr. Lenora Schaefer, injection of Flu shot High dose ( right deltoid) given by Danford Hatchet, LPN. Patient instructed to remain in clinic for 20 minutes afterwards, and to report any adverse reaction to me immediately.   Pt did not complain about any negative side effects

## 2019-09-26 NOTE — PATIENT INSTRUCTIONS
Vaccine Information Statement Influenza (Flu) Vaccine (Inactivated or Recombinant): What You Need to Know Many Vaccine Information Statements are available in Occitan and other languages. See www.immunize.org/vis Hojas de información sobre vacunas están disponibles en español y en muchos otros idiomas. Visite www.immunize.org/vis 1. Why get vaccinated? Influenza vaccine can prevent influenza (flu). Flu is a contagious disease that spreads around the [facility] every year, usually between October and May. Anyone can get the flu, but it is more dangerous for some people. Infants and young children, people 72years of age and older, pregnant women, and people with certain health conditions or a weakened immune system are at greatest risk of flu complications. Pneumonia, bronchitis, sinus infections and ear infections are examples of flu-related complications. If you have a medical condition, such as heart disease, cancer or diabetes, flu can make it worse. Flu can cause fever and chills, sore throat, muscle aches, fatigue, cough, headache, and runny or stuffy nose. Some people may have vomiting and diarrhea, though this is more common in children than adults. Each year thousands of people in the [facility] die from flu, and many more are hospitalized. Flu vaccine prevents millions of illnesses and flu-related visits to the doctor each year. 2. Influenza vaccines CDC recommends everyone 10months of age and older get vaccinated every flu season. Children 6 months through 6years of age may need 2 doses during a single flu season. Everyone else needs only 1 dose each flu season. It takes about 2 weeks for protection to develop after vaccination. There are many flu viruses, and they are always changing. Each year a new flu vaccine is made to protect against three or four viruses that are likely to cause disease in the upcoming flu season.  Even when the vaccine doesnt exactly match these viruses, it may still provide some protection. Influenza vaccine does not cause flu. Influenza vaccine may be given at the same time as other vaccines. 3. Talk with your health care provider Tell your vaccine provider if the person getting the vaccine: 
 Has had an allergic reaction after a previous dose of influenza vaccine, or has any severe, life-threatening allergies.  Has ever had Guillain-Barré Syndrome (also called GBS). In some cases, your health care provider may decide to postpone influenza vaccination to a future visit. People with minor illnesses, such as a cold, may be vaccinated. People who are moderately or severely ill should usually wait until they recover before getting influenza vaccine. Your health care provider can give you more information. 4. Risks of a reaction  Soreness, redness, and swelling where shot is given, fever, muscle aches, and headache can happen after influenza vaccine.  There may be a very small increased risk of Guillain-Barré Syndrome (GBS) after inactivated influenza vaccine (the flu shot). Latisha Rodriguez children who get the flu shot along with pneumococcal vaccine (PCV13), and/or DTaP vaccine at the same time might be slightly more likely to have a seizure caused by fever. Tell your health care provider if a child who is getting flu vaccine has ever had a seizure. People sometimes faint after medical procedures, including vaccination. Tell your provider if you feel dizzy or have vision changes or ringing in the ears. As with any medicine, there is a very remote chance of a vaccine causing a severe allergic reaction, other serious injury, or death. 5. What if there is a serious problem? An allergic reaction could occur after the vaccinated person leaves the clinic.  If you see signs of a severe allergic reaction (hives, swelling of the face and throat, difficulty breathing, a fast heartbeat, dizziness, or weakness), call 9-1-1 and get the person to the nearest hospital. 
 
For other signs that concern you, call your health care provider. Adverse reactions should be reported to the Vaccine Adverse Event Reporting System (VAERS). Your health care provider will usually file this report, or you can do it yourself. Visit the VAERS website at www.vaers. hhs.gov or call 3-422.293.3615. VAERS is only for reporting reactions, and VAERS staff do not give medical advice. 6. The National Vaccine Injury Compensation Program 
 
The [facility] Vaccine Injury Compensation Program (VICP) is a federal program that was created to compensate people who may have been injured by certain vaccines. Visit the VICP website at www.hrsa.gov/vaccinecompensation or call 3-167.149.2489 to learn about the program and about filing a claim. There is a time limit to file a claim for compensation. 7. How can I learn more?  Ask your health care provider.  Call your local or state health department.  Contact the Centers for Disease Control and Prevention (CDC): 
- Call 4-162.483.3041 (1-800-CDC-INFO) or 
- Visit CDCs influenza website at www.cdc.gov/flu Vaccine Information Statement (Interim) Inactivated Influenza Vaccine 8/15/2019 
[address] [employer] [id number] Department of [facility] and [facility] Centers for Disease Control and Prevention Office Use Only

## 2019-09-27 LAB
ALBUMIN SERPL-MCNC: 4.4 G/DL (ref 3.5–4.7)
ALBUMIN/GLOB SERPL: 1.5 {RATIO} (ref 1.2–2.2)
ALP SERPL-CCNC: 71 IU/L (ref 39–117)
ALT SERPL-CCNC: 17 IU/L (ref 0–32)
AST SERPL-CCNC: 19 IU/L (ref 0–40)
BILIRUB SERPL-MCNC: 0.2 MG/DL (ref 0–1.2)
BUN SERPL-MCNC: 18 MG/DL (ref 8–27)
BUN/CREAT SERPL: 20 (ref 12–28)
CALCIUM SERPL-MCNC: 9.8 MG/DL (ref 8.7–10.3)
CHLORIDE SERPL-SCNC: 101 MMOL/L (ref 96–106)
CHOLEST SERPL-MCNC: 235 MG/DL (ref 100–199)
CO2 SERPL-SCNC: 20 MMOL/L (ref 20–29)
CREAT SERPL-MCNC: 0.91 MG/DL (ref 0.57–1)
GLOBULIN SER CALC-MCNC: 2.9 G/DL (ref 1.5–4.5)
GLUCOSE SERPL-MCNC: 63 MG/DL (ref 65–99)
HDLC SERPL-MCNC: 132 MG/DL
INTERPRETATION, 910389: NORMAL
INTERPRETATION: NORMAL
LDLC SERPL CALC-MCNC: 91 MG/DL (ref 0–99)
PDF IMAGE, 910387: NORMAL
POTASSIUM SERPL-SCNC: 4.3 MMOL/L (ref 3.5–5.2)
PROT SERPL-MCNC: 7.3 G/DL (ref 6–8.5)
SODIUM SERPL-SCNC: 143 MMOL/L (ref 134–144)
TRIGL SERPL-MCNC: 62 MG/DL (ref 0–149)
VLDLC SERPL CALC-MCNC: 12 MG/DL (ref 5–40)

## 2020-01-08 ENCOUNTER — OFFICE VISIT (OUTPATIENT)
Dept: FAMILY MEDICINE CLINIC | Age: 85
End: 2020-01-08

## 2020-01-08 VITALS
HEART RATE: 60 BPM | WEIGHT: 105 LBS | OXYGEN SATURATION: 100 % | TEMPERATURE: 96.4 F | DIASTOLIC BLOOD PRESSURE: 59 MMHG | SYSTOLIC BLOOD PRESSURE: 135 MMHG | HEIGHT: 60 IN | BODY MASS INDEX: 20.62 KG/M2 | RESPIRATION RATE: 18 BRPM

## 2020-01-08 DIAGNOSIS — E03.9 ACQUIRED HYPOTHYROIDISM: Primary | ICD-10-CM

## 2020-01-08 RX ORDER — LEVOTHYROXINE SODIUM 50 UG/1
TABLET ORAL
Qty: 90 TAB | Refills: 12 | Status: SHIPPED | OUTPATIENT
Start: 2020-01-08 | End: 2021-03-29 | Stop reason: SDUPTHER

## 2020-01-08 RX ORDER — ALENDRONATE SODIUM 70 MG/1
TABLET ORAL
Qty: 13 TAB | Refills: 3 | Status: SHIPPED | OUTPATIENT
Start: 2020-01-08 | End: 2021-03-18 | Stop reason: SDUPTHER

## 2020-01-08 NOTE — PROGRESS NOTES
Chief Complaint   Patient presents with    Follow-up     Hypertension       1. Have you been to the ER, urgent care clinic since your last visit? Hospitalized since your last visit? No    2. Have you seen or consulted any other health care providers outside of the [address] [place] [address] since your last visit? Include any pap smears or colon screening.    Yes  Cataract  9/2019  Dr. Anupam Ramirez

## 2020-01-08 NOTE — PROGRESS NOTES
HISTORY OF PRESENT ILLNESS  Alex Sims is a 81 y.o. female. HPI Pt. Comes in for blood pressure check. Needs cholesterol and thyroid checked. Has had cataracts removed from eye. ROS    Physical Exam  Vitals signs and nursing note reviewed. Constitutional:       Appearance: She is well-developed. HENT:      Right Ear: External ear normal.      Left Ear: External ear normal.   Neck:      Thyroid: No thyromegaly. Cardiovascular:      Rate and Rhythm: Normal rate and regular rhythm. Heart sounds: Normal heart sounds. Pulmonary:      Breath sounds: Normal breath sounds. No wheezing. Abdominal:      General: Bowel sounds are normal. There is no distension. Palpations: Abdomen is soft. There is no mass. Tenderness: There is no tenderness. Lymphadenopathy:      Cervical: No cervical adenopathy. ASSESSMENT and PLAN  Orders Placed This Encounter    TSH 3RD GENERATION    T4,FREE(DIRECT)    alendronate (FOSAMAX) 70 mg tablet    levothyroxine (SYNTHROID) 50 mcg tablet     Diagnoses and all orders for this visit:    1. Acquired hypothyroidism  -     TSH 3RD GENERATION  -     T4,FREE(DIRECT)    Other orders  -     alendronate (FOSAMAX) 70 mg tablet; take 1 tablet by mouth every week on an empty stomach with 8 ounces of water. For bones  -     levothyroxine (SYNTHROID) 50 mcg tablet; take 1 tablet by mouth once daily      Follow-up and Dispositions    · Return in about 6 months (around 7/8/2020).

## 2020-01-09 LAB
T4 FREE SERPL-MCNC: 1.25 NG/DL (ref 0.82–1.77)
TSH SERPL DL<=0.005 MIU/L-ACNC: 0.29 UIU/ML (ref 0.45–4.5)

## 2020-02-24 NOTE — PROGRESS NOTES
[facility] @ [address] [address], [place]  Subjective/HPI: HR 2/24/2020    Alex Manrique is a 81 y.o. female is here for routine f/u. The patient denies chest pain/ shortness of breath, orthopnea, PND, LE edema, palpitations, syncope, presyncope. Patient reports she has been having some intermittent dizziness that can occur with activity or while sitting. She denies headache visual change slurred speech or any further difficulty walking. She is accompanied by a family friend today. Compliant with taking medications. Denies any dyspnea on exertion chest pain or significant fatigue. History of grade 2 diastolic dysfunction denies orthopnea or paroxysmal nocturnal dyspnea. 2016 echo SUMMARY:  Left ventricle: Size was normal. Systolic function was hyperdynamic. Ejection fraction was estimated in the range of 70 % to 75 %. There were  no regional wall motion abnormalities. Wall thickness was normal. Features  were consistent with a pseudonormal left ventricular filling pattern, with  concomitant abnormal relaxation and increased filling pressure (grade 2  diastolic dysfunction). Doppler parameters were consistent with elevated  mean left atrial filling pressure.     Left atrium: The atrium was mildly to moderately dilated.     Mitral valve: There was trivial regurgitation.     Tricuspid valve: There was mild regurgitation. Pulmonary artery systolic  pressure: 34 mmHg.  There was insignificant pulmonary hypertension    PCP Provider  Nallely Byrd MD  Past Medical History:   Diagnosis Date    GERD (gastroesophageal reflux disease) 2007    EGD- reflux esophagitis    Hypercholesterolemia     Hypertension     Osteopenia 11/15/2011    Pulmonary embolus (HCC)     hx. of PE after surgery    Thyroid disease       Past Surgical History:   Procedure Laterality Date    COLONOSCOPY      armando-internal hemorrhoids    HX GI      bowel obstruction 12-    HX HERNIA REPAIR  5/11/11    HX HYSTERECTOMY      HX ORTHOPAEDIC  2011    toe sx-bilateral    HX SMALL BOWEL RESECTION      4-18-10    NC FREEING BOWEL ADHESION,ENTEROLYSIS      REPAIR ING HERNIA,5+Y/O,REDUCIBL  5/11/11     Allergies   Allergen Reactions    Hydrochlorothiazide Other (comments)     dizziness      History reviewed. No pertinent family history. Current Outpatient Medications   Medication Sig    alendronate (FOSAMAX) 70 mg tablet take 1 tablet by mouth every week on an empty stomach with 8 ounces of water. For bones    levothyroxine (SYNTHROID) 50 mcg tablet take 1 tablet by mouth once daily    acetaminophen-codeine (TYLENOL #3) 300-30 mg per tablet TK 1 T PO Q 6 H PRN    cloNIDine HCl (CATAPRES) 0.2 mg tablet Take 1 Tab by mouth two (2) times a day. For blood pressure    omeprazole (PRILOSEC) 40 mg capsule take 1 capsule by mouth once daily FOR INDIGESTION    verapamil ER (CALAN-SR) 240 mg CR tablet take 1 tablet by mouth once daily for blood pressure    indapamide (LOZOL) 1.25 mg tablet take 1 tablet by mouth once daily for blood pressure    atorvastatin (LIPITOR) 20 mg tablet take 1 tablet by mouth daily for cholesterol    meclizine (ANTIVERT) 25 mg tablet One tab every 8 hours as needed for dizziness    DOCUSATE SODIUM (COLACE PO) Take  by mouth daily.  SORE THROAT, BENZOCAINE-MENTH, 15-2.6 mg lozg lozenge as needed.  HI-CINTHIA PLUS VIT D 500 mg(1,250mg) -200 unit per tablet take 1 tablet by mouth twice a day    multivitamins-ca-iron-minerals (ONE DAILY) Tab Take one tablet by mouth once daily. No current facility-administered medications for this visit.        Vitals:    02/26/20 0957 02/26/20 0958 02/26/20 0959 02/26/20 1000   BP: 138/72 132/76 124/70 126/76   Pulse:       Resp:       SpO2:       Weight:       Height:         Social History     Socioeconomic History    Marital status:      Spouse name: Not on file    Number of children: Not on file  Years of education: Not on file    Highest education level: Not on file   Occupational History    Not on file   Social Needs    Financial resource strain: Not on file    Food insecurity:     Worry: Not on file     Inability: Not on file    Transportation needs:     Medical: Not on file     Non-medical: Not on file   Tobacco Use    Smoking status: Never Smoker    Smokeless tobacco: Never Used   Substance and Sexual Activity    Alcohol use: No    Drug use: No    Sexual activity: Not on file   Lifestyle    Physical activity:     Days per week: Not on file     Minutes per session: Not on file    Stress: Not on file   Relationships    Social connections:     Talks on phone: Not on file     Gets together: Not on file     Attends Anabaptist service: Not on file     Active member of club or organization: Not on file     Attends meetings of clubs or organizations: Not on file     Relationship status: Not on file    Intimate partner violence:     Fear of current or ex partner: Not on file     Emotionally abused: Not on file     Physically abused: Not on file     Forced sexual activity: Not on file   Other Topics Concern    Not on file   Social History Narrative    Currently lives by herself, brought in by sister. Sister lives 5 minutes away. I have reviewed the nurses notes, vitals, problem list, allergy list, medical history, family, social history and medications. Review of Symptoms:  11 systems reviewed and are negative unless stated in the HPI       Physical Exam:      General: Thin, kyphoscoliotic, in no acute distress, cooperative and alert  HEENT: No carotid bruits, no JVD, trach is midline. Neck Supple, the right ear canal has complete cerumen obstruction, left ear canal partial cerumen obstruction visual portion of tympanic membrane is normal   Heart:  Normal S1/S2 negative S3 or S4. Regular, no murmur, gallop or rub.    Respiratory: Clear bilaterally x 4, no wheezing or rales  Abdomen: Soft, non-tender, no masses, bowel sounds are active. Extremities:  No edema, normal cap refill, no cyanosis, atraumatic. Neuro: A&Ox3, speech clear, gait slow cautious using rolling walker  Skin: Skin color is normal. No rashes or lesions. Non diaphoretic  Vascular: 2+ pulses symmetric in all extremities    Cardiographics    ECG: Sinus bradycardia         Cardiology Labs:  Lab Results   Component Value Date/Time    Cholesterol, total 235 (H) 09/26/2019 12:53 PM    HDL Cholesterol 132 09/26/2019 12:53 PM    LDL, calculated 91 09/26/2019 12:53 PM    Triglyceride 62 09/26/2019 12:53 PM    CHOL/HDL Ratio 2.5 01/30/2009 03:11 PM       Lab Results   Component Value Date/Time    Sodium 143 09/26/2019 12:53 PM    Potassium 4.3 09/26/2019 12:53 PM    Chloride 101 09/26/2019 12:53 PM    CO2 20 09/26/2019 12:53 PM    Anion gap 11 04/10/2013 05:30 AM    Glucose 63 (L) 09/26/2019 12:53 PM    BUN 18 09/26/2019 12:53 PM    Creatinine 0.91 09/26/2019 12:53 PM    BUN/Creatinine ratio 20 09/26/2019 12:53 PM    GFR est AA 67 09/26/2019 12:53 PM    GFR est non-AA 59 (L) 09/26/2019 12:53 PM    Calcium 9.8 09/26/2019 12:53 PM    Bilirubin, total 0.2 09/26/2019 12:53 PM    AST (SGOT) 19 09/26/2019 12:53 PM    Alk. phosphatase 71 09/26/2019 12:53 PM    Protein, total 7.3 09/26/2019 12:53 PM    Albumin 4.4 09/26/2019 12:53 PM    Globulin 4.0 04/01/2013 02:35 AM    A-G Ratio 1.5 09/26/2019 12:53 PM    ALT (SGPT) 17 09/26/2019 12:53 PM        No results found for: CPK, CK, CKMMB, CKMB, RCK3, CKMBT, CKMBPOC, CKNDX, CKND1, KETTY, TROPT, TROIQ, JAVED, TROPT, TNIPOC, BNP, BNPP, BNPNT    No results found for: HBA1C, HGBE8, SBL6EKQC, KXI4SUPM   Assessment:     Assessment:     Diagnoses and all orders for this visit:    1. Essential hypertension  -     AMB POC EKG ROUTINE W/ 12 LEADS, INTER & REP  -     ECHO ADULT COMPLETE; Future  -     DUPLEX CAROTID BILATERAL; Future  -     CARDIAC HOLTER MONITOR; Future    2.  Hypercholesteremia  -     AMB POC EKG ROUTINE W/ 12 LEADS, INTER & REP  -     ECHO ADULT COMPLETE; Future  -     DUPLEX CAROTID BILATERAL; Future  -     CARDIAC HOLTER MONITOR; Future    3. Gait instability--uses roller walker  -     ECHO ADULT COMPLETE; Future  -     DUPLEX CAROTID BILATERAL; Future  -     CARDIAC HOLTER MONITOR; Future    4. Vertigo  -     ECHO ADULT COMPLETE; Future  -     DUPLEX CAROTID BILATERAL; Future  -     CARDIAC HOLTER MONITOR; Future    5. Dizziness        ICD-10-CM ICD-9-CM    1. Essential hypertension I10 401.9 AMB POC EKG ROUTINE W/ 12 LEADS, INTER & REP      ECHO ADULT COMPLETE      DUPLEX CAROTID BILATERAL      CARDIAC HOLTER MONITOR   2. Hypercholesteremia E78.00 272.0 AMB POC EKG ROUTINE W/ 12 LEADS, INTER & REP      ECHO ADULT COMPLETE      DUPLEX CAROTID BILATERAL      CARDIAC HOLTER MONITOR   3. Gait instability--uses roller walker R26.81 781.2 ECHO ADULT COMPLETE      DUPLEX CAROTID BILATERAL      CARDIAC HOLTER MONITOR   4. Vertigo R42 780.4 ECHO ADULT COMPLETE      DUPLEX CAROTID BILATERAL      CARDIAC HOLTER MONITOR   5. Dizziness R42 780.4      Orders Placed This Encounter    AMB POC EKG ROUTINE W/ 12 LEADS, INTER & REP     Order Specific Question:   Reason for Exam:     Answer:   routine    DUPLEX CAROTID BILATERAL     Standing Status:   Future     Standing Expiration Date:   8/26/2020        Plan:     1. Hypertension: Controlled continue current therapy the exception of changing verapamil   2. Hyperlipidemia: LDL 91 September 2019 continue current therapy  3. Intermittent dizziness: Multifactorial (age, history of vertigo, antihypertensive therapy, inadequate hydration) , not orthostatic on exam today, she is bradycardic heart rate 48 on EKG. Will reduce verapamil from 240 mg daily to 120 mg daily. Will check carotid duplex echo and 5-day Holter monitor. Continue current medications reinforced proper hydration.   Follow-up in 1 month, consider changing verapamil to amlodipine if she remains salvador. Craven Cogan, BIN      Please note that this dictation was completed with IQ Elite, the computer voice recognition software. Quite often unanticipated grammatical, syntax, homophones, and other interpretive errors are inadvertently transcribed by the computer software. Please disregard these errors. Please excuse any errors that have escaped final proofreading. Thank you.

## 2020-02-26 ENCOUNTER — OFFICE VISIT (OUTPATIENT)
Dept: CARDIOLOGY CLINIC | Age: 85
End: 2020-02-26

## 2020-02-26 VITALS
BODY MASS INDEX: 20.81 KG/M2 | OXYGEN SATURATION: 100 % | HEIGHT: 60 IN | WEIGHT: 106 LBS | SYSTOLIC BLOOD PRESSURE: 126 MMHG | HEART RATE: 55 BPM | DIASTOLIC BLOOD PRESSURE: 76 MMHG | RESPIRATION RATE: 16 BRPM

## 2020-02-26 DIAGNOSIS — E78.00 HYPERCHOLESTEREMIA: ICD-10-CM

## 2020-02-26 DIAGNOSIS — R26.81 GAIT INSTABILITY: ICD-10-CM

## 2020-02-26 DIAGNOSIS — R42 VERTIGO: ICD-10-CM

## 2020-02-26 DIAGNOSIS — I10 ESSENTIAL HYPERTENSION: Primary | ICD-10-CM

## 2020-02-26 DIAGNOSIS — R42 DIZZINESS: ICD-10-CM

## 2020-02-26 RX ORDER — VERAPAMIL HYDROCHLORIDE 120 MG/1
120 TABLET, FILM COATED, EXTENDED RELEASE ORAL
Qty: 30 TAB | Refills: 2 | Status: SHIPPED | OUTPATIENT
Start: 2020-02-26 | End: 2020-05-26

## 2020-02-26 NOTE — PROGRESS NOTES
Chief Complaint   Patient presents with    Cholesterol Problem     6 month follow up     1. Have you been to the ER, urgent care clinic since your last visit? Hospitalized since your last visit? No    2. Have you seen or consulted any other health care providers outside of the [address] [place] [address] since your last visit? Include any pap smears or colon screening.  No

## 2020-03-04 ENCOUNTER — HOSPITAL ENCOUNTER (OUTPATIENT)
Dept: NON INVASIVE DIAGNOSTICS | Age: 85
Discharge: HOME | End: 2020-03-04
Payer: MEDICARE

## 2020-03-04 ENCOUNTER — HOSPITAL ENCOUNTER (OUTPATIENT)
Dept: VASCULAR SURGERY | Age: 85
Discharge: HOME | End: 2020-03-04
Payer: MEDICARE

## 2020-03-04 DIAGNOSIS — R26.81 GAIT INSTABILITY: ICD-10-CM

## 2020-03-04 DIAGNOSIS — R42 VERTIGO: ICD-10-CM

## 2020-03-04 DIAGNOSIS — E78.00 HYPERCHOLESTEREMIA: ICD-10-CM

## 2020-03-04 DIAGNOSIS — I10 ESSENTIAL HYPERTENSION: ICD-10-CM

## 2020-03-04 LAB
AV VELOCITY RATIO: 0.87
ECHO AO ASC DIAM: 1.47 CM
ECHO AO ROOT DIAM: 2.25 CM
ECHO AV AREA PEAK VELOCITY: 1.8 CM2
ECHO AV AREA/BSA PEAK VELOCITY: 1.3 CM2/M2
ECHO AV PEAK GRADIENT: 5.1 MMHG
ECHO AV PEAK VELOCITY: 112.86 CM/S
ECHO EST RA PRESSURE: 10 MMHG
ECHO LA AREA 4C: 9.3 CM2
ECHO LA MAJOR AXIS: 2.35 CM
ECHO LA TO AORTIC ROOT RATIO: 1.04
ECHO LA VOL 4C: 20.96 ML (ref 22–52)
ECHO LV EDV TEICHHOLZ: 0.28 ML
ECHO LV ESV TEICHHOLZ: 0.21 ML
ECHO LV INTERNAL DIMENSION DIASTOLIC: 3.25 CM (ref 3.9–5.3)
ECHO LV INTERNAL DIMENSION SYSTOLIC: 2.88 CM
ECHO LV IVSD: 1.84 CM (ref 0.6–0.9)
ECHO LV MASS 2D: 159.2 G (ref 67–162)
ECHO LV POSTERIOR WALL DIASTOLIC: 0.76 CM (ref 0.6–0.9)
ECHO LVOT DIAM: 1.65 CM
ECHO LVOT PEAK GRADIENT: 3.8 MMHG
ECHO LVOT PEAK VELOCITY: 98.01 CM/S
ECHO LVOT SV: 55.4 ML
ECHO LVOT VTI: 26.04 CM
ECHO MV A VELOCITY: 43.39 CM/S
ECHO MV E DECELERATION TIME (DT): 284.4 MS
ECHO MV E VELOCITY: 35.41 CM/S
ECHO MV E/A RATIO: 0.82
ECHO MV REGURGITANT PEAK GRADIENT: 4.8 MMHG
ECHO MV REGURGITANT PEAK VELOCITY: 109.41 CM/S
ECHO PULMONARY ARTERY SYSTOLIC PRESSURE (PASP): 40.3 MMHG
ECHO RA AREA 4C: 9.15 CM2
ECHO RIGHT VENTRICULAR SYSTOLIC PRESSURE (RVSP): 40.3 MMHG
ECHO TV REGURGITANT MAX VELOCITY: 275.06 CM/S
ECHO TV REGURGITANT PEAK GRADIENT: 30.3 MMHG
LVFS 2D: 11.47 %
LVOT MG: 1.43 MMHG
LVOT MV: 0.54 CM/S
LVSV (TEICH): 7.68 ML
MV DEC SLOPE: 2.28

## 2020-03-04 PROCEDURE — 93306 TTE W/DOPPLER COMPLETE: CPT

## 2020-03-04 PROCEDURE — 93225 XTRNL ECG REC<48 HRS REC: CPT

## 2020-03-04 PROCEDURE — 93880 EXTRACRANIAL BILAT STUDY: CPT

## 2020-03-04 NOTE — PROGRESS NOTES
Monitor explained to t he pt and her sister,satisfactory  verbalization from both as to process. Monitor number [id number] placed with instruction, can shower but not submerged in water, remove on Monday and return on Monday March 9th . Sister will return device, noting to record symptoms in diary and to call 911 if she has any life threatenig issues.

## 2020-03-05 LAB
LEFT CCA DIST DIAS: 20.7 CM/S
LEFT CCA DIST SYS: 92.9 CM/S
LEFT CCA PROX DIAS: 16.1 CM/S
LEFT CCA PROX SYS: 99.9 CM/S
LEFT ECA DIAS: 0 CM/S
LEFT ECA SYS: 71.9 CM/S
LEFT ICA DIST DIAS: 34.7 CM/S
LEFT ICA DIST SYS: 146.4 CM/S
LEFT ICA MID DIAS: 30 CM/S
LEFT ICA MID SYS: 139.4 CM/S
LEFT ICA PROX DIAS: 25.4 CM/S
LEFT ICA PROX SYS: 109.2 CM/S
LEFT ICA/CCA SYS: 1.58
LEFT SUBCLAVIAN DIAS: 0 CM/S
LEFT SUBCLAVIAN SYS: 172.5 CM/S
RIGHT CCA DIST DIAS: 12.8 CM/S
RIGHT CCA DIST SYS: 74.3 CM/S
RIGHT CCA PROX DIAS: 16 CM/S
RIGHT CCA PROX SYS: 93.8 CM/S
RIGHT ECA DIAS: 0 CM/S
RIGHT ECA SYS: 61.4 CM/S
RIGHT ICA DIST DIAS: 14.5 CM/S
RIGHT ICA DIST SYS: 76.4 CM/S
RIGHT ICA MID DIAS: 19.3 CM/S
RIGHT ICA MID SYS: 97 CM/S
RIGHT ICA PROX DIAS: 12.8 CM/S
RIGHT ICA PROX SYS: 67.9 CM/S
RIGHT ICA/CCA SYS: 1.3
RIGHT SUBCLAVIAN DIAS: 0 CM/S
RIGHT SUBCLAVIAN SYS: 132.4 CM/S
RIGHT VERTEBRAL DIAS: 17.65 CM/S
RIGHT VERTEBRAL SYS: 66.2 CM/S

## 2020-03-05 NOTE — PROGRESS NOTES
Glenroy: Please call patient ultrasound of the heart looks good, normal pumping strength, small amount of leakage in her valves nothing unusual over the age of 73. Continue current medications.

## 2020-03-09 NOTE — PROGRESS NOTES
Spoke with patient and patient's sister  Verified patient with 2 patient identifiers  Informed per Chago Astorga NP carotid duplex looks OK, minimal plaque build up   Patient and sister verbalized understanding.

## 2020-03-11 NOTE — PROGRESS NOTES
Spoke with patient's sister Quinn Hannah(verified HIPPA)  Verified patient with 2 patient identifiers  Informed per Josee Brand NP ECHO shows that  heart looks good, normal pumping strength, small amount of leakage in her valves nothing unusual over the age of 73. Continue current medications. Tawanna verbalized understanding, will notify patient.

## 2020-03-12 NOTE — PROGRESS NOTES
Glenroy: Please call patient monitor shows her average heart rate was normal at 73 bpm.  Did slow down once to 45 bpm.  Continue current medications, follow-up as planned we will repeat EKG at follow-up and I will consider if needed changing diltiazem to amlodipine.

## 2020-03-12 NOTE — PROGRESS NOTES
Spoke with Erika Hannah(verified HIPPA)  Verified patient with 2 patient identifiers  Informed per Devin Toledo NP  monitor shows her average heart rate was normal at 73 bpm.  Did slow down once to 45 bpm.  Continue current medications, follow-up as planned. Ms Tayla Macdonald verbalized understanding, will call to schedule follow up appointment and inform patient.

## 2020-03-23 NOTE — TELEPHONE ENCOUNTER
Last visit:1/8/20  Next visit:7/8/20  Previous refill 2/26/19(90+3R)    Requested Prescriptions     Pending Prescriptions Disp Refills    atorvastatin (LIPITOR) 20 mg tablet 90 Tab 3     Sig: take 1 tablet by mouth daily for cholesterol

## 2020-03-24 RX ORDER — ATORVASTATIN CALCIUM 20 MG/1
TABLET, FILM COATED ORAL
Qty: 90 TAB | Refills: 3 | Status: SHIPPED | OUTPATIENT
Start: 2020-03-24 | End: 2021-03-29 | Stop reason: SDUPTHER

## 2020-04-14 ENCOUNTER — TELEPHONE (OUTPATIENT)
Dept: FAMILY MEDICINE CLINIC | Age: 85
End: 2020-04-14

## 2020-04-14 NOTE — TELEPHONE ENCOUNTER
Tawanna sister  calling to   report patient has swollen R  Leg x 3 days       Has tried tylenol and epsom salts & ice packs       Pls advise     Best number to reach her is [phone] or [facility] [place] at [phone]

## 2020-04-14 NOTE — TELEPHONE ENCOUNTER
Per patient patient has swelling in right foot and leg with fluid \"oozing\" on right foot. Per sister patient has an appointment with foot doctor tomorrow morning - Dr. Andi Acevedo. Sister to call back if not any better.

## 2020-04-22 ENCOUNTER — TELEPHONE (OUTPATIENT)
Dept: FAMILY MEDICINE CLINIC | Age: 85
End: 2020-04-22

## 2020-04-22 ENCOUNTER — VIRTUAL VISIT (OUTPATIENT)
Dept: FAMILY MEDICINE CLINIC | Age: 85
End: 2020-04-22

## 2020-04-22 DIAGNOSIS — L08.9 RIGHT FOOT INFECTION: ICD-10-CM

## 2020-04-22 DIAGNOSIS — R60.0 LOCALIZED EDEMA: Primary | ICD-10-CM

## 2020-04-22 RX ORDER — MUPIROCIN 20 MG/G
OINTMENT TOPICAL DAILY
Qty: 22 G | Refills: 1 | Status: SHIPPED | OUTPATIENT
Start: 2020-04-22 | End: 2021-09-29

## 2020-04-22 RX ORDER — FUROSEMIDE 40 MG/1
TABLET ORAL
Qty: 30 TAB | Refills: 3 | Status: SHIPPED | OUTPATIENT
Start: 2020-04-22 | End: 2021-09-29

## 2020-04-22 NOTE — PROGRESS NOTES
Chief Complaint   Patient presents with    Leg Pain     right leg pain and swelling. Onset one week ago. No redness. Pitting noted by granddaughter. Patient has open areas on inner ankle.

## 2020-04-22 NOTE — TELEPHONE ENCOUNTER
Patient still has swelling and per daughter has sores. Patient scheduled for virtual visit today at 2:30 p.m.

## 2020-04-22 NOTE — TELEPHONE ENCOUNTER
Pt sister calling about previous message:      Tawanna sister  calling to   report patient has swollen R  Leg x 3 days         Has tried tylenol and epsom salts & ice packs         Pls advise     States they did go to foot doctor and he recommended ER but she wants to speak to someone before she takes her in      Best number to reach her is [phone]

## 2020-04-22 NOTE — PROGRESS NOTES
HISTORY OF PRESENT ILLNESS  Alex Morales is a 81 y.o. female. HPI Has had bilateral swelling in legs for one week. No dyspnea, hx renal or liver disease. Also developed a sore on dorsum of R foot around base of 2nd and 3rd toes. Went to podiatry and was put on augmentin. Sister thinks that foot is looking somewhat better since then No feve, chills. No hx diabetes. Had a similar problem a few years ago, was sent to vascular surgery and was told that circulation was ok. Has been taking tylenol for pain, does fairly well. ROS    Physical Exam  Musculoskeletal:      Comments: 1+ edema of lower legs bilaterally  R foot- there is a darkened area over distal 2nd and 3rd metatarsal area on dorsum of foot. ASSESSMENT and PLAN  Orders Placed This Encounter    furosemide (LASIX) 40 mg tablet    mupirocin (BACTROBAN) 2 % ointment     Diagnoses and all orders for this visit:    1. Localized edema  -     furosemide (LASIX) 40 mg tablet; One tab po daily for swelling in feet. 2. Right foot infection  -     mupirocin (BACTROBAN) 2 % ointment; Apply  to affected area daily. To ER or pt first if infection on dorsum of foot worsens. Continue augmentin tid. Granddaughter says that has 8 more days left. Pt was evaluated by a video visit encounter for concerns as discussed above. A caregiver was present when appropriate. Due to this being a TeleHealth encounter ([facility] [address]) physical exam was limited. Pursuant to the emergency declaration under the [address] [place] [address], this Virtual Visit was conducted, with patients (and/or legal guardians's) consent, to reduce the patient's risk of exposure to COVID -19 and provide necessary medical care. Services were provided through a video synchronous discussion virtually to substitute for in-person clinic visit. Patient and provider were located at their individual homes.

## 2020-05-21 RX ORDER — INDAPAMIDE 1.25 MG/1
TABLET, FILM COATED ORAL
Qty: 90 TAB | Refills: 3 | Status: SHIPPED | OUTPATIENT
Start: 2020-05-21 | End: 2021-06-08 | Stop reason: SDUPTHER

## 2020-05-26 RX ORDER — VERAPAMIL HYDROCHLORIDE 120 MG/1
TABLET, FILM COATED, EXTENDED RELEASE ORAL
Qty: 30 TAB | Refills: 2 | Status: SHIPPED | OUTPATIENT
Start: 2020-05-26 | End: 2020-09-01

## 2020-06-18 ENCOUNTER — HOSPITAL ENCOUNTER (OUTPATIENT)
Dept: VASCULAR SURGERY | Age: 85
Discharge: HOME | End: 2020-06-18
Attending: PODIATRIST
Payer: MEDICARE

## 2020-06-18 DIAGNOSIS — M79.672 LEFT FOOT PAIN: ICD-10-CM

## 2020-06-18 DIAGNOSIS — M79.671 RIGHT FOOT PAIN: ICD-10-CM

## 2020-06-18 LAB
LEFT ABI: 1.16
LEFT ANTERIOR TIBIAL: 149 MMHG
LEFT ARM BP: 130 MMHG
LEFT ATA BP LEVEL: NORMAL
LEFT CALF PRESSURE: 159 MMHG
LEFT HIGH THIGH PRESSURE: 198 MMHG
LEFT LOW THIGH PRESSURE: 185 MMHG
LEFT POSTERIOR TIBIAL: 155 MMHG
LEFT TBI: 0.51
LEFT TOE PRESSURE: 68 MMHG
RIGHT ABI: 1.16
RIGHT ANTERIOR TIBIAL: 156 MMHG
RIGHT ARM BP: 134 MMHG
RIGHT ATA BP LEVEL: NORMAL
RIGHT CALF PRESSURE: 174 MMHG
RIGHT HIGH THIGH PRESSURE: 177 MMHG
RIGHT LOW THIGH PRESSURE: 176 MMHG
RIGHT POSTERIOR TIBIAL: 156 MMHG
RIGHT TBI: 0.48
RIGHT TOE PRESSURE: 64 MMHG

## 2020-06-18 PROCEDURE — 93923 UPR/LXTR ART STDY 3+ LVLS: CPT

## 2020-09-01 RX ORDER — VERAPAMIL HYDROCHLORIDE 120 MG/1
TABLET, FILM COATED, EXTENDED RELEASE ORAL
Qty: 30 TAB | Refills: 2 | Status: SHIPPED | OUTPATIENT
Start: 2020-09-01 | End: 2020-09-25 | Stop reason: SDUPTHER

## 2020-09-25 ENCOUNTER — OFFICE VISIT (OUTPATIENT)
Dept: CARDIOLOGY CLINIC | Age: 85
End: 2020-09-25
Payer: MEDICARE

## 2020-09-25 VITALS
WEIGHT: 108 LBS | BODY MASS INDEX: 21.2 KG/M2 | HEIGHT: 60 IN | RESPIRATION RATE: 16 BRPM | SYSTOLIC BLOOD PRESSURE: 147 MMHG | HEART RATE: 68 BPM | TEMPERATURE: 97.1 F | DIASTOLIC BLOOD PRESSURE: 74 MMHG | OXYGEN SATURATION: 100 %

## 2020-09-25 DIAGNOSIS — R26.81 GAIT INSTABILITY: ICD-10-CM

## 2020-09-25 DIAGNOSIS — I10 ESSENTIAL HYPERTENSION: Primary | ICD-10-CM

## 2020-09-25 DIAGNOSIS — E78.00 HYPERCHOLESTEREMIA: ICD-10-CM

## 2020-09-25 PROCEDURE — G8536 NO DOC ELDER MAL SCRN: HCPCS | Performed by: NURSE PRACTITIONER

## 2020-09-25 PROCEDURE — G8420 CALC BMI NORM PARAMETERS: HCPCS | Performed by: NURSE PRACTITIONER

## 2020-09-25 PROCEDURE — 93000 ELECTROCARDIOGRAM COMPLETE: CPT | Performed by: NURSE PRACTITIONER

## 2020-09-25 PROCEDURE — 99214 OFFICE O/P EST MOD 30 MIN: CPT | Performed by: NURSE PRACTITIONER

## 2020-09-25 PROCEDURE — 1101F PT FALLS ASSESS-DOCD LE1/YR: CPT | Performed by: NURSE PRACTITIONER

## 2020-09-25 PROCEDURE — G8427 DOCREV CUR MEDS BY ELIG CLIN: HCPCS | Performed by: NURSE PRACTITIONER

## 2020-09-25 PROCEDURE — G8754 DIAS BP LESS 90: HCPCS | Performed by: NURSE PRACTITIONER

## 2020-09-25 PROCEDURE — G8399 PT W/DXA RESULTS DOCUMENT: HCPCS | Performed by: NURSE PRACTITIONER

## 2020-09-25 PROCEDURE — 1090F PRES/ABSN URINE INCON ASSESS: CPT | Performed by: NURSE PRACTITIONER

## 2020-09-25 PROCEDURE — G8753 SYS BP > OR = 140: HCPCS | Performed by: NURSE PRACTITIONER

## 2020-09-25 PROCEDURE — G8432 DEP SCR NOT DOC, RNG: HCPCS | Performed by: NURSE PRACTITIONER

## 2020-09-25 RX ORDER — VERAPAMIL HYDROCHLORIDE 120 MG/1
TABLET, FILM COATED, EXTENDED RELEASE ORAL
Qty: 90 TAB | Refills: 3 | Status: SHIPPED | OUTPATIENT
Start: 2020-09-25 | End: 2021-03-29

## 2020-12-27 RX ORDER — CLONIDINE HYDROCHLORIDE 0.2 MG/1
TABLET ORAL
Qty: 180 TAB | Refills: 3 | Status: SHIPPED | OUTPATIENT
Start: 2020-12-27 | End: 2021-12-23

## 2021-01-02 RX ORDER — OMEPRAZOLE 40 MG/1
CAPSULE, DELAYED RELEASE ORAL
Qty: 90 CAP | Refills: 12 | Status: SHIPPED | OUTPATIENT
Start: 2021-01-02 | End: 2022-04-22 | Stop reason: SDUPTHER

## 2021-01-02 RX ORDER — OMEPRAZOLE 40 MG/1
CAPSULE, DELAYED RELEASE ORAL
Qty: 90 CAP | Refills: 12 | Status: SHIPPED | OUTPATIENT
Start: 2021-01-02 | End: 2021-03-29 | Stop reason: SDUPTHER

## 2021-03-18 RX ORDER — ALENDRONATE SODIUM 70 MG/1
TABLET ORAL
Qty: 13 TAB | Refills: 3 | Status: SHIPPED | OUTPATIENT
Start: 2021-03-18 | End: 2022-02-02

## 2021-03-18 NOTE — TELEPHONE ENCOUNTER
----- Message from Marcial Irby sent at 3/18/2021  9:12 AM EDT -----  Regarding: Dr. Mckee Noss (if not patient): pt      Relationship of caller (if not patient): self      Best contact number(s): [phone]      Name of medication and dosage if known: Alendronate 70 mg       Is patient out of this medication (yes/no): no, will be out on Monday      Pharmacy name: [id number] [facility] listed in chart? (yes/no): yes  Pharmacy phone number: [phone]           Details to clarify the request:      Marcial Irby

## 2021-03-24 NOTE — PROGRESS NOTES
[facility] @ [address], [place]  Subjective/HPI:     Noemí Griffith is a 81 y.o. female is here for routine f/u. Patient reports feeling well in her usual state of health, has not received Covid vaccination yet. Tolerating all medications well without side effects. She has not had lab work done since 2019 and plans to see Dr. Sharyne Burkitt next week. the patient denies chest pain/ shortness of breath, orthopnea, PND, LE edema, palpitations, syncope, presyncope or fatigue. 9/2020 Visit  1.  Hypertension:  Mildly elevated, repeat at end of visit 140/82, discussed with patient to eliminate processed foods which have sodium, change canned vegetables to frozen vegetables limit use of fish sticks or chicken nuggets  2.  Hyperlipidemia: LDL 91 September 2019 continue current therapy  3.  Intermittent dizziness: Resolved with reduction of diltiazem 120 mg daily. EKG sinus rhythm heart rate 70 bpm  Stable from cardiac standpoint follow-up in 6 months. Event monitor 3/2020  Interpretation Summary      Waqar Lord was in Normal Sinus with Junctional Rhythm. The average heart rate, excluding ectopy, was 73 BPM with a minimum of 45 BPM at 12:35 D1 and a maximum of 125 BPM at 13:46 D1. Heart beats, including ectopy, totaled 699622 beats. VENTRICULAR ECTOPICS totaled 3467 averaging 33.2 per hour with 3388 single, 54 paired, 0 trigeminy and 0 R on T. BIGEMINY runs occurred 4  times and totaled 25 beats. SUPRAVENTRICULAR ECTOPICS totaled 20 ,with 20 single and 0 paired beats. No patient diary was submitted, no patient triggered events noted      Echocardiogram 3/2020  Left Ventricle  Normal systolic function (ejection fraction normal). Small left ventricle. Moderate concentric hypertrophy .  The estimated ejection fraction is 60 - 65%. Visually measured ejection fraction. LV wall motion is noted to be no regional wall motion abnormality.     Left Atrium  Normal cavity size. Right Ventricle  Normal cavity size and global systolic function. Right Atrium  Normal cavity size. Aortic Valve  Trileaflet valve structure, no stenosis and no regurgitation. Mitral Valve  No stenosis. Mitral valve non-specific thickening. Trace regurgitation. Tricuspid Valve  Normal valve structure and no stenosis. Mild regurgitation. Pulmonary arterial systolic pressure is 26.7 mmHg. Mild pulmonary hypertension. Pulmonic Valve  Pulmonic valve not well visualized. Aorta  Normal aortic root. Pericardium  No evidence of pericardial effusion.       Carotid duplex 3/2020  Cerebrovascular Findings      Right Carotid      There is mild stenosis in the right CCA. There is mild stenosis in the right ICA (<50%). The right ICA has heterogeneous plaque. The right ECA is patent. The right vertebral is antegrade. The right subclavian is normal. Technically difficult due to poor probe contact.  Patient's neck is extremely thin and probe was not contacting.  Some images are sub-optimal.    Left Carotid      There is mild stenosis in the left ICA (<50%). The left ICA has mixed density plaque. The left ECA is patent. The left vertebral is not well visualized. The left subclavian is normal. Technically difficult due to poor probe contact.  Patient's neck is extremely thin and probe was not contacting.  Some images are sub-optimal.  The left vertebral artery is not visualized.         PCP Provider  Allen Siu MD  Past Medical History:   Diagnosis Date    GERD (gastroesophageal reflux disease) 2007    EGD- reflux esophagitis    Hypercholesterolemia     Hypertension     Osteopenia 11/15/2011    Pulmonary embolus (HCC)     hx. of PE after surgery    Thyroid disease       Past Surgical History:   Procedure Laterality Date    COLONOSCOPY      armando-internal hemorrhoids    HX GI      bowel obstruction 12-    HX HERNIA REPAIR  5/11/11    HX HYSTERECTOMY      HX ORTHOPAEDIC  2011    toe sx-bilateral    HX SMALL BOWEL RESECTION      4-18-10    HI FREEING BOWEL ADHESION,ENTEROLYSIS      HI REPAIR ING HERNIA,5+Y/O,REDUCIBL  5/11/11     Allergies   Allergen Reactions    Hydrochlorothiazide Other (comments)     dizziness      History reviewed. No pertinent family history. Current Outpatient Medications   Medication Sig    alendronate (FOSAMAX) 70 mg tablet take 1 tablet by mouth every week on an empty stomach with 8 ounces of water. For bones    omeprazole (PRILOSEC) 40 mg capsule TAKE ONE CAPSULE BY MOUTH DAILY FOR INDIGESTION    omeprazole (PRILOSEC) 40 mg capsule TAKE ONE CAPSULE BY MOUTH DAILY FOR INDIGESTION    omeprazole (PRILOSEC) 40 mg capsule TAKE ONE CAPSULE BY MOUTH DAILY FOR INDIGESTION    cloNIDine HCL (CATAPRES) 0.2 mg tablet TAKE 1 TABLET BY MOUTH TWICE DAILY FOR BLOOD PRESSURE    verapamil ER (CALAN-SR) 120 mg tablet TAKE 1 TABLET BY MOUTH NIGHTLY    indapamide (LOZOL) 1.25 mg tablet take 1 tablet by mouth once daily for blood pressure    furosemide (LASIX) 40 mg tablet One tab po daily for swelling in feet.  mupirocin (BACTROBAN) 2 % ointment Apply  to affected area daily.  atorvastatin (LIPITOR) 20 mg tablet take 1 tablet by mouth daily for cholesterol    levothyroxine (SYNTHROID) 50 mcg tablet take 1 tablet by mouth once daily    acetaminophen-codeine (TYLENOL #3) 300-30 mg per tablet TK 1 T PO Q 6 H PRN    meclizine (ANTIVERT) 25 mg tablet One tab every 8 hours as needed for dizziness    DOCUSATE SODIUM (COLACE PO) Take  by mouth daily.  SORE THROAT, BENZOCAINE-MENTH, 15-2.6 mg lozg lozenge as needed.  HI-CINTHIA PLUS VIT D 500 mg(1,250mg) -200 unit per tablet take 1 tablet by mouth twice a day    multivitamins-ca-iron-minerals (ONE DAILY) Tab Take one tablet by mouth once daily. No current facility-administered medications for this visit.        Vitals:    03/26/21 0937 03/26/21 0941   BP: (!) 142/66 (!) 142/62   Resp: 16 Temp: 98.1 °F (36.7 °C)    TempSrc: Temporal    SpO2: 99%    Weight: 105 lb (47.6 kg)    Height: 5' (1.524 m)      Social History     Socioeconomic History    Marital status:      Spouse name: Not on file    Number of children: Not on file    Years of education: Not on file    Highest education level: Not on file   Occupational History    Not on file   Social Needs    Financial resource strain: Not on file    Food insecurity     Worry: Not on file     Inability: Not on file    Transportation needs     Medical: Not on file     Non-medical: Not on file   Tobacco Use    Smoking status: Never Smoker    Smokeless tobacco: Never Used   Substance and Sexual Activity    Alcohol use: No    Drug use: No    Sexual activity: Not on file   Lifestyle    Physical activity     Days per week: Not on file     Minutes per session: Not on file    Stress: Not on file   Relationships    Social connections     Talks on phone: Not on file     Gets together: Not on file     Attends Anabaptist service: Not on file     Active member of club or organization: Not on file     Attends meetings of clubs or organizations: Not on file     Relationship status: Not on file    Intimate partner violence     Fear of current or ex partner: Not on file     Emotionally abused: Not on file     Physically abused: Not on file     Forced sexual activity: Not on file   Other Topics Concern    Not on file   Social History Narrative    Currently lives by herself, brought in by sister. Sister lives 5 minutes away. I have reviewed the nurses notes, vitals, problem list, allergy list, medical history, family, social history and medications. Review of Symptoms  11 systems reviewed, negative other than as stated in the HPI      Physical Exam:      General: Well developed, thin in no acute distress, cooperative and alert  HEENT: No carotid bruits, no JVD, trach is midline. Neck Supple, PERRL, EOM intact.   Heart:  Normal S1/S2 negative S3 or S4. Regular, no murmur, gallop or rub. Respiratory: Clear bilaterally x 4, no wheezing or rales  Abdomen:   Soft, non-tender, no masses, bowel sounds are active. Extremities:  No edema, normal cap refill, no cyanosis, atraumatic. Neuro: A&Ox3, speech clear, gait stable. Skin: Skin color is normal. No rashes or lesions. Non diaphoretic  Vascular: 2+ pulses symmetric in all extremities    Cardiographics    ECG: Sinus rhythm stable IN QT QTC intervals        Cardiology Labs:  Lab Results   Component Value Date/Time    Cholesterol, total 235 (H) 09/26/2019 12:53 PM    HDL Cholesterol 132 09/26/2019 12:53 PM    LDL, calculated 91 09/26/2019 12:53 PM    Triglyceride 62 09/26/2019 12:53 PM    CHOL/HDL Ratio 2.5 01/30/2009 03:11 PM       Lab Results   Component Value Date/Time    Sodium 143 09/26/2019 12:53 PM    Potassium 4.3 09/26/2019 12:53 PM    Chloride 101 09/26/2019 12:53 PM    CO2 20 09/26/2019 12:53 PM    Anion gap 11 04/10/2013 05:30 AM    Glucose 63 (L) 09/26/2019 12:53 PM    BUN 18 09/26/2019 12:53 PM    Creatinine 0.91 09/26/2019 12:53 PM    BUN/Creatinine ratio 20 09/26/2019 12:53 PM    GFR est AA 67 09/26/2019 12:53 PM    GFR est non-AA 59 (L) 09/26/2019 12:53 PM    Calcium 9.8 09/26/2019 12:53 PM    Bilirubin, total 0.2 09/26/2019 12:53 PM    Alk. phosphatase 71 09/26/2019 12:53 PM    Protein, total 7.3 09/26/2019 12:53 PM    Albumin 4.4 09/26/2019 12:53 PM    Globulin 4.0 04/01/2013 02:35 AM    A-G Ratio 1.5 09/26/2019 12:53 PM    ALT (SGPT) 17 09/26/2019 12:53 PM           Assessment:     Assessment:     Diagnoses and all orders for this visit:    1. Essential hypertension  -     AMB POC EKG ROUTINE W/ 12 LEADS, INTER & REP    2. Hypercholesteremia    3. Palpitations        ICD-10-CM ICD-9-CM    1. Essential hypertension  I10 401.9 AMB POC EKG ROUTINE W/ 12 LEADS, INTER & REP   2. Hypercholesteremia  E78.00 272.0    3.  Palpitations  R00.2 785.1      Orders Placed This Encounter    AMB POC EKG ROUTINE W/ 12 LEADS, INTER & REP     Order Specific Question:   Reason for Exam:     Answer:   hypertension        Plan:     1.  Hypertension:   Stable 142/62 which is consistent with her home blood pressure readings  2.  Hyperlipidemia: LDL 91 September 2019 continue current therapy, plans to see Dr. Sid Owen next week for routine lab work  3.  Intermittent dizziness: Resolved with reduction of diltiazem 120 mg daily. Normal sinus rhythm on EKG today  Stable from cardiac standpoint follow-up in 6 months. Otf Browne NP      Please note that this dictation was completed with [employer], the [employer] voice recognition software. Quite often unanticipated grammatical, syntax, homophones, and other interpretive errors are inadvertently transcribed by the computer software. Please disregard these errors. Please excuse any errors that have escaped final proofreading. Thank you.

## 2021-03-26 ENCOUNTER — OFFICE VISIT (OUTPATIENT)
Dept: CARDIOLOGY CLINIC | Age: 86
End: 2021-03-26
Payer: MEDICARE

## 2021-03-26 VITALS
RESPIRATION RATE: 16 BRPM | TEMPERATURE: 98.1 F | SYSTOLIC BLOOD PRESSURE: 142 MMHG | WEIGHT: 105 LBS | DIASTOLIC BLOOD PRESSURE: 62 MMHG | BODY MASS INDEX: 20.62 KG/M2 | OXYGEN SATURATION: 99 % | HEIGHT: 60 IN

## 2021-03-26 DIAGNOSIS — I10 ESSENTIAL HYPERTENSION: Primary | ICD-10-CM

## 2021-03-26 DIAGNOSIS — E78.00 HYPERCHOLESTEREMIA: ICD-10-CM

## 2021-03-26 DIAGNOSIS — R00.2 PALPITATIONS: ICD-10-CM

## 2021-03-26 PROCEDURE — G8399 PT W/DXA RESULTS DOCUMENT: HCPCS | Performed by: NURSE PRACTITIONER

## 2021-03-26 PROCEDURE — 99214 OFFICE O/P EST MOD 30 MIN: CPT | Performed by: NURSE PRACTITIONER

## 2021-03-26 PROCEDURE — G8536 NO DOC ELDER MAL SCRN: HCPCS | Performed by: NURSE PRACTITIONER

## 2021-03-26 PROCEDURE — G8427 DOCREV CUR MEDS BY ELIG CLIN: HCPCS | Performed by: NURSE PRACTITIONER

## 2021-03-26 PROCEDURE — 1101F PT FALLS ASSESS-DOCD LE1/YR: CPT | Performed by: NURSE PRACTITIONER

## 2021-03-26 PROCEDURE — 93000 ELECTROCARDIOGRAM COMPLETE: CPT | Performed by: NURSE PRACTITIONER

## 2021-03-26 PROCEDURE — G8420 CALC BMI NORM PARAMETERS: HCPCS | Performed by: NURSE PRACTITIONER

## 2021-03-26 PROCEDURE — G8754 DIAS BP LESS 90: HCPCS | Performed by: NURSE PRACTITIONER

## 2021-03-26 PROCEDURE — 1090F PRES/ABSN URINE INCON ASSESS: CPT | Performed by: NURSE PRACTITIONER

## 2021-03-26 PROCEDURE — G8753 SYS BP > OR = 140: HCPCS | Performed by: NURSE PRACTITIONER

## 2021-03-26 PROCEDURE — G8432 DEP SCR NOT DOC, RNG: HCPCS | Performed by: NURSE PRACTITIONER

## 2021-03-29 ENCOUNTER — OFFICE VISIT (OUTPATIENT)
Dept: FAMILY MEDICINE CLINIC | Age: 86
End: 2021-03-29
Payer: MEDICARE

## 2021-03-29 VITALS
BODY MASS INDEX: 20.5 KG/M2 | WEIGHT: 104.4 LBS | TEMPERATURE: 97.5 F | HEART RATE: 71 BPM | RESPIRATION RATE: 16 BRPM | HEIGHT: 60 IN | DIASTOLIC BLOOD PRESSURE: 71 MMHG | SYSTOLIC BLOOD PRESSURE: 175 MMHG

## 2021-03-29 DIAGNOSIS — I10 ESSENTIAL HYPERTENSION: ICD-10-CM

## 2021-03-29 DIAGNOSIS — Z00.00 ENCOUNTER FOR MEDICARE ANNUAL WELLNESS EXAM: Primary | ICD-10-CM

## 2021-03-29 DIAGNOSIS — E78.00 HYPERCHOLESTEROLEMIA: ICD-10-CM

## 2021-03-29 PROCEDURE — 1090F PRES/ABSN URINE INCON ASSESS: CPT | Performed by: FAMILY MEDICINE

## 2021-03-29 PROCEDURE — G8510 SCR DEP NEG, NO PLAN REQD: HCPCS | Performed by: FAMILY MEDICINE

## 2021-03-29 PROCEDURE — G8399 PT W/DXA RESULTS DOCUMENT: HCPCS | Performed by: FAMILY MEDICINE

## 2021-03-29 PROCEDURE — G8754 DIAS BP LESS 90: HCPCS | Performed by: FAMILY MEDICINE

## 2021-03-29 PROCEDURE — 1101F PT FALLS ASSESS-DOCD LE1/YR: CPT | Performed by: FAMILY MEDICINE

## 2021-03-29 PROCEDURE — G8536 NO DOC ELDER MAL SCRN: HCPCS | Performed by: FAMILY MEDICINE

## 2021-03-29 PROCEDURE — G8753 SYS BP > OR = 140: HCPCS | Performed by: FAMILY MEDICINE

## 2021-03-29 PROCEDURE — 99213 OFFICE O/P EST LOW 20 MIN: CPT | Performed by: FAMILY MEDICINE

## 2021-03-29 PROCEDURE — G8420 CALC BMI NORM PARAMETERS: HCPCS | Performed by: FAMILY MEDICINE

## 2021-03-29 PROCEDURE — G0439 PPPS, SUBSEQ VISIT: HCPCS | Performed by: FAMILY MEDICINE

## 2021-03-29 PROCEDURE — G8427 DOCREV CUR MEDS BY ELIG CLIN: HCPCS | Performed by: FAMILY MEDICINE

## 2021-03-29 RX ORDER — VERAPAMIL HYDROCHLORIDE 180 MG/1
TABLET, EXTENDED RELEASE ORAL
Qty: 90 TAB | Refills: 3 | Status: SHIPPED | OUTPATIENT
Start: 2021-03-29 | End: 2021-09-29 | Stop reason: ALTCHOICE

## 2021-03-29 RX ORDER — LEVOTHYROXINE SODIUM 50 UG/1
TABLET ORAL
Qty: 90 TAB | Refills: 12 | Status: SHIPPED | OUTPATIENT
Start: 2021-03-29 | End: 2021-10-05

## 2021-03-29 RX ORDER — ATORVASTATIN CALCIUM 20 MG/1
TABLET, FILM COATED ORAL
Qty: 90 TAB | Refills: 3 | Status: SHIPPED | OUTPATIENT
Start: 2021-03-29 | End: 2022-01-06

## 2021-03-29 NOTE — PROGRESS NOTES
HISTORY OF PRESENT ILLNESS  Alex Dow is a 81 y.o. female. HPI In for blood pressure and cholesterol check. Needs annual wellness exam also. Seeing Dr. Cristian Brown (cardiology), Dr. Aspen Brito (podiatry). UTD on immunizations, hasnt had covid vaccine yet. Review of Systems   HENT: Negative for hearing loss. Neurological:        No falls, uses a walker. Independent in all adls. Still lives by herself. Sister goes to see her everyday. Memory is fair. Psychiatric/Behavioral: Negative for depression. No alcohol.    would want her sister Columba Saini to be her mPOA if became incapacitated. Physical Exam  Vitals signs and nursing note reviewed. Constitutional:       Appearance: She is well-developed. HENT:      Right Ear: External ear normal.      Left Ear: External ear normal.   Neck:      Thyroid: No thyromegaly. Cardiovascular:      Rate and Rhythm: Normal rate and regular rhythm. Heart sounds: Normal heart sounds. Pulmonary:      Breath sounds: Normal breath sounds. No wheezing. Abdominal:      General: Bowel sounds are normal. There is no distension. Palpations: Abdomen is soft. There is no mass. Tenderness: There is no abdominal tenderness. Musculoskeletal:      Comments: Tr edema bilaterally     Lymphadenopathy:      Cervical: No cervical adenopathy. ASSESSMENT and PLAN  Orders Placed This Encounter    CBC WITH AUTOMATED DIFF    METABOLIC PANEL, COMPREHENSIVE    LIPID PANEL    levothyroxine (SYNTHROID) 50 mcg tablet    verapamil ER (CALAN-SR) 180 mg CR tablet    atorvastatin (LIPITOR) 20 mg tablet     Diagnoses and all orders for this visit:    1. Essential hypertension  -     CBC WITH AUTOMATED DIFF; Future  -     METABOLIC PANEL, COMPREHENSIVE; Future    2. Hypercholesterolemia  -     LIPID PANEL; Future    3.  Encounter for Medicare annual wellness exam    Other orders  -     levothyroxine (SYNTHROID) 50 mcg tablet; take 1 tablet by mouth once daily  -     verapamil ER (CALAN-SR) 180 mg CR tablet; TAKE 1 TABLET BY MOUTH NIGHTLY for blood pressure  -     atorvastatin (LIPITOR) 20 mg tablet; take 1 tablet by mouth daily for cholesterol      Follow-up and Dispositions    · Return in about 6 months (around 9/29/2021).

## 2021-03-29 NOTE — PROGRESS NOTES
Chief Complaint   Patient presents with   [facility] Annual Wellness Visit     1. Have you been to the ER, urgent care clinic since your last visit? Hospitalized since your last visit? No    2. Have you seen or consulted any other health care providers outside of the [address] [place] [address] since your last visit? Include any pap smears or colon screening.  Yes Dr. Jaylan Garcia - cardiology

## 2021-03-30 LAB
ALBUMIN SERPL-MCNC: 4.1 G/DL (ref 3.5–5)
ALBUMIN/GLOB SERPL: 1.1 {RATIO} (ref 1.1–2.2)
ALP SERPL-CCNC: 82 U/L (ref 45–117)
ALT SERPL-CCNC: 43 U/L (ref 12–78)
ANION GAP SERPL CALC-SCNC: 7 MMOL/L (ref 5–15)
AST SERPL-CCNC: 35 U/L (ref 15–37)
BASOPHILS # BLD: 0 K/UL (ref 0–0.1)
BASOPHILS NFR BLD: 1 % (ref 0–1)
BILIRUB SERPL-MCNC: 0.4 MG/DL (ref 0.2–1)
BUN SERPL-MCNC: 21 MG/DL (ref 6–20)
BUN/CREAT SERPL: 24 (ref 12–20)
CALCIUM SERPL-MCNC: 9.6 MG/DL (ref 8.5–10.1)
CHLORIDE SERPL-SCNC: 105 MMOL/L (ref 97–108)
CHOLEST SERPL-MCNC: 175 MG/DL
CO2 SERPL-SCNC: 27 MMOL/L (ref 21–32)
CREAT SERPL-MCNC: 0.87 MG/DL (ref 0.55–1.02)
DIFFERENTIAL METHOD BLD: ABNORMAL
EOSINOPHIL # BLD: 0.1 K/UL (ref 0–0.4)
EOSINOPHIL NFR BLD: 1 % (ref 0–7)
ERYTHROCYTE [DISTWIDTH] IN BLOOD BY AUTOMATED COUNT: 14.6 % (ref 11.5–14.5)
GLOBULIN SER CALC-MCNC: 3.8 G/DL (ref 2–4)
GLUCOSE SERPL-MCNC: 84 MG/DL (ref 65–100)
HCT VFR BLD AUTO: 39.5 % (ref 35–47)
HDLC SERPL-MCNC: 123 MG/DL
HDLC SERPL: 1.4 {RATIO} (ref 0–5)
HGB BLD-MCNC: 11.8 G/DL (ref 11.5–16)
IMM GRANULOCYTES # BLD AUTO: 0 K/UL (ref 0–0.04)
IMM GRANULOCYTES NFR BLD AUTO: 0 % (ref 0–0.5)
LDLC SERPL CALC-MCNC: 39.2 MG/DL (ref 0–100)
LIPID PROFILE,FLP: NORMAL
LYMPHOCYTES # BLD: 2.2 K/UL (ref 0.8–3.5)
LYMPHOCYTES NFR BLD: 34 % (ref 12–49)
MCH RBC QN AUTO: 26.9 PG (ref 26–34)
MCHC RBC AUTO-ENTMCNC: 29.9 G/DL (ref 30–36.5)
MCV RBC AUTO: 90 FL (ref 80–99)
MONOCYTES # BLD: 0.3 K/UL (ref 0–1)
MONOCYTES NFR BLD: 5 % (ref 5–13)
NEUTS SEG # BLD: 3.9 K/UL (ref 1.8–8)
NEUTS SEG NFR BLD: 59 % (ref 32–75)
NRBC # BLD: 0 K/UL (ref 0–0.01)
NRBC BLD-RTO: 0 PER 100 WBC
PLATELET # BLD AUTO: 197 K/UL (ref 150–400)
PMV BLD AUTO: 12.6 FL (ref 8.9–12.9)
POTASSIUM SERPL-SCNC: 4.5 MMOL/L (ref 3.5–5.1)
PROT SERPL-MCNC: 7.9 G/DL (ref 6.4–8.2)
RBC # BLD AUTO: 4.39 M/UL (ref 3.8–5.2)
SODIUM SERPL-SCNC: 139 MMOL/L (ref 136–145)
TRIGL SERPL-MCNC: 64 MG/DL (ref ?–150)
VLDLC SERPL CALC-MCNC: 12.8 MG/DL
WBC # BLD AUTO: 6.5 K/UL (ref 3.6–11)

## 2021-06-08 RX ORDER — INDAPAMIDE 1.25 MG/1
TABLET, FILM COATED ORAL
Qty: 90 TABLET | Refills: 3 | Status: SHIPPED | OUTPATIENT
Start: 2021-06-08 | End: 2022-07-14

## 2021-06-08 NOTE — TELEPHONE ENCOUNTER
Patient wants a return call regarding getting the medication indapamide (LOZOL) 1.25 mg tablet.   Please give him a call @ [phone]

## 2021-09-28 NOTE — PROGRESS NOTES
[facility] @ [address], [place]  Subjective/HPI:     Mariana Stewart is a 81 y.o. female is here for routine f/u. Ms. Lord reports having dizziness, occurs mainly when sitting denies association with positional change, no syncopal or presyncopal feelings. Had addressed this last year she wore a Holter monitor with heart rate dipping into the 40s reduce diltiazem from 240 mg down to 120 mg she had reported improvement, diltiazem had been increased to 180 mg for hypertension management. 3/26/21 Visit  1.  Hypertension:   Stable 142/62 which is consistent with her home blood pressure readings  2.  Hyperlipidemia: LDL 91 September 2019 continue current therapy, plans to see Dr. Paola Chang next week for routine lab work  3.  Intermittent dizziness: Resolved with reduction of diltiazem 120 mg daily. Normal sinus rhythm on EKG today  Stable from cardiac standpoint follow-up in 6 months. Event monitor 3/2020  Interpretation Summary      Shearin, Lella Angelucci was in Normal Sinus with Junctional Rhythm. The average heart rate, excluding ectopy, was 73 BPM with a minimum of 45 BPM at 12:35 D1 and a maximum of 125 BPM at 13:46 D1. Heart beats, including ectopy, totaled 536190 beats. VENTRICULAR ECTOPICS totaled 3467 averaging 33.2 per hour with 3388 single, 54 paired, 0 trigeminy and 0 R on T. BIGEMINY runs occurred 4  times and totaled 25 beats. SUPRAVENTRICULAR ECTOPICS totaled 20 ,with 20 single and 0 paired beats. No patient diary was submitted, no patient triggered events noted      Echocardiogram 3/2020  Left Ventricle  Normal systolic function (ejection fraction normal). Small left ventricle. Moderate concentric hypertrophy .  The estimated ejection fraction is 60 - 65%. Visually measured ejection fraction. LV wall motion is noted to be no regional wall motion abnormality. Left Atrium  Normal cavity size.     Right Ventricle  Normal cavity size and global systolic function. Right Atrium  Normal cavity size. Aortic Valve  Trileaflet valve structure, no stenosis and no regurgitation. Mitral Valve  No stenosis. Mitral valve non-specific thickening. Trace regurgitation. Tricuspid Valve  Normal valve structure and no stenosis. Mild regurgitation. Pulmonary arterial systolic pressure is 53.2 mmHg. Mild pulmonary hypertension. Pulmonic Valve  Pulmonic valve not well visualized. Aorta  Normal aortic root. Pericardium  No evidence of pericardial effusion.       Carotid duplex 3/2020  Cerebrovascular Findings      Right Carotid      There is mild stenosis in the right CCA. There is mild stenosis in the right ICA (<50%). The right ICA has heterogeneous plaque. The right ECA is patent. The right vertebral is antegrade. The right subclavian is normal. Technically difficult due to poor probe contact.  Patient's neck is extremely thin and probe was not contacting.  Some images are sub-optimal.    Left Carotid      There is mild stenosis in the left ICA (<50%). The left ICA has mixed density plaque. The left ECA is patent. The left vertebral is not well visualized.  The left subclavian is normal. Technically difficult due to poor probe contact.  Patient's neck is extremely thin and probe was not contacting.  Some images are sub-optimal.  The left vertebral artery is not visualized.        PCP Provider  Laron Campo MD  Past Medical History:   Diagnosis Date    GERD (gastroesophageal reflux disease) 2007    EGD- reflux esophagitis    Hypercholesterolemia     Hypertension     Osteopenia 11/15/2011    Pulmonary embolus (HCC)     hx. of PE after surgery    Thyroid disease       Past Surgical History:   Procedure Laterality Date    COLONOSCOPY      armando-internal hemorrhoids    HX GI      bowel obstruction 12-    HX HERNIA REPAIR  5/11/11    HX HYSTERECTOMY      HX ORTHOPAEDIC  2011    toe sx-bilateral    HX SMALL BOWEL RESECTION 4-18-10    [place] FREEING BOWEL ADHESION,ENTEROLYSIS      [place] REPAIR ING HERNIA,5+Y/O,REDUCIBL  5/11/11     Allergies   Allergen Reactions    Hydrochlorothiazide Other (comments)     dizziness      No family history on file. Current Outpatient Medications   Medication Sig    indapamide (LOZOL) 1.25 mg tablet take 1 tablet by mouth once daily for blood pressure    levothyroxine (SYNTHROID) 50 mcg tablet take 1 tablet by mouth once daily    verapamil ER (CALAN-SR) 180 mg CR tablet TAKE 1 TABLET BY MOUTH NIGHTLY for blood pressure    atorvastatin (LIPITOR) 20 mg tablet take 1 tablet by mouth daily for cholesterol    alendronate (FOSAMAX) 70 mg tablet take 1 tablet by mouth every week on an empty stomach with 8 ounces of water. For bones    omeprazole (PRILOSEC) 40 mg capsule TAKE ONE CAPSULE BY MOUTH DAILY FOR INDIGESTION    cloNIDine HCL (CATAPRES) 0.2 mg tablet TAKE 1 TABLET BY MOUTH TWICE DAILY FOR BLOOD PRESSURE    furosemide (LASIX) 40 mg tablet One tab po daily for swelling in feet.  mupirocin (BACTROBAN) 2 % ointment Apply  to affected area daily.  meclizine (ANTIVERT) 25 mg tablet One tab every 8 hours as needed for dizziness    DOCUSATE SODIUM (COLACE PO) Take  by mouth daily.  SORE THROAT, BENZOCAINE-MENTH, 15-2.6 mg lozg lozenge as needed.  HI-CINTHIA PLUS VIT D 500 mg(1,250mg) -200 unit per tablet take 1 tablet by mouth twice a day    multivitamins-ca-iron-minerals (ONE DAILY) Tab Take one tablet by mouth once daily. No current facility-administered medications for this visit. There were no vitals filed for this visit.   Social History     Socioeconomic History    Marital status:      Spouse name: Not on file    Number of children: Not on file    Years of education: Not on file    Highest education level: Not on file   Occupational History    Not on file   Tobacco Use    Smoking status: Never Smoker    Smokeless tobacco: Never Used   Substance and Sexual Activity    Alcohol use: No    Drug use: No    Sexual activity: Not on file   Other Topics Concern    Not on file   Social History Narrative    Currently lives by herself, brought in by sister. Sister lives 5 minutes away. Social Determinants of Health     Financial Resource Strain:     Difficulty of Paying Living Expenses:    Food Insecurity:     Worried About Running Out of Food in the Last Year:     [address] in the Last Year:    Transportation Needs:     Lack of Transportation (Medical):  Lack of Transportation (Non-Medical):    Physical Activity:     Days of Exercise per Week:     Minutes of Exercise per Session:    Stress:     Feeling of Stress :    Social Connections:     Frequency of Communication with Friends and Family:     Frequency of Social Gatherings with Friends and Family:     Attends Muslim Services:     Active Member of Clubs or Organizations:     Attends Club or Organization Meetings:     Marital Status:    Intimate Partner Violence:     Fear of Current or Ex-Partner:     Emotionally Abused:     Physically Abused:     Sexually Abused:        I have reviewed the nurses notes, vitals, problem list, allergy list, medical history, family, social history and medications. Review of Symptoms  11 systems reviewed, negative other than as stated in the HPI      Physical Exam:      General: Well developed, in no acute distress, cooperative and alert  HEENT: No carotid bruits, no JVD, trach is midline. Neck Supple, PERRL. Bilateral tympanic membranes normal, no air-fluid level. Dizziness was not reproducible by lateral/up down head movement. Heart:  Normal S1/S2 negative S3 or S4. Regular, no murmur, gallop or rub. Apical heart rate 52 bpm  Respiratory: Clear bilaterally x 4, no wheezing or rales  Abdomen:   Soft, non-tender, no masses, bowel sounds are active. Extremities:  No edema, normal cap refill, no cyanosis, atraumatic.    Neuro: A&Ox3, speech clear, gait slow cautious using walker  Skin: Skin color is normal. No rashes or lesions. Non diaphoretic  Vascular: 2+ pulses symmetric in all extremities    Cardiographics    ECG: Sinus bradycardia        Cardiology Labs:  Lab Results   Component Value Date/Time    Cholesterol, total 175 03/29/2021 01:52 PM    HDL Cholesterol 123 03/29/2021 01:52 PM    LDL, calculated 39.2 03/29/2021 01:52 PM    Triglyceride 64 03/29/2021 01:52 PM    CHOL/HDL Ratio 1.4 03/29/2021 01:52 PM       Lab Results   Component Value Date/Time    Sodium 139 03/29/2021 01:52 PM    Potassium 4.5 03/29/2021 01:52 PM    Chloride 105 03/29/2021 01:52 PM    CO2 27 03/29/2021 01:52 PM    Anion gap 7 03/29/2021 01:52 PM    Glucose 84 03/29/2021 01:52 PM    BUN 21 (H) 03/29/2021 01:52 PM    Creatinine 0.87 03/29/2021 01:52 PM    BUN/Creatinine ratio 24 (H) 03/29/2021 01:52 PM    GFR est AA >60 03/29/2021 01:52 PM    GFR est non-AA >60 03/29/2021 01:52 PM    Calcium 9.6 03/29/2021 01:52 PM    Bilirubin, total 0.4 03/29/2021 01:52 PM    Alk. phosphatase 82 03/29/2021 01:52 PM    Protein, total 7.9 03/29/2021 01:52 PM    Albumin 4.1 03/29/2021 01:52 PM    Globulin 3.8 03/29/2021 01:52 PM    A-G Ratio 1.1 03/29/2021 01:52 PM    ALT (SGPT) 43 03/29/2021 01:52 PM           Assessment:     Assessment:     Diagnoses and all orders for this visit:    1. Essential hypertension    2. Hypercholesteremia        ICD-10-CM ICD-9-CM    1. Essential hypertension  I10 401.9    2. Hypercholesteremia  E78.00 272.0      No orders of the defined types were placed in this encounter. Plan:     1. HTN: Mildly hypertensive 146/78. See plan as below  2. High Choesterol: LDL 39 3/2021  3. Recurrent dizziness: Bradycardic on assessment today pulse ox 53 bpm, 56 on EKG. Carotid duplex normal last year, last year heart rate was in the 40s sinus rhythm with intermittent junctional when on diltiazem 240 mg.   Will discontinue diltiazem and use amlodipine 5 mg for hypertension management without nan agent control. I will consider repeat Holter monitor off nan agent if she remains bradycardic given previous episode of junctional rhythm last year. Follow-up 1 month for repeat EKG blood pressure check. Shayy Pan NP      Please note that this dictation was completed with [employer], the computer voice recognition software. Quite often unanticipated grammatical, syntax, homophones, and other interpretive errors are inadvertently transcribed by the computer software. Please disregard these errors. Please excuse any errors that have escaped final proofreading. Thank you.

## 2021-09-29 ENCOUNTER — OFFICE VISIT (OUTPATIENT)
Dept: CARDIOLOGY CLINIC | Age: 86
End: 2021-09-29
Payer: MEDICARE

## 2021-09-29 VITALS
HEIGHT: 60 IN | SYSTOLIC BLOOD PRESSURE: 146 MMHG | HEART RATE: 53 BPM | OXYGEN SATURATION: 100 % | DIASTOLIC BLOOD PRESSURE: 78 MMHG | RESPIRATION RATE: 18 BRPM | WEIGHT: 99 LBS | BODY MASS INDEX: 19.44 KG/M2

## 2021-09-29 DIAGNOSIS — E78.00 HYPERCHOLESTEREMIA: ICD-10-CM

## 2021-09-29 DIAGNOSIS — I10 ESSENTIAL HYPERTENSION: Primary | ICD-10-CM

## 2021-09-29 PROCEDURE — G8420 CALC BMI NORM PARAMETERS: HCPCS | Performed by: NURSE PRACTITIONER

## 2021-09-29 PROCEDURE — 99214 OFFICE O/P EST MOD 30 MIN: CPT | Performed by: NURSE PRACTITIONER

## 2021-09-29 PROCEDURE — G8754 DIAS BP LESS 90: HCPCS | Performed by: NURSE PRACTITIONER

## 2021-09-29 PROCEDURE — 1101F PT FALLS ASSESS-DOCD LE1/YR: CPT | Performed by: NURSE PRACTITIONER

## 2021-09-29 PROCEDURE — G8536 NO DOC ELDER MAL SCRN: HCPCS | Performed by: NURSE PRACTITIONER

## 2021-09-29 PROCEDURE — 1090F PRES/ABSN URINE INCON ASSESS: CPT | Performed by: NURSE PRACTITIONER

## 2021-09-29 PROCEDURE — G8432 DEP SCR NOT DOC, RNG: HCPCS | Performed by: NURSE PRACTITIONER

## 2021-09-29 PROCEDURE — 93000 ELECTROCARDIOGRAM COMPLETE: CPT | Performed by: NURSE PRACTITIONER

## 2021-09-29 PROCEDURE — G8399 PT W/DXA RESULTS DOCUMENT: HCPCS | Performed by: NURSE PRACTITIONER

## 2021-09-29 PROCEDURE — G8427 DOCREV CUR MEDS BY ELIG CLIN: HCPCS | Performed by: NURSE PRACTITIONER

## 2021-09-29 PROCEDURE — G8753 SYS BP > OR = 140: HCPCS | Performed by: NURSE PRACTITIONER

## 2021-09-29 RX ORDER — AMLODIPINE BESYLATE 5 MG/1
5 TABLET ORAL DAILY
Qty: 30 TABLET | Refills: 1 | Status: SHIPPED | OUTPATIENT
Start: 2021-09-29 | End: 2021-09-29

## 2021-09-29 RX ORDER — AMLODIPINE BESYLATE 5 MG/1
TABLET ORAL
Qty: 90 TABLET | Refills: 1 | Status: SHIPPED | OUTPATIENT
Start: 2021-09-29 | End: 2022-01-06

## 2021-09-29 RX ORDER — LIDOCAINE AND PRILOCAINE 25; 25 MG/G; MG/G
CREAM TOPICAL
COMMUNITY
Start: 2021-07-22 | End: 2021-09-29

## 2021-09-29 NOTE — PROGRESS NOTES
1. Have you been to the ER, urgent care clinic since your last visit? Hospitalized since your last visit? No    2. Have you seen or consulted any other health care providers outside of the [address] [place] [address] since your last visit? Include any pap smears or colon screening. Brigid Adams on 9/28/2021.       Chief Complaint   Patient presents with    Follow-up     6 mo appt    Dizziness

## 2021-09-30 ENCOUNTER — OFFICE VISIT (OUTPATIENT)
Dept: FAMILY MEDICINE CLINIC | Age: 86
End: 2021-09-30
Payer: MEDICARE

## 2021-09-30 VITALS
DIASTOLIC BLOOD PRESSURE: 59 MMHG | WEIGHT: 100.6 LBS | HEART RATE: 61 BPM | SYSTOLIC BLOOD PRESSURE: 146 MMHG | TEMPERATURE: 96.9 F | BODY MASS INDEX: 19.75 KG/M2 | HEIGHT: 60 IN | RESPIRATION RATE: 20 BRPM | OXYGEN SATURATION: 99 %

## 2021-09-30 DIAGNOSIS — E03.9 ACQUIRED HYPOTHYROIDISM: ICD-10-CM

## 2021-09-30 DIAGNOSIS — Z23 NEEDS FLU SHOT: ICD-10-CM

## 2021-09-30 DIAGNOSIS — I10 ESSENTIAL HYPERTENSION: Primary | ICD-10-CM

## 2021-09-30 DIAGNOSIS — E78.00 HYPERCHOLESTEROLEMIA: ICD-10-CM

## 2021-09-30 LAB
ALBUMIN SERPL-MCNC: 3.4 G/DL (ref 3.5–5)
ALBUMIN/GLOB SERPL: 0.9 {RATIO} (ref 1.1–2.2)
ALP SERPL-CCNC: 72 U/L (ref 45–117)
ALT SERPL-CCNC: 32 U/L (ref 12–78)
ANION GAP SERPL CALC-SCNC: 7 MMOL/L (ref 5–15)
AST SERPL-CCNC: 28 U/L (ref 15–37)
BASOPHILS # BLD: 0 K/UL (ref 0–0.1)
BASOPHILS NFR BLD: 0 % (ref 0–1)
BILIRUB SERPL-MCNC: 0.2 MG/DL (ref 0.2–1)
BUN SERPL-MCNC: 22 MG/DL (ref 6–20)
BUN/CREAT SERPL: 23 (ref 12–20)
CALCIUM SERPL-MCNC: 9.2 MG/DL (ref 8.5–10.1)
CHLORIDE SERPL-SCNC: 103 MMOL/L (ref 97–108)
CHOLEST SERPL-MCNC: 144 MG/DL
CO2 SERPL-SCNC: 29 MMOL/L (ref 21–32)
CREAT SERPL-MCNC: 0.95 MG/DL (ref 0.55–1.02)
DIFFERENTIAL METHOD BLD: ABNORMAL
EOSINOPHIL # BLD: 0.1 K/UL (ref 0–0.4)
EOSINOPHIL NFR BLD: 2 % (ref 0–7)
ERYTHROCYTE [DISTWIDTH] IN BLOOD BY AUTOMATED COUNT: 13.9 % (ref 11.5–14.5)
GLOBULIN SER CALC-MCNC: 3.6 G/DL (ref 2–4)
GLUCOSE SERPL-MCNC: 89 MG/DL (ref 65–100)
HCT VFR BLD AUTO: 34.9 % (ref 35–47)
HDLC SERPL-MCNC: 107 MG/DL
HDLC SERPL: 1.3 {RATIO} (ref 0–5)
HGB BLD-MCNC: 10.9 G/DL (ref 11.5–16)
IMM GRANULOCYTES # BLD AUTO: 0 K/UL (ref 0–0.04)
IMM GRANULOCYTES NFR BLD AUTO: 0 % (ref 0–0.5)
LDLC SERPL CALC-MCNC: 30.4 MG/DL (ref 0–100)
LYMPHOCYTES # BLD: 1.8 K/UL (ref 0.8–3.5)
LYMPHOCYTES NFR BLD: 34 % (ref 12–49)
MCH RBC QN AUTO: 27.5 PG (ref 26–34)
MCHC RBC AUTO-ENTMCNC: 31.2 G/DL (ref 30–36.5)
MCV RBC AUTO: 87.9 FL (ref 80–99)
MONOCYTES # BLD: 0.3 K/UL (ref 0–1)
MONOCYTES NFR BLD: 6 % (ref 5–13)
NEUTS SEG # BLD: 3.2 K/UL (ref 1.8–8)
NEUTS SEG NFR BLD: 58 % (ref 32–75)
NRBC # BLD: 0 K/UL (ref 0–0.01)
NRBC BLD-RTO: 0 PER 100 WBC
PLATELET # BLD AUTO: 186 K/UL (ref 150–400)
PMV BLD AUTO: 11.8 FL (ref 8.9–12.9)
POTASSIUM SERPL-SCNC: 3.9 MMOL/L (ref 3.5–5.1)
PROT SERPL-MCNC: 7 G/DL (ref 6.4–8.2)
RBC # BLD AUTO: 3.97 M/UL (ref 3.8–5.2)
SODIUM SERPL-SCNC: 139 MMOL/L (ref 136–145)
T4 FREE SERPL-MCNC: 1.1 NG/DL (ref 0.8–1.5)
TRIGL SERPL-MCNC: 33 MG/DL (ref ?–150)
TSH SERPL DL<=0.05 MIU/L-ACNC: 0.27 UIU/ML (ref 0.36–3.74)
VLDLC SERPL CALC-MCNC: 6.6 MG/DL
WBC # BLD AUTO: 5.4 K/UL (ref 3.6–11)

## 2021-09-30 PROCEDURE — G8427 DOCREV CUR MEDS BY ELIG CLIN: HCPCS | Performed by: FAMILY MEDICINE

## 2021-09-30 PROCEDURE — 90694 VACC AIIV4 NO PRSRV 0.5ML IM: CPT | Performed by: FAMILY MEDICINE

## 2021-09-30 PROCEDURE — 1090F PRES/ABSN URINE INCON ASSESS: CPT | Performed by: FAMILY MEDICINE

## 2021-09-30 PROCEDURE — G0008 ADMIN INFLUENZA VIRUS VAC: HCPCS | Performed by: FAMILY MEDICINE

## 2021-09-30 PROCEDURE — G8420 CALC BMI NORM PARAMETERS: HCPCS | Performed by: FAMILY MEDICINE

## 2021-09-30 PROCEDURE — G8510 SCR DEP NEG, NO PLAN REQD: HCPCS | Performed by: FAMILY MEDICINE

## 2021-09-30 PROCEDURE — 99213 OFFICE O/P EST LOW 20 MIN: CPT | Performed by: FAMILY MEDICINE

## 2021-09-30 PROCEDURE — G8754 DIAS BP LESS 90: HCPCS | Performed by: FAMILY MEDICINE

## 2021-09-30 PROCEDURE — G8536 NO DOC ELDER MAL SCRN: HCPCS | Performed by: FAMILY MEDICINE

## 2021-09-30 PROCEDURE — G8399 PT W/DXA RESULTS DOCUMENT: HCPCS | Performed by: FAMILY MEDICINE

## 2021-09-30 PROCEDURE — 1101F PT FALLS ASSESS-DOCD LE1/YR: CPT | Performed by: FAMILY MEDICINE

## 2021-09-30 PROCEDURE — G8753 SYS BP > OR = 140: HCPCS | Performed by: FAMILY MEDICINE

## 2021-09-30 NOTE — PROGRESS NOTES
HISTORY OF PRESENT ILLNESS  Alex Torres is a 81 y.o. female. HPI Pt. Comes in for blood pressure and cholesterol check. Missed eye appt Monday due to dizziness. Saw Dr. Terri Garrett yesterday. Her heart rate was low, had her diltiazem dced and amlodipine was started. Feeling ok today. No chest pains, dyspnea, swelling in feet, palpitations. ROS    Physical Exam  Vitals and nursing note reviewed. Constitutional:       Appearance: She is well-developed. HENT:      Right Ear: External ear normal.      Left Ear: External ear normal.   Neck:      Thyroid: No thyromegaly. Cardiovascular:      Rate and Rhythm: Normal rate and regular rhythm. Heart sounds: Normal heart sounds. Pulmonary:      Breath sounds: Normal breath sounds. No wheezing. Abdominal:      General: Bowel sounds are normal. There is no distension. Palpations: Abdomen is soft. There is no mass. Tenderness: There is no abdominal tenderness. Lymphadenopathy:      Cervical: No cervical adenopathy. bp 130/50 sttting, 135/85 standing    ASSESSMENT and PLAN  Orders Placed This Encounter    Influenza Vaccine, QUAD, 65 Yrs +  IM  (Fluad 03236 )    CBC WITH AUTOMATED DIFF    METABOLIC PANEL, COMPREHENSIVE    T4, FREE    TSH 3RD GENERATION    LIPID PANEL     Diagnoses and all orders for this visit:    1. Essential hypertension  -     CBC WITH AUTOMATED DIFF; Future  -     METABOLIC PANEL, COMPREHENSIVE; Future    2. Hypercholesterolemia  -     LIPID PANEL; Future    3. Acquired hypothyroidism  -     T4, FREE; Future  -     TSH 3RD GENERATION; Future    4. Needs flu shot  -     FLU (FLUAD QUAD INFLUENZA VACCINE,QUAD,ADJUVANTED)      Follow-up and Dispositions    · Return in about 3 months (around 12/30/2021).

## 2021-09-30 NOTE — PROGRESS NOTES
Chief Complaint   Patient presents with    Hypertension     follow up    Cholesterol Problem     follow up     1. Have you been to the ER, urgent care clinic since your last visit? Hospitalized since your last visit?no    2. Have you seen or consulted any other health care providers outside of the [address] [place] [address] since your last visit? Include any pap smears or colon screening.  no

## 2021-10-05 ENCOUNTER — TELEPHONE (OUTPATIENT)
Dept: FAMILY MEDICINE CLINIC | Age: 86
End: 2021-10-05

## 2021-10-05 RX ORDER — LEVOTHYROXINE SODIUM 25 UG/1
TABLET ORAL
Qty: 90 TABLET | Refills: 3 | Status: SHIPPED | OUTPATIENT
Start: 2021-10-05 | End: 2022-10-28

## 2021-11-19 ENCOUNTER — CLINICAL SUPPORT (OUTPATIENT)
Dept: CARDIOLOGY CLINIC | Age: 86
End: 2021-11-19

## 2021-11-19 ENCOUNTER — OFFICE VISIT (OUTPATIENT)
Dept: CARDIOLOGY CLINIC | Age: 86
End: 2021-11-19
Payer: MEDICARE

## 2021-11-19 VITALS
WEIGHT: 98.4 LBS | RESPIRATION RATE: 19 BRPM | HEART RATE: 54 BPM | OXYGEN SATURATION: 100 % | SYSTOLIC BLOOD PRESSURE: 118 MMHG | HEIGHT: 60 IN | DIASTOLIC BLOOD PRESSURE: 52 MMHG | BODY MASS INDEX: 19.32 KG/M2

## 2021-11-19 DIAGNOSIS — R00.1 BRADYCARDIA: Primary | ICD-10-CM

## 2021-11-19 DIAGNOSIS — I10 ESSENTIAL HYPERTENSION: ICD-10-CM

## 2021-11-19 DIAGNOSIS — E78.00 HYPERCHOLESTEREMIA: ICD-10-CM

## 2021-11-19 PROCEDURE — G8752 SYS BP LESS 140: HCPCS | Performed by: NURSE PRACTITIONER

## 2021-11-19 PROCEDURE — G8399 PT W/DXA RESULTS DOCUMENT: HCPCS | Performed by: NURSE PRACTITIONER

## 2021-11-19 PROCEDURE — G8427 DOCREV CUR MEDS BY ELIG CLIN: HCPCS | Performed by: NURSE PRACTITIONER

## 2021-11-19 PROCEDURE — 93225 XTRNL ECG REC<48 HRS REC: CPT | Performed by: INTERNAL MEDICINE

## 2021-11-19 PROCEDURE — G8754 DIAS BP LESS 90: HCPCS | Performed by: NURSE PRACTITIONER

## 2021-11-19 PROCEDURE — G8420 CALC BMI NORM PARAMETERS: HCPCS | Performed by: NURSE PRACTITIONER

## 2021-11-19 PROCEDURE — 93227 XTRNL ECG REC<48 HR R&I: CPT | Performed by: INTERNAL MEDICINE

## 2021-11-19 PROCEDURE — G8536 NO DOC ELDER MAL SCRN: HCPCS | Performed by: NURSE PRACTITIONER

## 2021-11-19 PROCEDURE — G8432 DEP SCR NOT DOC, RNG: HCPCS | Performed by: NURSE PRACTITIONER

## 2021-11-19 PROCEDURE — 1090F PRES/ABSN URINE INCON ASSESS: CPT | Performed by: NURSE PRACTITIONER

## 2021-11-19 PROCEDURE — 99214 OFFICE O/P EST MOD 30 MIN: CPT | Performed by: NURSE PRACTITIONER

## 2021-11-19 PROCEDURE — 93000 ELECTROCARDIOGRAM COMPLETE: CPT | Performed by: NURSE PRACTITIONER

## 2021-11-19 PROCEDURE — 1101F PT FALLS ASSESS-DOCD LE1/YR: CPT | Performed by: NURSE PRACTITIONER

## 2021-11-19 NOTE — PROGRESS NOTES
Chief Complaint   Patient presents with    Holter Monitor     48 hour e-pacth explained and applied. Instrucetd to remove and ship after 48 hours via [employer].

## 2021-11-19 NOTE — PROGRESS NOTES
[address] [facility] @ [address] [facility]– [place], [place]  Subjective/HPI:     Cliff Gillis is a 81 y.o. female with history of hypertension, hyperlipidemia and recurrent dizziness. At last visit I had discontinued nan agent and placed her on amlodipine for hypertension management. She reports her dizziness has resolved. 9/2021 Visit  1. HTN: Mildly hypertensive 146/78. See plan as below  2. High Choesterol: LDL 39 3/2021  3. Recurrent dizziness: Bradycardic on assessment today pulse ox 53 bpm, 56 on EKG. Carotid duplex normal last year, last year heart rate was in the 40s sinus rhythm with intermittent junctional when on diltiazem 240 mg. Will discontinue diltiazem and use amlodipine 5 mg for hypertension management without nan agent control. I will consider repeat Holter monitor off nan agent if she remains bradycardic given previous episode of junctional rhythm last year. Follow-up 1 month for repeat EKG blood pressure check. Interpretation Summary    · Findings are consistent with 16-49% stenosis of the right internal carotid and 16-49% stenosis of the left internal carotid.       PCP Provider  Allen Siu MD  Past Medical History:   Diagnosis Date    GERD (gastroesophageal reflux disease) 2007    EGD- reflux esophagitis    Hypercholesterolemia     Hypertension     Osteopenia 11/15/2011    Pulmonary embolus (HCC)     hx. of PE after surgery    Thyroid disease       Past Surgical History:   Procedure Laterality Date    COLONOSCOPY      armando-internal hemorrhoids    HX GI      bowel obstruction 12-    HX HERNIA REPAIR  5/11/11    HX HYSTERECTOMY      HX ORTHOPAEDIC  2011    toe sx-bilateral    HX SMALL BOWEL RESECTION      4-18-10    [place] FREEING BOWEL ADHESION,ENTEROLYSIS      [place] REPAIR ING HERNIA,5+Y/O,REDUCIBL  5/11/11     Allergies   Allergen Reactions    Hydrochlorothiazide Other (comments)     dizziness      No family history on file. Current Outpatient Medications   Medication Sig    levothyroxine (SYNTHROID) 25 mcg tablet take 1 tablet by mouth once daily for thyroid    amLODIPine (NORVASC) 5 mg tablet TAKE 1 TABLET BY MOUTH EVERY DAY, REPLACE CARDIZEM    indapamide (LOZOL) 1.25 mg tablet take 1 tablet by mouth once daily for blood pressure    atorvastatin (LIPITOR) 20 mg tablet take 1 tablet by mouth daily for cholesterol    alendronate (FOSAMAX) 70 mg tablet take 1 tablet by mouth every week on an empty stomach with 8 ounces of water. For bones    omeprazole (PRILOSEC) 40 mg capsule TAKE ONE CAPSULE BY MOUTH DAILY FOR INDIGESTION    cloNIDine HCL (CATAPRES) 0.2 mg tablet TAKE 1 TABLET BY MOUTH TWICE DAILY FOR BLOOD PRESSURE    DOCUSATE SODIUM (COLACE PO) Take  by mouth daily.  SORE THROAT, BENZOCAINE-MENTH, 15-2.6 mg lozg lozenge as needed.  multivitamins-ca-iron-minerals (ONE DAILY) Tab Take one tablet by mouth once daily. No current facility-administered medications for this visit. There were no vitals filed for this visit. Social History     Socioeconomic History    Marital status:      Spouse name: Not on file    Number of children: Not on file    Years of education: Not on file    Highest education level: Not on file   Occupational History    Not on file   Tobacco Use    Smoking status: Never Smoker    Smokeless tobacco: Never Used   Substance and Sexual Activity    Alcohol use: No    Drug use: No    Sexual activity: Not on file   Other Topics Concern    Not on file   Social History Narrative    Currently lives by herself, brought in by sister. Sister lives 5 minutes away.       Social Determinants of Health     Financial Resource Strain:     Difficulty of Paying Living Expenses: Not on file   Food Insecurity:     Worried About Running Out of Food in the Last Year: Not on file    Alex of Food in the Last Year: Not on file   Transportation Needs:     Lack of Transportation (Medical): Not on file    Lack of Transportation (Non-Medical): Not on file   Physical Activity:     Days of Exercise per Week: Not on file    Minutes of Exercise per Session: Not on file   Stress:     Feeling of Stress : Not on file   Social Connections:     Frequency of Communication with Friends and Family: Not on file    Frequency of Social Gatherings with Friends and Family: Not on file    Attends Jain Services: Not on file    Active Member of [address] [place] [address] [employer] or Organizations: Not on file    Attends Club or Organization Meetings: Not on file    Marital Status: Not on file   Intimate Partner Violence:     Fear of Current or Ex-Partner: Not on file    Emotionally Abused: Not on file    Physically Abused: Not on file    Sexually Abused: Not on file   Housing Stability:     Unable to Pay for Housing in the Last Year: Not on file    Number of [place] in the Last Year: Not on file    Unstable Housing in the Last Year: Not on file       I have reviewed the nurses notes, vitals, problem list, allergy list, medical history, family, social history and medications. Review of Symptoms  11 systems reviewed, negative other than as stated in the HPI      Physical Exam:      General: Thin kyphoscoliotic, in no acute distress, cooperative and alert  HEENT: No carotid bruits, no JVD, trach is midline. Neck Supple,   Heart:  Normal S1/S2 negative S3 or S4. Regular, no murmur, gallop or rub. Respiratory: Clear bilaterally x 4, no wheezing or rales  Abdomen:   Soft, non-tender, no masses, bowel sounds are active. Extremities:  No edema, normal cap refill, no cyanosis, atraumatic. Neuro: A&Ox3, speech clear, gait slow and cautious using rolling walker.    Cardiographics    ECG: Sinus bradycardia        Cardiology Labs:  Lab Results   Component Value Date/Time    Cholesterol, total 144 09/30/2021 10:32 AM    HDL Cholesterol 107 09/30/2021 10:32 AM    LDL, calculated 30.4 09/30/2021 10:32 AM    Triglyceride 33 09/30/2021 10:32 AM    CHOL/HDL Ratio 1.3 09/30/2021 10:32 AM       Lab Results   Component Value Date/Time    Sodium 139 09/30/2021 10:32 AM    Potassium 3.9 09/30/2021 10:32 AM    Chloride 103 09/30/2021 10:32 AM    CO2 29 09/30/2021 10:32 AM    Anion gap 7 09/30/2021 10:32 AM    Glucose 89 09/30/2021 10:32 AM    BUN 22 (H) 09/30/2021 10:32 AM    Creatinine 0.95 09/30/2021 10:32 AM    BUN/Creatinine ratio 23 (H) 09/30/2021 10:32 AM    GFR est AA >60 09/30/2021 10:32 AM    GFR est non-AA 56 (L) 09/30/2021 10:32 AM    Calcium 9.2 09/30/2021 10:32 AM    Bilirubin, total 0.2 09/30/2021 10:32 AM    Alk. phosphatase 72 09/30/2021 10:32 AM    Protein, total 7.0 09/30/2021 10:32 AM    Albumin 3.4 (L) 09/30/2021 10:32 AM    Globulin 3.6 09/30/2021 10:32 AM    A-G Ratio 0.9 (L) 09/30/2021 10:32 AM    ALT (SGPT) 32 09/30/2021 10:32 AM           Assessment:     Assessment:     Diagnoses and all orders for this visit:    1. Essential hypertension    2. Hypercholesteremia        ICD-10-CM ICD-9-CM    1. Essential hypertension  I10 401.9    2. Hypercholesteremia  E78.00 272.0      No orders of the defined types were placed in this encounter. Plan:     16-year-old female presents for follow-up from recurrent dizziness, she is now off nan agents, resting heart rate 54 bpm on ECG. States her dizziness has resolved. Will place 48-hour Holter monitor to evaluate average heart rate and any evidence of sick sinus syndrome. If normal Holter follow-up in 6 months. Blood pressure is controlled    Reza Ross NP      Please note that this dictation was completed with [employer], the [email or web address] voice recognition software. Quite often unanticipated grammatical, syntax, homophones, and other interpretive errors are inadvertently transcribed by the computer software. Please disregard these errors. Please excuse any errors that have escaped final proofreading. Thank you.

## 2021-11-19 NOTE — PROGRESS NOTES
1. Have you been to the ER, urgent care clinic since your last visit? Hospitalized since your last visit? No    2. Have you seen or consulted any other health care providers outside of the [address] [place] [address] since your last visit? Include any pap smears or colon screening. No      Chief Complaint   Patient presents with    Follow-up     4 wk appt.

## 2021-12-09 NOTE — PROGRESS NOTES
Please call patient, Holter monitor looks good. No indication for needing a pacemaker at this time. Continue current medications see her back in 6 months.

## 2021-12-10 ENCOUNTER — TELEPHONE (OUTPATIENT)
Dept: CARDIOLOGY CLINIC | Age: 86
End: 2021-12-10

## 2021-12-10 NOTE — TELEPHONE ENCOUNTER
I called and spoke with Leonela Irby, with patient's permission . Patient's two identifiers verified. This was discussed with Iftikhar Herbert in detailed:  ----- Message from Dillon Rehman NP sent at 12/9/2021  2:55 PM EST -----  \"Please call patient, Holter monitor looks good. No indication for needing a pacemaker at this time. Continue current medications see her back in 6 months. \"

## 2021-12-23 RX ORDER — CLONIDINE HYDROCHLORIDE 0.2 MG/1
TABLET ORAL
Qty: 180 TABLET | Refills: 3 | Status: SHIPPED | OUTPATIENT
Start: 2021-12-23 | End: 2022-04-22

## 2022-01-06 RX ORDER — ATORVASTATIN CALCIUM 20 MG/1
TABLET, FILM COATED ORAL
Qty: 90 TABLET | Refills: 3 | Status: SHIPPED | OUTPATIENT
Start: 2022-01-06

## 2022-01-06 RX ORDER — AMLODIPINE BESYLATE 5 MG/1
TABLET ORAL
Qty: 90 TABLET | Refills: 1 | Status: SHIPPED | OUTPATIENT
Start: 2022-01-06 | End: 2022-05-26

## 2022-02-02 RX ORDER — ALENDRONATE SODIUM 70 MG/1
TABLET ORAL
Qty: 12 TABLET | Refills: 2 | Status: SHIPPED | OUTPATIENT
Start: 2022-02-02 | End: 2022-09-26 | Stop reason: SDUPTHER

## 2022-03-18 PROBLEM — R42 VERTIGO: Status: ACTIVE | Noted: 2018-07-25

## 2022-04-22 ENCOUNTER — OFFICE VISIT (OUTPATIENT)
Dept: FAMILY MEDICINE CLINIC | Age: 87
End: 2022-04-22
Payer: MEDICARE

## 2022-04-22 VITALS
OXYGEN SATURATION: 97 % | HEART RATE: 71 BPM | WEIGHT: 98.6 LBS | TEMPERATURE: 97.8 F | HEIGHT: 61 IN | BODY MASS INDEX: 18.61 KG/M2 | DIASTOLIC BLOOD PRESSURE: 60 MMHG | RESPIRATION RATE: 16 BRPM | SYSTOLIC BLOOD PRESSURE: 168 MMHG

## 2022-04-22 DIAGNOSIS — Z00.00 ENCOUNTER FOR MEDICARE ANNUAL WELLNESS EXAM: Primary | ICD-10-CM

## 2022-04-22 DIAGNOSIS — E78.00 HYPERCHOLESTEROLEMIA: ICD-10-CM

## 2022-04-22 DIAGNOSIS — I10 ESSENTIAL HYPERTENSION: ICD-10-CM

## 2022-04-22 LAB
ALBUMIN SERPL-MCNC: 4 G/DL (ref 3.5–5)
ALBUMIN/GLOB SERPL: 1 {RATIO} (ref 1.1–2.2)
ALP SERPL-CCNC: 87 U/L (ref 45–117)
ALT SERPL-CCNC: 43 U/L (ref 12–78)
ANION GAP SERPL CALC-SCNC: 5 MMOL/L (ref 5–15)
AST SERPL-CCNC: 32 U/L (ref 15–37)
BASOPHILS # BLD: 0 K/UL (ref 0–0.1)
BASOPHILS NFR BLD: 0 % (ref 0–1)
BILIRUB SERPL-MCNC: 0.3 MG/DL (ref 0.2–1)
BUN SERPL-MCNC: 25 MG/DL (ref 6–20)
BUN/CREAT SERPL: 26 (ref 12–20)
CALCIUM SERPL-MCNC: 9.4 MG/DL (ref 8.5–10.1)
CHLORIDE SERPL-SCNC: 106 MMOL/L (ref 97–108)
CHOLEST SERPL-MCNC: 178 MG/DL
CO2 SERPL-SCNC: 28 MMOL/L (ref 21–32)
CREAT SERPL-MCNC: 0.95 MG/DL (ref 0.55–1.02)
DIFFERENTIAL METHOD BLD: NORMAL
EOSINOPHIL # BLD: 0.1 K/UL (ref 0–0.4)
EOSINOPHIL NFR BLD: 1 % (ref 0–7)
ERYTHROCYTE [DISTWIDTH] IN BLOOD BY AUTOMATED COUNT: 14.2 % (ref 11.5–14.5)
GLOBULIN SER CALC-MCNC: 4 G/DL (ref 2–4)
GLUCOSE SERPL-MCNC: 89 MG/DL (ref 65–100)
HCT VFR BLD AUTO: 38.4 % (ref 35–47)
HDLC SERPL-MCNC: 119 MG/DL
HDLC SERPL: 1.5 {RATIO} (ref 0–5)
HGB BLD-MCNC: 11.8 G/DL (ref 11.5–16)
IMM GRANULOCYTES # BLD AUTO: 0 K/UL (ref 0–0.04)
IMM GRANULOCYTES NFR BLD AUTO: 0 % (ref 0–0.5)
LDLC SERPL CALC-MCNC: 48.6 MG/DL (ref 0–100)
LYMPHOCYTES # BLD: 2.2 K/UL (ref 0.8–3.5)
LYMPHOCYTES NFR BLD: 32 % (ref 12–49)
MCH RBC QN AUTO: 27.8 PG (ref 26–34)
MCHC RBC AUTO-ENTMCNC: 30.7 G/DL (ref 30–36.5)
MCV RBC AUTO: 90.6 FL (ref 80–99)
MONOCYTES # BLD: 0.4 K/UL (ref 0–1)
MONOCYTES NFR BLD: 5 % (ref 5–13)
NEUTS SEG # BLD: 4.3 K/UL (ref 1.8–8)
NEUTS SEG NFR BLD: 62 % (ref 32–75)
NRBC # BLD: 0 K/UL (ref 0–0.01)
NRBC BLD-RTO: 0 PER 100 WBC
PLATELET # BLD AUTO: 203 K/UL (ref 150–400)
PMV BLD AUTO: 12.3 FL (ref 8.9–12.9)
POTASSIUM SERPL-SCNC: 4.5 MMOL/L (ref 3.5–5.1)
PROT SERPL-MCNC: 8 G/DL (ref 6.4–8.2)
RBC # BLD AUTO: 4.24 M/UL (ref 3.8–5.2)
SODIUM SERPL-SCNC: 139 MMOL/L (ref 136–145)
TRIGL SERPL-MCNC: 52 MG/DL (ref ?–150)
VLDLC SERPL CALC-MCNC: 10.4 MG/DL
WBC # BLD AUTO: 7 K/UL (ref 3.6–11)

## 2022-04-22 PROCEDURE — 99213 OFFICE O/P EST LOW 20 MIN: CPT | Performed by: FAMILY MEDICINE

## 2022-04-22 PROCEDURE — G8536 NO DOC ELDER MAL SCRN: HCPCS | Performed by: FAMILY MEDICINE

## 2022-04-22 PROCEDURE — G8427 DOCREV CUR MEDS BY ELIG CLIN: HCPCS | Performed by: FAMILY MEDICINE

## 2022-04-22 PROCEDURE — 1090F PRES/ABSN URINE INCON ASSESS: CPT | Performed by: FAMILY MEDICINE

## 2022-04-22 PROCEDURE — G8399 PT W/DXA RESULTS DOCUMENT: HCPCS | Performed by: FAMILY MEDICINE

## 2022-04-22 PROCEDURE — G8420 CALC BMI NORM PARAMETERS: HCPCS | Performed by: FAMILY MEDICINE

## 2022-04-22 PROCEDURE — 1101F PT FALLS ASSESS-DOCD LE1/YR: CPT | Performed by: FAMILY MEDICINE

## 2022-04-22 PROCEDURE — G8753 SYS BP > OR = 140: HCPCS | Performed by: FAMILY MEDICINE

## 2022-04-22 PROCEDURE — G8510 SCR DEP NEG, NO PLAN REQD: HCPCS | Performed by: FAMILY MEDICINE

## 2022-04-22 PROCEDURE — G0439 PPPS, SUBSEQ VISIT: HCPCS | Performed by: FAMILY MEDICINE

## 2022-04-22 PROCEDURE — G8754 DIAS BP LESS 90: HCPCS | Performed by: FAMILY MEDICINE

## 2022-04-22 RX ORDER — OMEPRAZOLE 40 MG/1
CAPSULE, DELAYED RELEASE ORAL
Qty: 90 CAPSULE | Refills: 12 | Status: SHIPPED | OUTPATIENT
Start: 2022-04-22

## 2022-04-22 RX ORDER — CLONIDINE HYDROCHLORIDE 0.3 MG/1
0.3 TABLET ORAL 2 TIMES DAILY
Qty: 189 TABLET | Refills: 3 | Status: SHIPPED | OUTPATIENT
Start: 2022-04-22

## 2022-04-22 NOTE — PROGRESS NOTES
Chief Complaint   Patient presents with   [place] Annual Wellness Visit       1. \"Have you been to the ER, urgent care clinic since your last visit? Hospitalized since your last visit? \" No    2. \"Have you seen or consulted any other health care providers outside of the [address] [place] [address] since your last visit? \" No     3. For patients aged 39-70: Has the patient had a colonoscopy / FIT/ Cologuard? NA - based on age      If the patient is female:    4. For patients aged 41-77: Has the patient had a mammogram within the past 2 years? NA - based on age or sex      11. For patients aged 21-65: Has the patient had a pap smear? NA - based on age or sex    Health Maintenance Due   Topic Date Due    Shingrix Vaccine Age 49> (1 of 2) Never done    Medicare Yearly Exam  03/30/2022     Daughter concerned about patient's hearing.  And medication

## 2022-04-22 NOTE — PROGRESS NOTES
HISTORY OF PRESENT ILLNESS  Alex Munoz is a 81 y.o. female. HPI Pt. Comes in for blood pressure and cholesterol check. Also needs medicare wellness exam. Sees Dr. Uriel Borrego (cardiology). UTD on immunizations. Would want Rene Brown (sister ) to be her mPOA if she became incapacitated. Review of Systems   HENT: Positive for hearing loss (some hearing loss. ). Neurological:        No falls. Uses a walker. Independent in all adls. Memory ok. Psychiatric/Behavioral: Negative for depression. No alcohol. Physical Exam  Vitals and nursing note reviewed. Constitutional:       Appearance: She is well-developed. HENT:      Right Ear: External ear normal.      Left Ear: External ear normal.      Ears:      Comments: Clear tms  Neck:      Thyroid: No thyromegaly. Cardiovascular:      Rate and Rhythm: Normal rate and regular rhythm. Heart sounds: Normal heart sounds. Pulmonary:      Breath sounds: Normal breath sounds. No wheezing. Abdominal:      General: Bowel sounds are normal. There is no distension. Palpations: Abdomen is soft. There is no mass. Tenderness: There is no abdominal tenderness. Lymphadenopathy:      Cervical: No cervical adenopathy. Skin:     Comments: BR- no masses         ASSESSMENT and PLAN  Orders Placed This Encounter    CBC WITH AUTOMATED DIFF    LIPID PANEL    METABOLIC PANEL, COMPREHENSIVE    cloNIDine HCL (CATAPRES) 0.3 mg tablet    omeprazole (PRILOSEC) 40 mg capsule     Diagnoses and all orders for this visit:    1. Encounter for Medicare annual wellness exam    2. Hypercholesterolemia  -     LIPID PANEL; Future    3. Essential hypertension  -     CBC WITH AUTOMATED DIFF; Future  -     METABOLIC PANEL, COMPREHENSIVE; Future    Other orders  -     cloNIDine HCL (CATAPRES) 0.3 mg tablet; Take 1 Tablet by mouth two (2) times a day.  For blood pressure  -     omeprazole (PRILOSEC) 40 mg capsule; TAKE ONE CAPSULE BY MOUTH DAILY as needed  FOR INDIGESTION      Follow-up and Dispositions    · Return in about 4 months (around 8/22/2022).

## 2022-05-25 PROBLEM — R00.1 BRADYCARDIA: Status: ACTIVE | Noted: 2022-05-25

## 2022-05-26 ENCOUNTER — OFFICE VISIT (OUTPATIENT)
Dept: CARDIOLOGY CLINIC | Age: 87
End: 2022-05-26
Payer: MEDICARE

## 2022-05-26 VITALS
DIASTOLIC BLOOD PRESSURE: 69 MMHG | BODY MASS INDEX: 18.61 KG/M2 | WEIGHT: 98.6 LBS | HEIGHT: 61 IN | HEART RATE: 76 BPM | RESPIRATION RATE: 18 BRPM | OXYGEN SATURATION: 100 % | SYSTOLIC BLOOD PRESSURE: 169 MMHG

## 2022-05-26 DIAGNOSIS — I10 ESSENTIAL HYPERTENSION: Primary | ICD-10-CM

## 2022-05-26 DIAGNOSIS — R00.1 BRADYCARDIA: ICD-10-CM

## 2022-05-26 DIAGNOSIS — E78.00 HYPERCHOLESTEREMIA: ICD-10-CM

## 2022-05-26 PROBLEM — N18.30 CHRONIC RENAL DISEASE, STAGE III (HCC): Status: ACTIVE | Noted: 2022-05-26

## 2022-05-26 PROCEDURE — 1090F PRES/ABSN URINE INCON ASSESS: CPT | Performed by: SPECIALIST

## 2022-05-26 PROCEDURE — G8427 DOCREV CUR MEDS BY ELIG CLIN: HCPCS | Performed by: SPECIALIST

## 2022-05-26 PROCEDURE — G8536 NO DOC ELDER MAL SCRN: HCPCS | Performed by: SPECIALIST

## 2022-05-26 PROCEDURE — G8420 CALC BMI NORM PARAMETERS: HCPCS | Performed by: SPECIALIST

## 2022-05-26 PROCEDURE — 1123F ACP DISCUSS/DSCN MKR DOCD: CPT | Performed by: SPECIALIST

## 2022-05-26 PROCEDURE — 1101F PT FALLS ASSESS-DOCD LE1/YR: CPT | Performed by: SPECIALIST

## 2022-05-26 PROCEDURE — 99213 OFFICE O/P EST LOW 20 MIN: CPT | Performed by: SPECIALIST

## 2022-05-26 PROCEDURE — G8510 SCR DEP NEG, NO PLAN REQD: HCPCS | Performed by: SPECIALIST

## 2022-05-26 RX ORDER — AMLODIPINE BESYLATE 10 MG/1
10 TABLET ORAL DAILY
Qty: 90 TABLET | Refills: 1 | Status: SHIPPED | OUTPATIENT
Start: 2022-05-26 | End: 2022-09-12

## 2022-05-26 NOTE — PROGRESS NOTES
HISTORY OF PRESENT ILLNESS  Alex Borja is a 81 y.o. female     SUMMARY:   Problem List  Date Reviewed: 5/26/2022          Codes Class Noted    Respiratory failure ([facility] ICD-10-CM: J96.90  ICD-9-CM: 518.81  4/20/2010        Chronic renal disease, stage III ICD-10-CM: N18.30  ICD-9-CM: 585.3  5/26/2022        Bradycardia ICD-10-CM: R00.1  ICD-9-CM: 427.89  5/25/2022        Vertigo ICD-10-CM: Q35  ICD-9-CM: 780.4  7/25/2018        Palpitations ICD-10-CM: R00.2  ICD-9-CM: 785.1  9/14/2016        Gait instability--uses roller walker ICD-10-CM: R26.81  ICD-9-CM: 781.2  8/24/2016        SBO (small bowel obstruction) ([facility] ICD-10-CM: R49.076  ICD-9-CM: 560.9  3/31/2013        Esophageal mass ICD-10-CM: K22.89  ICD-9-CM: 530.89  3/31/2013        Osteopenia ICD-10-CM: M85.80  ICD-9-CM: 733.90  11/15/2011        Hernia of unspecified site of abdominal cavity without mention of obstruction or gangrene ICD-10-CM: K46.9  ICD-9-CM: 553.9  5/12/2011        Essential hypertension ICD-10-CM: I10  ICD-9-CM: 401.9  3/1/2010        Hypercholesteremia ICD-10-CM: E78.00  ICD-9-CM: 272.0  3/1/2010        Pulmonary embolus ([facility] ICD-10-CM: I26.99  ICD-9-CM: 415.19  3/1/2010    Overview Signed 3/1/2010  3:47 PM by Adri Tomlin LPN     History of PE after surgery             GERD (gastroesophageal reflux disease) ICD-10-CM: K21.9  ICD-9-CM: 530.81  Unknown    Overview Signed 3/1/2010  4:23 PM by Suellyn Nabor, LPN     EGD- Reflux esophagitis                   Current Outpatient Medications on File Prior to Visit   Medication Sig    cloNIDine HCL (CATAPRES) 0.3 mg tablet Take 1 Tablet by mouth two (2) times a day. For blood pressure    omeprazole (PRILOSEC) 40 mg capsule TAKE ONE CAPSULE BY MOUTH DAILY as needed  FOR INDIGESTION    alendronate (FOSAMAX) 70 mg tablet TAKE 1 TABLET BY MOUTH EVERY WEEK ON AN EMPTY STOMACH WITH 8 OZ OF WATER. THIS IS FOR YOUR BONES.     amLODIPine (NORVASC) 5 mg tablet TAKE 1 TABLET BY MOUTH EVERY DAY, REPLACE CARDIZEM    atorvastatin (LIPITOR) 20 mg tablet TAKE 1 TABLET BY MOUTH DAILY FOR CHOLESTEROL    levothyroxine (SYNTHROID) 25 mcg tablet take 1 tablet by mouth once daily for thyroid    indapamide (LOZOL) 1.25 mg tablet take 1 tablet by mouth once daily for blood pressure    DOCUSATE SODIUM (COLACE PO) Take  by mouth two (2) times a day.  SORE THROAT, BENZOCAINE-MENTH, 15-2.6 mg lozg lozenge as needed.  multivitamins-ca-iron-minerals (ONE DAILY) Tab Take one tablet by mouth once daily.  APPLE CIDER VINEGAR PO Take  by mouth. (Patient not taking: Reported on 5/26/2022)     No current facility-administered medications on file prior to visit. CARDIOLOGY STUDIES TO DATE:  8/16 echo normal lvef, lae, mild tr without pul htn    12/21 holter, occ pac, pvc. Slowest hr 41 bpm, no pauses    Chief Complaint   Patient presents with    Follow-up     6 mo appt. HPI :  She is doing well with no cardiac complaints though her blood pressure is still not well controlled. And her last year she was switched from Cardizem to amlodipine because she had some bradycardia. She is taking all her medications as prescribed according to her daughter who is with her today. She ambulates with a wheeled walker and has not had any falls. CARDIAC ROS:   negative for chest pain, dyspnea, palpitations, syncope, orthopnea, paroxysmal nocturnal dyspnea, exertional chest pressure/discomfort, claudication, lower extremity edema    No family history on file. Past Medical History:   Diagnosis Date    GERD (gastroesophageal reflux disease) 2007    EGD- reflux esophagitis    Hypercholesterolemia     Hypertension     Osteopenia 11/15/2011    Pulmonary embolus (HCC)     hx. of PE after surgery    Thyroid disease        GENERAL ROS:  A comprehensive review of systems was negative except for that written in the HPI.     Visit Vitals  BP (!) 169/69 (BP 1 Location: Left upper arm, BP Patient Position: Sitting, BP Cuff Size: Large adult)   Pulse 76   Resp 18   Ht 5' 1\" (1.549 m)   Wt 98 lb 9.6 oz (44.7 kg)   SpO2 100%   BMI 18.63 kg/m²       Wt Readings from Last 3 Encounters:   05/26/22 98 lb 9.6 oz (44.7 kg)   04/22/22 98 lb 9.6 oz (44.7 kg)   11/19/21 98 lb 6.4 oz (44.6 kg)            BP Readings from Last 3 Encounters:   05/26/22 (!) 169/69   04/22/22 (!) 168/60   11/19/21 (!) (P) 124/57       PHYSICAL EXAM  General appearance: alert, cooperative, no distress, appears stated age  Neurologic: Alert and oriented X 3  Neck: supple, symmetrical, trachea midline, no adenopathy, no carotid bruit and no JVD  Lungs: clear to auscultation bilaterally  Heart: regular rate and rhythm, S1, S2 normal, no murmur, click, rub or gallop  Extremities: extremities normal, atraumatic, no cyanosis or edema    Lab Results   Component Value Date/Time    Cholesterol, total 178 04/22/2022 10:57 AM    Cholesterol, total 144 09/30/2021 10:32 AM    Cholesterol, total 175 03/29/2021 01:52 PM    Cholesterol, total 235 (H) 09/26/2019 12:53 PM    Cholesterol, total 215 (H) 03/26/2019 11:52 AM    HDL Cholesterol 119 04/22/2022 10:57 AM    HDL Cholesterol 107 09/30/2021 10:32 AM    HDL Cholesterol 123 03/29/2021 01:52 PM    HDL Cholesterol 132 09/26/2019 12:53 PM    HDL Cholesterol 111 03/26/2019 11:52 AM    LDL, calculated 48.6 04/22/2022 10:57 AM    LDL, calculated 30.4 09/30/2021 10:32 AM    LDL, calculated 39.2 03/29/2021 01:52 PM    LDL, calculated 91 09/26/2019 12:53 PM    LDL, calculated 90 03/26/2019 11:52 AM    Triglyceride 52 04/22/2022 10:57 AM    Triglyceride 33 09/30/2021 10:32 AM    Triglyceride 64 03/29/2021 01:52 PM    Triglyceride 62 09/26/2019 12:53 PM    Triglyceride 72 03/26/2019 11:52 AM    CHOL/HDL Ratio 1.5 04/22/2022 10:57 AM    CHOL/HDL Ratio 1.3 09/30/2021 10:32 AM    CHOL/HDL Ratio 1.4 03/29/2021 01:52 PM    CHOL/HDL Ratio 2.5 01/30/2009 03:11 PM     ASSESSMENT :      She is stable and asymptomatic, well compensated on a good medical regimen and needs no cardiac testing at this time. I am going to increase her amlodipine to 10 mg and she will double up on her current meds until they run out and refill new prescription  current treatment plan is effective, no change in therapy  lab results and schedule of future lab studies reviewed with patient  reviewed diet, exercise and weight control    Encounter Diagnoses   Name Primary?  Essential hypertension Yes    Bradycardia     Hypercholesteremia      No orders of the defined types were placed in this encounter. Follow-up and Dispositions    · Return in about 6 months (around 11/26/2022). Uziel Reyes MD  5/26/2022  Please note that this dictation was completed with [employer], the computer voice recognition software. Quite often unanticipated grammatical, syntax, homophones, and other interpretive errors are inadvertently transcribed by the computer software. Please disregard these errors. Please excuse any errors that have escaped final proofreading. Thank you.

## 2022-05-26 NOTE — PROGRESS NOTES
1. Have you been to the ER, urgent care clinic since your last visit? Hospitalized since your last visit? No    2. Have you seen or consulted any other health care providers outside of the [address] [place] [address] since your last visit? Include any pap smears or colon screening. No       Chief Complaint   Patient presents with    Follow-up     6 mo appt.

## 2022-07-14 RX ORDER — INDAPAMIDE 1.25 MG/1
TABLET, FILM COATED ORAL
Qty: 90 TABLET | Refills: 3 | Status: SHIPPED | OUTPATIENT
Start: 2022-07-14

## 2022-09-26 ENCOUNTER — OFFICE VISIT (OUTPATIENT)
Dept: FAMILY MEDICINE CLINIC | Age: 87
End: 2022-09-26
Payer: MEDICARE

## 2022-09-26 VITALS
HEART RATE: 64 BPM | DIASTOLIC BLOOD PRESSURE: 60 MMHG | HEIGHT: 61 IN | OXYGEN SATURATION: 100 % | WEIGHT: 99.4 LBS | RESPIRATION RATE: 16 BRPM | TEMPERATURE: 97.8 F | SYSTOLIC BLOOD PRESSURE: 149 MMHG | BODY MASS INDEX: 18.77 KG/M2

## 2022-09-26 DIAGNOSIS — Z23 NEEDS FLU SHOT: ICD-10-CM

## 2022-09-26 DIAGNOSIS — R60.9 EDEMA, UNSPECIFIED TYPE: ICD-10-CM

## 2022-09-26 DIAGNOSIS — E03.9 ACQUIRED HYPOTHYROIDISM: ICD-10-CM

## 2022-09-26 DIAGNOSIS — E78.00 HYPERCHOLESTEROLEMIA: ICD-10-CM

## 2022-09-26 DIAGNOSIS — I10 ESSENTIAL HYPERTENSION: Primary | ICD-10-CM

## 2022-09-26 PROCEDURE — 1090F PRES/ABSN URINE INCON ASSESS: CPT | Performed by: FAMILY MEDICINE

## 2022-09-26 PROCEDURE — G8427 DOCREV CUR MEDS BY ELIG CLIN: HCPCS | Performed by: FAMILY MEDICINE

## 2022-09-26 PROCEDURE — G8420 CALC BMI NORM PARAMETERS: HCPCS | Performed by: FAMILY MEDICINE

## 2022-09-26 PROCEDURE — 90694 VACC AIIV4 NO PRSRV 0.5ML IM: CPT | Performed by: FAMILY MEDICINE

## 2022-09-26 PROCEDURE — G0008 ADMIN INFLUENZA VIRUS VAC: HCPCS | Performed by: FAMILY MEDICINE

## 2022-09-26 PROCEDURE — 99214 OFFICE O/P EST MOD 30 MIN: CPT | Performed by: FAMILY MEDICINE

## 2022-09-26 PROCEDURE — G8510 SCR DEP NEG, NO PLAN REQD: HCPCS | Performed by: FAMILY MEDICINE

## 2022-09-26 PROCEDURE — G8536 NO DOC ELDER MAL SCRN: HCPCS | Performed by: FAMILY MEDICINE

## 2022-09-26 PROCEDURE — 1101F PT FALLS ASSESS-DOCD LE1/YR: CPT | Performed by: FAMILY MEDICINE

## 2022-09-26 PROCEDURE — 1123F ACP DISCUSS/DSCN MKR DOCD: CPT | Performed by: FAMILY MEDICINE

## 2022-09-26 RX ORDER — AMLODIPINE BESYLATE 5 MG/1
5 TABLET ORAL DAILY
Qty: 90 TABLET | Refills: 3 | Status: SHIPPED | OUTPATIENT
Start: 2022-09-26

## 2022-09-26 RX ORDER — FUROSEMIDE 20 MG/1
TABLET ORAL
Qty: 30 TABLET | Refills: 11 | Status: SHIPPED | OUTPATIENT
Start: 2022-09-26

## 2022-09-26 RX ORDER — ALENDRONATE SODIUM 70 MG/1
TABLET ORAL
Qty: 12 TABLET | Refills: 2 | Status: SHIPPED | OUTPATIENT
Start: 2022-09-26

## 2022-09-26 NOTE — PROGRESS NOTES
HISTORY OF PRESENT ILLNESS  Alex Gallardo is a 81 y.o. female. HPI In for blood pressure and cholesterol check. Has gained one lb since last seen. No chest pains, dyspnea. Needs refill of alendronate. Some swelling in legs. Needs thyroid checked also  ROS    Physical Exam  Vitals and nursing note reviewed. Constitutional:       Appearance: She is well-developed. HENT:      Right Ear: External ear normal.      Left Ear: External ear normal.   Neck:      Thyroid: No thyromegaly. Cardiovascular:      Rate and Rhythm: Normal rate and regular rhythm. Heart sounds: Normal heart sounds. Pulmonary:      Breath sounds: Normal breath sounds. No wheezing. Abdominal:      General: Bowel sounds are normal. There is no distension. Palpations: Abdomen is soft. There is no mass. Tenderness: There is no abdominal tenderness. Musculoskeletal:      Comments: 1+ edemabilaterally   Lymphadenopathy:      Cervical: No cervical adenopathy. ASSESSMENT and PLAN  Orders Placed This Encounter    Influenza Vaccine, QUAD, 65 Yrs +  IM  (Fluad 59752 )    CBC WITH AUTOMATED DIFF    METABOLIC PANEL, COMPREHENSIVE    LIPID PANEL    URINALYSIS W/MICROSCOPIC    TSH 3RD GENERATION    T4, FREE    alendronate (FOSAMAX) 70 mg tablet    amLODIPine (NORVASC) 5 mg tablet    furosemide (LASIX) 20 mg tablet     Diagnoses and all orders for this visit:    1. Essential hypertension  -     CBC WITH AUTOMATED DIFF; Future  -     METABOLIC PANEL, COMPREHENSIVE; Future    2. Needs flu shot  -     INFLUENZA, FLUAD, (AGE 65 Y+), IM, PF, 0.5 ML    3. Acquired hypothyroidism  -     TSH 3RD GENERATION; Future  -     T4, FREE; Future    4. Hypercholesterolemia  -     LIPID PANEL; Future    5. Edema, unspecified type  -     URINALYSIS W/MICROSCOPIC; Future    Other orders  -     alendronate (FOSAMAX) 70 mg tablet; TAKE 1 TABLET BY MOUTH EVERY WEEK ON AN EMPTY STOMACH WITH 8 OZ OF WATER. THIS IS FOR YOUR BONES.   -     amLODIPine (NORVASC) 5 mg tablet; Take 1 Tablet by mouth daily. For blood pressure  -     furosemide (LASIX) 20 mg tablet; One po daily prn swelling in legs. Follow-up and Dispositions    Return in about 6 months (around 3/26/2023).

## 2022-09-26 NOTE — PATIENT INSTRUCTIONS
Vaccine Information Statement    Influenza (Flu) Vaccine (Inactivated or Recombinant): What You Need to Know    Many vaccine information statements are available in Vietnamese and other languages. See www.immunize.org/vis. Hojas de información sobre vacunas están disponibles en español y en muchos otros idiomas. Visite www.immunize.org/vis. 1. Why get vaccinated? Influenza vaccine can prevent influenza (flu). Flu is a contagious disease that spreads around the [facility] every year, usually between October and May. Anyone can get the flu, but it is more dangerous for some people. Infants and young children, people 72 years and older, pregnant people, and people with certain health conditions or a weakened immune system are at greatest risk of flu complications. Pneumonia, bronchitis, sinus infections, and ear infections are examples of flu-related complications. If you have a medical condition, such as heart disease, cancer, or diabetes, flu can make it worse. Flu can cause fever and chills, sore throat, muscle aches, fatigue, cough, headache, and runny or stuffy nose. Some people may have vomiting and diarrhea, though this is more common in children than adults. In an average year, thousands of people in the [facility] die from flu, and many more are hospitalized. Flu vaccine prevents millions of illnesses and flu-related visits to the doctor each year. 2. Influenza vaccines     CDC recommends everyone 6 months and older get vaccinated every flu season. Children 6 months through 6years of age may need 2 doses during a single flu season. Everyone else needs only 1 dose each flu season. It takes about 2 weeks for protection to develop after vaccination. There are many flu viruses, and they are always changing. Each year a new flu vaccine is made to protect against the influenza viruses believed to be likely to cause disease in the upcoming flu season.  Even when the vaccine doesnt exactly match these viruses, it may still provide some protection. Influenza vaccine does not cause flu. Influenza vaccine may be given at the same time as other vaccines. 3. Talk with your health care provider    Tell your vaccination provider if the person getting the vaccine:  Has had an allergic reaction after a previous dose of influenza vaccine, or has any severe, life-threatening allergies   Has ever had Guillain-Barré Syndrome (also called GBS)    In some cases, your health care provider may decide to postpone influenza vaccination until a future visit. Influenza vaccine can be administered at any time during pregnancy. People who are or will be pregnant during influenza season should receive inactivated influenza vaccine. People with minor illnesses, such as a cold, may be vaccinated. People who are moderately or severely ill should usually wait until they recover before getting influenza vaccine. Your health care provider can give you more information. 4. Risks of a vaccine reaction    Soreness, redness, and swelling where the shot is given, fever, muscle aches, and headache can happen after influenza vaccination. There may be a very small increased risk of Guillain-Barré Syndrome (GBS) after inactivated influenza vaccine (the flu shot). [place] Fulling children who get the flu shot along with pneumococcal vaccine (PCV13) and/or DTaP vaccine at the same time might be slightly more likely to have a seizure caused by fever. Tell your health care provider if a child who is getting flu vaccine has ever had a seizure. People sometimes faint after medical procedures, including vaccination. Tell your provider if you feel dizzy or have vision changes or ringing in the ears. As with any medicine, there is a very remote chance of a vaccine causing a severe allergic reaction, other serious injury, or death. 5. What if there is a serious problem?     An allergic reaction could occur after the vaccinated person leaves the clinic. If you see signs of a severe allergic reaction (hives, swelling of the face and throat, difficulty breathing, a fast heartbeat, dizziness, or weakness), call 9-1-1 and get the person to the nearest hospital.    For other signs that concern you, call your health care provider. Adverse reactions should be reported to the Vaccine Adverse Event Reporting System (VAERS). Your health care provider will usually file this report, or you can do it yourself. Visit the VAERS website at www.vaers. [facility].gov or call 3-582.162.6385. VAERS is only for reporting reactions, and VAERS staff members do not give medical advice. 6. The National Vaccine Injury Compensation Program    The [facility] Vaccine Injury Compensation Program (VICP) is a federal program that was created to compensate people who may have been injured by certain vaccines. Claims regarding alleged injury or death due to vaccination have a time limit for filing, which may be as short as two years. Visit the VICP website at www.[facility].gov/vaccinecompensation or call 0-405.592.3459 to learn about the program and about filing a claim. 7. How can I learn more? Ask your health care provider. Call your local or state health department. Visit the website of the Food and Drug Administration (FDA) for vaccine package inserts and additional information at www.fda.gov/vaccines-blood-biologics/vaccines. Contact the Centers for Disease Control and Prevention (CDC): Call 0-291.361.9753 (1-800-CDC-INFO) or  Visit CDCs influenza website at www.cdc.gov/flu. Vaccine Information Statement   Inactivated Influenza Vaccine   8/6/2021  42 EVA Bright [id number]   Department of Health and Human Services  Centers for Disease Control and Prevention    Office Use Only

## 2022-09-26 NOTE — PROGRESS NOTES
Chief Complaint   Patient presents with    Hypertension    Follow Up Chronic Condition       1. \"Have you been to the ER, urgent care clinic since your last visit? Hospitalized since your last visit? \" No    2. \"Have you seen or consulted any other health care providers outside of the [address] [place] [address] since your last visit? \" Yes Dr. Catrina Parmar - cardiology and podiatry - Dr. Susy Benjamin      3. For patients aged 39-70: Has the patient had a colonoscopy / FIT/ Cologuard? NA - based on age      If the patient is female:    4. For patients aged 41-77: Has the patient had a mammogram within the past 2 years? NA - based on age or sex      11. For patients aged 21-65: Has the patient had a pap smear?  NA - based on age or sex    Health Maintenance Due   Topic Date Due    Shingrix Vaccine Age 49> (1 of 2) Never done    COVID-19 Vaccine (4 - Booster for Pfizer series) 06/19/2022    Flu Vaccine (1) 08/01/2022

## 2022-09-27 LAB
ALBUMIN SERPL-MCNC: 3.9 G/DL (ref 3.5–5)
ALBUMIN/GLOB SERPL: 1 {RATIO} (ref 1.1–2.2)
ALP SERPL-CCNC: 78 U/L (ref 45–117)
ALT SERPL-CCNC: 40 U/L (ref 12–78)
ANION GAP SERPL CALC-SCNC: 5 MMOL/L (ref 5–15)
APPEARANCE UR: CLEAR
AST SERPL-CCNC: 31 U/L (ref 15–37)
BACTERIA URNS QL MICRO: NEGATIVE /HPF
BASOPHILS # BLD: 0 K/UL (ref 0–0.1)
BASOPHILS NFR BLD: 1 % (ref 0–1)
BILIRUB SERPL-MCNC: 0.3 MG/DL (ref 0.2–1)
BILIRUB UR QL: NEGATIVE
BUN SERPL-MCNC: 30 MG/DL (ref 6–20)
BUN/CREAT SERPL: 27 (ref 12–20)
CALCIUM SERPL-MCNC: 9.4 MG/DL (ref 8.5–10.1)
CHLORIDE SERPL-SCNC: 106 MMOL/L (ref 97–108)
CHOLEST SERPL-MCNC: 183 MG/DL
CO2 SERPL-SCNC: 28 MMOL/L (ref 21–32)
COLOR UR: NORMAL
CREAT SERPL-MCNC: 1.1 MG/DL (ref 0.55–1.02)
DIFFERENTIAL METHOD BLD: NORMAL
EOSINOPHIL # BLD: 0.1 K/UL (ref 0–0.4)
EOSINOPHIL NFR BLD: 2 % (ref 0–7)
EPITH CASTS URNS QL MICRO: NORMAL /LPF
ERYTHROCYTE [DISTWIDTH] IN BLOOD BY AUTOMATED COUNT: 14.1 % (ref 11.5–14.5)
GLOBULIN SER CALC-MCNC: 3.9 G/DL (ref 2–4)
GLUCOSE SERPL-MCNC: 94 MG/DL (ref 65–100)
GLUCOSE UR STRIP.AUTO-MCNC: NEGATIVE MG/DL
HCT VFR BLD AUTO: 38.1 % (ref 35–47)
HDLC SERPL-MCNC: 117 MG/DL
HDLC SERPL: 1.6 {RATIO} (ref 0–5)
HGB BLD-MCNC: 11.6 G/DL (ref 11.5–16)
HGB UR QL STRIP: NEGATIVE
HYALINE CASTS URNS QL MICRO: NORMAL /LPF (ref 0–5)
IMM GRANULOCYTES # BLD AUTO: 0 K/UL (ref 0–0.04)
IMM GRANULOCYTES NFR BLD AUTO: 0 % (ref 0–0.5)
KETONES UR QL STRIP.AUTO: NEGATIVE MG/DL
LDLC SERPL CALC-MCNC: 54.6 MG/DL (ref 0–100)
LEUKOCYTE ESTERASE UR QL STRIP.AUTO: NEGATIVE
LYMPHOCYTES # BLD: 2.5 K/UL (ref 0.8–3.5)
LYMPHOCYTES NFR BLD: 37 % (ref 12–49)
MCH RBC QN AUTO: 27.8 PG (ref 26–34)
MCHC RBC AUTO-ENTMCNC: 30.4 G/DL (ref 30–36.5)
MCV RBC AUTO: 91.4 FL (ref 80–99)
MONOCYTES # BLD: 0.4 K/UL (ref 0–1)
MONOCYTES NFR BLD: 6 % (ref 5–13)
NEUTS SEG # BLD: 3.8 K/UL (ref 1.8–8)
NEUTS SEG NFR BLD: 54 % (ref 32–75)
NITRITE UR QL STRIP.AUTO: NEGATIVE
NRBC # BLD: 0 K/UL (ref 0–0.01)
NRBC BLD-RTO: 0 PER 100 WBC
PH UR STRIP: 5 [PH] (ref 5–8)
PLATELET # BLD AUTO: 193 K/UL (ref 150–400)
PMV BLD AUTO: 12 FL (ref 8.9–12.9)
POTASSIUM SERPL-SCNC: 4.1 MMOL/L (ref 3.5–5.1)
PROT SERPL-MCNC: 7.8 G/DL (ref 6.4–8.2)
PROT UR STRIP-MCNC: NEGATIVE MG/DL
RBC # BLD AUTO: 4.17 M/UL (ref 3.8–5.2)
RBC #/AREA URNS HPF: NORMAL /HPF (ref 0–5)
SODIUM SERPL-SCNC: 139 MMOL/L (ref 136–145)
SP GR UR REFRACTOMETRY: 1.01 (ref 1–1.03)
T4 FREE SERPL-MCNC: 0.9 NG/DL (ref 0.8–1.5)
TRIGL SERPL-MCNC: 57 MG/DL (ref ?–150)
TSH SERPL DL<=0.05 MIU/L-ACNC: 0.74 UIU/ML (ref 0.36–3.74)
UROBILINOGEN UR QL STRIP.AUTO: 0.2 EU/DL (ref 0.2–1)
VLDLC SERPL CALC-MCNC: 11.4 MG/DL
WBC # BLD AUTO: 6.9 K/UL (ref 3.6–11)
WBC URNS QL MICRO: NORMAL /HPF (ref 0–4)

## 2022-10-28 RX ORDER — LEVOTHYROXINE SODIUM 25 UG/1
TABLET ORAL
Qty: 90 TABLET | Refills: 3 | Status: SHIPPED | OUTPATIENT
Start: 2022-10-28

## 2023-01-09 RX ORDER — ATORVASTATIN CALCIUM 20 MG/1
TABLET, FILM COATED ORAL
Qty: 90 TABLET | Refills: 3 | Status: SHIPPED | OUTPATIENT
Start: 2023-01-09

## 2023-01-26 ENCOUNTER — OFFICE VISIT (OUTPATIENT)
Dept: CARDIOLOGY CLINIC | Age: 88
End: 2023-01-26
Payer: MEDICARE

## 2023-01-26 VITALS
HEART RATE: 80 BPM | HEIGHT: 60 IN | RESPIRATION RATE: 18 BRPM | BODY MASS INDEX: 19.41 KG/M2 | SYSTOLIC BLOOD PRESSURE: 102 MMHG | DIASTOLIC BLOOD PRESSURE: 62 MMHG | OXYGEN SATURATION: 100 %

## 2023-01-26 DIAGNOSIS — E78.00 HYPERCHOLESTEREMIA: ICD-10-CM

## 2023-01-26 DIAGNOSIS — I10 ESSENTIAL HYPERTENSION: Primary | ICD-10-CM

## 2023-01-26 DIAGNOSIS — R00.2 PALPITATIONS: ICD-10-CM

## 2023-01-26 PROCEDURE — 1101F PT FALLS ASSESS-DOCD LE1/YR: CPT | Performed by: SPECIALIST

## 2023-01-26 PROCEDURE — 1090F PRES/ABSN URINE INCON ASSESS: CPT | Performed by: SPECIALIST

## 2023-01-26 PROCEDURE — G8510 SCR DEP NEG, NO PLAN REQD: HCPCS | Performed by: SPECIALIST

## 2023-01-26 PROCEDURE — G8536 NO DOC ELDER MAL SCRN: HCPCS | Performed by: SPECIALIST

## 2023-01-26 PROCEDURE — 99213 OFFICE O/P EST LOW 20 MIN: CPT | Performed by: SPECIALIST

## 2023-01-26 PROCEDURE — 1123F ACP DISCUSS/DSCN MKR DOCD: CPT | Performed by: SPECIALIST

## 2023-01-26 PROCEDURE — G8420 CALC BMI NORM PARAMETERS: HCPCS | Performed by: SPECIALIST

## 2023-01-26 PROCEDURE — G8427 DOCREV CUR MEDS BY ELIG CLIN: HCPCS | Performed by: SPECIALIST

## 2023-01-26 RX ORDER — AMLODIPINE BESYLATE 10 MG/1
10 TABLET ORAL DAILY
COMMUNITY

## 2023-01-26 NOTE — PROGRESS NOTES
HISTORY OF PRESENT ILLNESS  Alex Avendaño is a 81 y.o. female     SUMMARY:   Problem List  Date Reviewed: 1/26/2023            Codes Class Noted    Respiratory failure ([facility].) ICD-10-CM: J96.90  ICD-9-CM: 518.81  4/20/2010        Chronic renal disease, stage III ICD-10-CM: N18.30  ICD-9-CM: 585.3  5/26/2022        Bradycardia ICD-10-CM: R00.1  ICD-9-CM: 427.89  5/25/2022        Vertigo ICD-10-CM: A32  ICD-9-CM: 780.4  7/25/2018        Palpitations ICD-10-CM: R00.2  ICD-9-CM: 785.1  9/14/2016        Gait instability--uses roller walker ICD-10-CM: R26.81  ICD-9-CM: 781.2  8/24/2016        SBO (small bowel obstruction) ([facility] ICD-10-CM: X82.183  ICD-9-CM: 560.9  3/31/2013        Esophageal mass ICD-10-CM: K22.89  ICD-9-CM: 530.89  3/31/2013        Osteopenia ICD-10-CM: M85.80  ICD-9-CM: 733.90  11/15/2011        Hernia of unspecified site of abdominal cavity without mention of obstruction or gangrene ICD-10-CM: K46.9  ICD-9-CM: 553.9  5/12/2011        Essential hypertension ICD-10-CM: I10  ICD-9-CM: 401.9  3/1/2010        Hypercholesteremia ICD-10-CM: E78.00  ICD-9-CM: 272.0  3/1/2010        Pulmonary embolus ([place] [address].) ICD-10-CM: I26.99  ICD-9-CM: 415.19  3/1/2010    Overview Signed 3/1/2010  3:47 PM by Klaus Hartley LPN     History of PE after surgery             GERD (gastroesophageal reflux disease) ICD-10-CM: K21.9  ICD-9-CM: 530.81  Unknown    Overview Signed 3/1/2010  4:23 PM by Klaus Divine, LPN     EGD- Reflux esophagitis                Current Outpatient Medications on File Prior to Visit   Medication Sig    amLODIPine (NORVASC) 10 mg tablet Take 10 mg by mouth daily. atorvastatin (LIPITOR) 20 mg tablet TAKE 1 TABLET BY MOUTH DAILY FOR CHOLESTEROL    levothyroxine (SYNTHROID) 25 mcg tablet TAKE 1 TABLET BY MOUTH EVERY DAY FOR THYROID    alendronate (FOSAMAX) 70 mg tablet TAKE 1 TABLET BY MOUTH EVERY WEEK ON AN EMPTY STOMACH WITH 8 OZ OF WATER. THIS IS FOR YOUR BONES.     furosemide (LASIX) 20 mg tablet One po daily prn swelling in legs. indapamide (LOZOL) 1.25 mg tablet TAKE 1 TABLET BY MOUTH EVERY DAY FOR BLOOD PRESSURE    APPLE CIDER VINEGAR PO Take 1 Gum by mouth daily. cloNIDine HCL (CATAPRES) 0.3 mg tablet Take 1 Tablet by mouth two (2) times a day. For blood pressure    omeprazole (PRILOSEC) 40 mg capsule TAKE ONE CAPSULE BY MOUTH DAILY as needed  FOR INDIGESTION    DOCUSATE SODIUM (COLACE PO) Take  by mouth two (2) times a day. SORE THROAT, BENZOCAINE-MENTH, 15-2.6 mg lozg lozenge as needed. multivitamins-ca-iron-minerals (ONE DAILY) Tab Take one tablet by mouth once daily. No current facility-administered medications on file prior to visit. CARDIOLOGY STUDIES TO DATE:  8/16 echo normal lvef, lae, mild tr without pul htn    12/21 holter, occ pac, pvc. Slowest hr 41 bpm, no pauses    Chief Complaint   Patient presents with    Follow-up     6 mo appt. HPI :  She continues to do remarkably well. She lives in her own apartment but unfortunately she sits a lot so she has intermittent problems with dependent edema for which she takes as needed Lasix. Blood pressures well controlled and her recent blood work looked excellent. CARDIAC ROS:   negative for chest pain, dyspnea, palpitations, syncope, orthopnea, paroxysmal nocturnal dyspnea, exertional chest pressure/discomfort, claudication    No family history on file. Past Medical History:   Diagnosis Date    GERD (gastroesophageal reflux disease) 2007    EGD- reflux esophagitis    Hypercholesterolemia     Hypertension     Osteopenia 11/15/2011    Pulmonary embolus (HCC)     hx. of PE after surgery    Thyroid disease        GENERAL ROS:  A comprehensive review of systems was negative except for that written in the HPI.     Visit Vitals  /62 (BP 1 Location: Right upper arm, BP Patient Position: Sitting, BP Cuff Size: Adult)   Pulse 80   Resp 18   Ht 5' (1.524 m)   SpO2 100%   BMI 19.41 kg/m²       Wt Readings from Last 3 Encounters:   09/26/22 99 lb 6.4 oz (45.1 kg)   05/26/22 98 lb 9.6 oz (44.7 kg)   04/22/22 98 lb 9.6 oz (44.7 kg)            BP Readings from Last 3 Encounters:   01/26/23 102/62   09/26/22 (!) 149/60   05/26/22 (!) 169/69       PHYSICAL EXAM  General appearance: alert, cooperative, no distress, appears stated age  Neurologic: Alert and oriented X 3  Neck: supple, symmetrical, trachea midline, no adenopathy, no carotid bruit, and no JVD  Lungs: clear to auscultation bilaterally  Heart: regular rate and rhythm, S1, S2 normal, no murmur, click, rub or gallop  Extremities: edema tr    Lab Results   Component Value Date/Time    Cholesterol, total 183 09/26/2022 03:30 PM    Cholesterol, total 178 04/22/2022 10:57 AM    Cholesterol, total 144 09/30/2021 10:32 AM    Cholesterol, total 175 03/29/2021 01:52 PM    Cholesterol, total 235 (H) 09/26/2019 12:53 PM    HDL Cholesterol 117 09/26/2022 03:30 PM    HDL Cholesterol 119 04/22/2022 10:57 AM    HDL Cholesterol 107 09/30/2021 10:32 AM    HDL Cholesterol 123 03/29/2021 01:52 PM    HDL Cholesterol 132 09/26/2019 12:53 PM    LDL, calculated 54.6 09/26/2022 03:30 PM    LDL, calculated 48.6 04/22/2022 10:57 AM    LDL, calculated 30.4 09/30/2021 10:32 AM    LDL, calculated 39.2 03/29/2021 01:52 PM    LDL, calculated 91 09/26/2019 12:53 PM    Triglyceride 57 09/26/2022 03:30 PM    Triglyceride 52 04/22/2022 10:57 AM    Triglyceride 33 09/30/2021 10:32 AM    Triglyceride 64 03/29/2021 01:52 PM    Triglyceride 62 09/26/2019 12:53 PM    CHOL/HDL Ratio 1.6 09/26/2022 03:30 PM    CHOL/HDL Ratio 1.5 04/22/2022 10:57 AM    CHOL/HDL Ratio 1.3 09/30/2021 10:32 AM    CHOL/HDL Ratio 1.4 03/29/2021 01:52 PM    CHOL/HDL Ratio 2.5 01/30/2009 03:11 PM     ASSESSMENT :      She is stable and asymptomatic, well compensated on a good medical regimen and needs no cardiac testing at this time.   current treatment plan is effective, no change in therapy  lab results and schedule of future lab studies reviewed with patient  reviewed diet, exercise and weight control    Encounter Diagnoses   Name Primary? Essential hypertension Yes    Hypercholesteremia     Palpitations      Orders Placed This Encounter    amLODIPine (NORVASC) 10 mg tablet       Follow-up and Dispositions    Return in about 6 months (around 7/26/2023). Kandi Taylor MD  1/26/2023  Please note that this dictation was completed with [employer], the computer voice recognition software. Quite often unanticipated grammatical, syntax, homophones, and other interpretive errors are inadvertently transcribed by the computer software. Please disregard these errors. Please excuse any errors that have escaped final proofreading. Thank you.

## 2023-05-18 RX ORDER — ALENDRONATE SODIUM 70 MG/1
TABLET ORAL
Qty: 12 TABLET | Refills: 3 | Status: SHIPPED | OUTPATIENT
Start: 2023-05-18

## 2023-06-02 RX ORDER — CLONIDINE HYDROCHLORIDE 0.3 MG/1
0.3 TABLET ORAL 2 TIMES DAILY
Qty: 180 TABLET | Refills: 0 | Status: SHIPPED | OUTPATIENT
Start: 2023-06-02

## 2023-06-02 NOTE — TELEPHONE ENCOUNTER
Last appointment: 9/26/22  Next appointment: Narcisa Du to follow-up 3/26/23  Previous refill encounter(s): 4/22/22 #189 with 3 refills    Requested Prescriptions     Pending Prescriptions Disp Refills    cloNIDine (CATAPRES) 0.3 MG tablet 180 tablet 0     Sig: Take 1 tablet by mouth 2 times daily Patient needs an appointment for further refills         For Pharmacy Admin Tracking Only    Program: Medication Refill  CPA in place:    Recommendation Provided To:    Intervention Detail: New Rx: 1, reason: Patient Preferencentervention Accepted By:   Abhishek Buckley Closed?:    Time Spent (min): 5

## 2023-07-07 RX ORDER — OMEPRAZOLE 40 MG/1
CAPSULE, DELAYED RELEASE ORAL
Qty: 90 CAPSULE | Refills: 0 | Status: SHIPPED | OUTPATIENT
Start: 2023-07-07

## 2023-07-07 NOTE — TELEPHONE ENCOUNTER
Last appointment: 09/26/2022 MD Neida Fox   Next appointment: Nothing scheduled   Previous refill encounter(s):   04/22/2022 Prilosec #90 with 12 refills.      For Pharmacy Admin Tracking Only    Program: Medication Refill  Intervention Detail: New Rx: 1, reason: Patient Preference  Time Spent (min): 5      Requested Prescriptions     Pending Prescriptions Disp Refills    omeprazole (PRILOSEC) 40 MG delayed release capsule 90 capsule 0     Sig: TAKE ONE CAPSULE BY MOUTH DAILY as needed  FOR INDIGESTION

## 2023-07-27 ENCOUNTER — OFFICE VISIT (OUTPATIENT)
Age: 88
End: 2023-07-27
Payer: MEDICARE

## 2023-07-27 VITALS
HEART RATE: 76 BPM | DIASTOLIC BLOOD PRESSURE: 66 MMHG | OXYGEN SATURATION: 98 % | WEIGHT: 95.6 LBS | BODY MASS INDEX: 18.77 KG/M2 | SYSTOLIC BLOOD PRESSURE: 122 MMHG | HEIGHT: 60 IN

## 2023-07-27 DIAGNOSIS — E78.00 HYPERCHOLESTEREMIA: Primary | ICD-10-CM

## 2023-07-27 DIAGNOSIS — R60.9 DEPENDENT EDEMA: ICD-10-CM

## 2023-07-27 DIAGNOSIS — I10 ESSENTIAL HYPERTENSION: ICD-10-CM

## 2023-07-27 PROCEDURE — 99213 OFFICE O/P EST LOW 20 MIN: CPT | Performed by: SPECIALIST

## 2023-07-27 PROCEDURE — 1123F ACP DISCUSS/DSCN MKR DOCD: CPT | Performed by: SPECIALIST

## 2023-07-27 ASSESSMENT — PATIENT HEALTH QUESTIONNAIRE - PHQ9
SUM OF ALL RESPONSES TO PHQ9 QUESTIONS 1 & 2: 0
SUM OF ALL RESPONSES TO PHQ QUESTIONS 1-9: 0
2. FEELING DOWN, DEPRESSED OR HOPELESS: 0
1. LITTLE INTEREST OR PLEASURE IN DOING THINGS: 0

## 2023-08-09 RX ORDER — LEVOTHYROXINE SODIUM 0.03 MG/1
TABLET ORAL
Qty: 90 TABLET | Refills: 3 | Status: SHIPPED | OUTPATIENT
Start: 2023-08-09

## 2023-08-09 NOTE — TELEPHONE ENCOUNTER
Last appointment: 9/6/22  Next appointment: 9/6/23  Previous refill encounter(s): 10/28/22 #90 with 3 refills    Requested Prescriptions     Pending Prescriptions Disp Refills    levothyroxine (SYNTHROID) 25 MCG tablet 90 tablet 3     Sig: TAKE 1 TABLET BY MOUTH EVERY DAY FOR THYROID         For Pharmacy Admin Tracking Only    Program: Medication Refill  CPA in place:    Recommendation Provided To:    Intervention Detail: New Rx: 1, reason: Patient Preference  Intervention Accepted By:   León Cortes?:    Time Spent (min): 5

## 2023-08-09 NOTE — TELEPHONE ENCOUNTER
Pt is out of medication     She needs a med refill  Levothyroxine    [facility] on [address]     Best number to reach her is [phone]

## 2023-09-06 ENCOUNTER — OFFICE VISIT (OUTPATIENT)
Age: 88
End: 2023-09-06
Payer: MEDICARE

## 2023-09-06 VITALS
RESPIRATION RATE: 16 BRPM | BODY MASS INDEX: 18.5 KG/M2 | OXYGEN SATURATION: 95 % | HEART RATE: 73 BPM | DIASTOLIC BLOOD PRESSURE: 61 MMHG | WEIGHT: 98 LBS | TEMPERATURE: 97.8 F | HEIGHT: 61 IN | SYSTOLIC BLOOD PRESSURE: 140 MMHG

## 2023-09-06 DIAGNOSIS — M79.89 CALF SWELLING: ICD-10-CM

## 2023-09-06 DIAGNOSIS — I10 ESSENTIAL (PRIMARY) HYPERTENSION: ICD-10-CM

## 2023-09-06 DIAGNOSIS — Z00.00 ENCOUNTER FOR MEDICARE ANNUAL WELLNESS EXAM: Primary | ICD-10-CM

## 2023-09-06 DIAGNOSIS — E03.9 ACQUIRED HYPOTHYROIDISM: ICD-10-CM

## 2023-09-06 PROCEDURE — 99213 OFFICE O/P EST LOW 20 MIN: CPT | Performed by: FAMILY MEDICINE

## 2023-09-06 SDOH — ECONOMIC STABILITY: INCOME INSECURITY: HOW HARD IS IT FOR YOU TO PAY FOR THE VERY BASICS LIKE FOOD, HOUSING, MEDICAL CARE, AND HEATING?: SOMEWHAT HARD

## 2023-09-06 SDOH — ECONOMIC STABILITY: FOOD INSECURITY: WITHIN THE PAST 12 MONTHS, THE FOOD YOU BOUGHT JUST DIDN'T LAST AND YOU DIDN'T HAVE MONEY TO GET MORE.: NEVER TRUE

## 2023-09-06 SDOH — ECONOMIC STABILITY: HOUSING INSECURITY
IN THE LAST 12 MONTHS, WAS THERE A TIME WHEN YOU DID NOT HAVE A STEADY PLACE TO SLEEP OR SLEPT IN A SHELTER (INCLUDING NOW)?: NO

## 2023-09-06 SDOH — ECONOMIC STABILITY: FOOD INSECURITY: WITHIN THE PAST 12 MONTHS, YOU WORRIED THAT YOUR FOOD WOULD RUN OUT BEFORE YOU GOT MONEY TO BUY MORE.: NEVER TRUE

## 2023-09-06 ASSESSMENT — PATIENT HEALTH QUESTIONNAIRE - PHQ9
SUM OF ALL RESPONSES TO PHQ QUESTIONS 1-9: 0
2. FEELING DOWN, DEPRESSED OR HOPELESS: 0
1. LITTLE INTEREST OR PLEASURE IN DOING THINGS: 0
SUM OF ALL RESPONSES TO PHQ QUESTIONS 1-9: 0
SUM OF ALL RESPONSES TO PHQ9 QUESTIONS 1 & 2: 0

## 2023-09-06 ASSESSMENT — LIFESTYLE VARIABLES
HOW OFTEN DO YOU HAVE A DRINK CONTAINING ALCOHOL: NEVER
HOW MANY STANDARD DRINKS CONTAINING ALCOHOL DO YOU HAVE ON A TYPICAL DAY: PATIENT DOES NOT DRINK

## 2023-09-06 NOTE — PROGRESS NOTES
Chief Complaint   Patient presents with    Medicare AWV         1. \"Have you been to the ER, urgent care clinic since your last visit? Hospitalized since your last visit? \" no    2. \"Have you seen or consulted any other health care providers outside of the [address] [place] [address] since your last visit? \" no     3. For patients aged 43-73: Has the patient had a colonoscopy / FIT/ Cologuard? N/A      If the patient is female:    4. For patients aged 43-66: Has the patient had a mammogram within the past 2 years? N/A      5. For patients aged 21-65: Has the patient had a pap smear?  N/A      Health Maintenance Due   Topic Date Due    Shingles vaccine (1 of 2) 01/05/2016    COVID-19 Vaccine (5 - Booster for [employer] series) 09/22/2022    Annual Wellness Visit (AWV)  04/23/2023    Flu vaccine (1) 08/01/2023    Lipids  09/26/2023

## 2023-09-06 NOTE — PROGRESS NOTES
Saroj Sanford (:  1935) is a 81 y.o. female,Established patient, here for evaluation of the following chief complaint(s):  Medicare AWV         ASSESSMENT/PLAN:  1. Encounter for Medicare annual wellness exam  2. Calf swelling  -     Vascular duplex lower extremity venous left; Future  3. Acquired hypothyroidism  -     T4, Free; Future  -     TSH; Future  4. Essential (primary) hypertension  -     Comprehensive Metabolic Panel; Future  -     CBC with Auto Differential; Future      Return in about 6 months (around 3/6/2024). Subjective   SUBJECTIVE/OBJECTIVE:Needs medicare wellness exam.  HPIsees Dr. Chen Giron (cardiology), Dr. Carina Mercedes (podiatry), Dr. Zachariah Costa (ophthalmology). Needs medicare wellness exam. UTD on immunizations. Would want sister Theresa Fan to be her mPOA if she became incapacitated. Also [facility] daughter Eleni Fermin. Has been having swelling in L calf for 2 weeks. Also needs hypertension, thyroid and cholesterol checked. No complaints of chest pain, shortness of breath, TIAs, claudication or edema. Review of Systems   HENT:  Negative for hearing loss. Neurological:         Uses a walker. No falls independent in adls. Needs help with shower. Memory fair. Psychiatric/Behavioral:          Not depressed. No alcohol. Objective   Physical Exam  Cardiovascular:      Rate and Rhythm: Normal rate and regular rhythm. Heart sounds: Normal heart sounds. No murmur heard. Pulmonary:      Effort: Pulmonary effort is normal.      Breath sounds: Normal breath sounds. Abdominal:      General: Abdomen is flat. Bowel sounds are normal.      Palpations: Abdomen is soft. Musculoskeletal:      Right lower leg: No edema. Left lower leg: No edema. Comments: L calf- 32 cm in diameter. R calf 28.5 cm in diameter              An electronic signature was used to authenticate this note.     --João Maria MD

## 2023-09-07 LAB
ALBUMIN SERPL-MCNC: 3.8 G/DL (ref 3.5–5)
ALBUMIN/GLOB SERPL: 0.9 (ref 1.1–2.2)
ALP SERPL-CCNC: 92 U/L (ref 45–117)
ALT SERPL-CCNC: 32 U/L (ref 12–78)
ANION GAP SERPL CALC-SCNC: 7 MMOL/L (ref 5–15)
AST SERPL-CCNC: 23 U/L (ref 15–37)
BASOPHILS # BLD: 0 K/UL (ref 0–0.1)
BASOPHILS NFR BLD: 0 % (ref 0–1)
BILIRUB SERPL-MCNC: 0.2 MG/DL (ref 0.2–1)
BUN SERPL-MCNC: 34 MG/DL (ref 6–20)
BUN/CREAT SERPL: 29 (ref 12–20)
CALCIUM SERPL-MCNC: 9.3 MG/DL (ref 8.5–10.1)
CHLORIDE SERPL-SCNC: 104 MMOL/L (ref 97–108)
CO2 SERPL-SCNC: 30 MMOL/L (ref 21–32)
CREAT SERPL-MCNC: 1.19 MG/DL (ref 0.55–1.02)
DIFFERENTIAL METHOD BLD: NORMAL
EOSINOPHIL # BLD: 0.1 K/UL (ref 0–0.4)
EOSINOPHIL NFR BLD: 2 % (ref 0–7)
ERYTHROCYTE [DISTWIDTH] IN BLOOD BY AUTOMATED COUNT: 14.4 % (ref 11.5–14.5)
GLOBULIN SER CALC-MCNC: 4.4 G/DL (ref 2–4)
GLUCOSE SERPL-MCNC: 115 MG/DL (ref 65–100)
HCT VFR BLD AUTO: 39.2 % (ref 35–47)
HGB BLD-MCNC: 11.8 G/DL (ref 11.5–16)
IMM GRANULOCYTES # BLD AUTO: 0 K/UL (ref 0–0.04)
IMM GRANULOCYTES NFR BLD AUTO: 0 % (ref 0–0.5)
LYMPHOCYTES # BLD: 2.4 K/UL (ref 0.8–3.5)
LYMPHOCYTES NFR BLD: 33 % (ref 12–49)
MCH RBC QN AUTO: 26.8 PG (ref 26–34)
MCHC RBC AUTO-ENTMCNC: 30.1 G/DL (ref 30–36.5)
MCV RBC AUTO: 89.1 FL (ref 80–99)
MONOCYTES # BLD: 0.5 K/UL (ref 0–1)
MONOCYTES NFR BLD: 7 % (ref 5–13)
NEUTS SEG # BLD: 4.3 K/UL (ref 1.8–8)
NEUTS SEG NFR BLD: 58 % (ref 32–75)
NRBC # BLD: 0 K/UL (ref 0–0.01)
NRBC BLD-RTO: 0 PER 100 WBC
PLATELET # BLD AUTO: 206 K/UL (ref 150–400)
PMV BLD AUTO: 11.4 FL (ref 8.9–12.9)
POTASSIUM SERPL-SCNC: 4 MMOL/L (ref 3.5–5.1)
PROT SERPL-MCNC: 8.2 G/DL (ref 6.4–8.2)
RBC # BLD AUTO: 4.4 M/UL (ref 3.8–5.2)
SODIUM SERPL-SCNC: 141 MMOL/L (ref 136–145)
T4 FREE SERPL-MCNC: 0.9 NG/DL (ref 0.8–1.5)
TSH SERPL DL<=0.05 MIU/L-ACNC: 1 UIU/ML (ref 0.36–3.74)
WBC # BLD AUTO: 7.4 K/UL (ref 3.6–11)

## 2023-09-13 ENCOUNTER — TELEPHONE (OUTPATIENT)
Age: 88
End: 2023-09-13

## 2023-09-13 RX ORDER — CLONIDINE HYDROCHLORIDE 0.3 MG/1
0.3 TABLET ORAL 2 TIMES DAILY
Qty: 180 TABLET | Refills: 3 | Status: SHIPPED | OUTPATIENT
Start: 2023-09-13

## 2023-09-13 RX ORDER — AMLODIPINE BESYLATE 10 MG/1
10 TABLET ORAL DAILY
Qty: 90 TABLET | Refills: 1 | Status: SHIPPED | OUTPATIENT
Start: 2023-09-13

## 2023-09-13 NOTE — TELEPHONE ENCOUNTER
Refill for Amlodipine  10 mg 1 tablet by mouth daily sent to [facility] per pharmacy request by fax.     Per Verbal Order by MD/NP     Future Appointments   Date Time Provider [address] [place] [address]   9/14/2023 10:00 AM [facility] VASCULAR ROOM 1 [address] [place] [address]   1/30/2024  9:20 AM Sharon Lal MD [facility] BS AMB   3/6/2024  9:40 AM Sebastian Gar MD [facility] BS AMB

## 2023-09-13 NOTE — TELEPHONE ENCOUNTER
Last appointment: 9/6/23  Next appointment: 3/6/24  Previous refill encounter(s): 6/2/23 #180    Requested Prescriptions     Pending Prescriptions Disp Refills    cloNIDine (CATAPRES) 0.3 MG tablet 180 tablet 3     Sig: Take 1 tablet by mouth 2 times daily         For Pharmacy Admin Tracking Only    Program: Medication Refill  CPA in place:    Recommendation Provided To:    Intervention Detail: New Rx: 1, reason: Patient Preference  Intervention Accepted By:   Keli Damon Closed?:    Time Spent (min): 5

## 2023-09-14 ENCOUNTER — HOSPITAL ENCOUNTER (OUTPATIENT)
Facility: HOSPITAL | Age: 88
Discharge: HOME | End: 2023-09-16
Attending: FAMILY MEDICINE
Payer: MEDICARE

## 2023-09-14 DIAGNOSIS — M79.89 CALF SWELLING: ICD-10-CM

## 2023-09-14 PROCEDURE — 93971 EXTREMITY STUDY: CPT

## 2023-09-19 ENCOUNTER — TELEPHONE (OUTPATIENT)
Age: 88
End: 2023-09-19

## 2023-10-06 RX ORDER — OMEPRAZOLE 40 MG/1
40 CAPSULE, DELAYED RELEASE ORAL DAILY PRN
Qty: 90 CAPSULE | Refills: 3 | Status: SHIPPED | OUTPATIENT
Start: 2023-10-06

## 2023-10-06 NOTE — TELEPHONE ENCOUNTER
Last appointment: 9/6/23  Next appointment: 3/6/24  Previous refill encounter(s): 7/7/23 #90    Requested Prescriptions     Pending Prescriptions Disp Refills    omeprazole (PRILOSEC) 40 MG delayed release capsule 90 capsule 3     Sig: Take 1 capsule by mouth daily as needed (indigestion)         For Pharmacy Admin Tracking Only    Program: Medication Refill  CPA in place:    Recommendation Provided To:    Intervention Detail: New Rx: 1, reason: Patient Preference  Intervention Accepted By:   Bonifacio Weaver Closed?:    Time Spent (min): 5
Yes

## 2024-01-03 RX ORDER — ATORVASTATIN CALCIUM 20 MG/1
TABLET, FILM COATED ORAL
Qty: 90 TABLET | Refills: 1 | Status: SHIPPED | OUTPATIENT
Start: 2024-01-03

## 2024-01-30 ENCOUNTER — OFFICE VISIT (OUTPATIENT)
Age: 89
End: 2024-01-30
Payer: MEDICARE

## 2024-01-30 VITALS
OXYGEN SATURATION: 93 % | DIASTOLIC BLOOD PRESSURE: 62 MMHG | SYSTOLIC BLOOD PRESSURE: 108 MMHG | HEIGHT: 61 IN | RESPIRATION RATE: 20 BRPM | HEART RATE: 64 BPM | WEIGHT: 93.4 LBS | BODY MASS INDEX: 17.64 KG/M2

## 2024-01-30 DIAGNOSIS — I10 ESSENTIAL HYPERTENSION: Primary | ICD-10-CM

## 2024-01-30 DIAGNOSIS — R60.9 DEPENDENT EDEMA: ICD-10-CM

## 2024-01-30 DIAGNOSIS — R00.2 PALPITATIONS: ICD-10-CM

## 2024-01-30 DIAGNOSIS — E78.00 HYPERCHOLESTEREMIA: ICD-10-CM

## 2024-01-30 PROCEDURE — 99214 OFFICE O/P EST MOD 30 MIN: CPT | Performed by: SPECIALIST

## 2024-01-30 PROCEDURE — 1123F ACP DISCUSS/DSCN MKR DOCD: CPT | Performed by: SPECIALIST

## 2024-01-30 ASSESSMENT — PATIENT HEALTH QUESTIONNAIRE - PHQ9
SUM OF ALL RESPONSES TO PHQ QUESTIONS 1-9: 0
SUM OF ALL RESPONSES TO PHQ QUESTIONS 1-9: 0
SUM OF ALL RESPONSES TO PHQ9 QUESTIONS 1 & 2: 0
SUM OF ALL RESPONSES TO PHQ QUESTIONS 1-9: 0
1. LITTLE INTEREST OR PLEASURE IN DOING THINGS: 0
2. FEELING DOWN, DEPRESSED OR HOPELESS: 0

## 2024-01-30 NOTE — PROGRESS NOTES
HISTORY OF PRESENT ILLNESS  Saroj Sanford is a 88 y.o. female     SUMMARY:   Patient Active Problem List   Diagnosis    Gait instability    Vertigo    Osteopenia    Hypercholesteremia    Essential hypertension    Pulmonary embolus (HCC)    SBO (small bowel obstruction) (HCC)    Esophageal mass    Palpitations    GERD (gastroesophageal reflux disease)    Respiratory failure (HCC)    Bradycardia    Chronic renal disease, stage III (HCC)            CARDIOLOGY STUDIES TO DATE:  8/16 echo normal lvef, lae, mild tr without pul htn     12/21 holter, occ pac, pvc. Slowest hr 41 bpm, no pauses    Chief Complaint   Patient presents with    Hypertension    Follow-up     6 month follow up       HPI :  She is doing well with her only issue being dependent lower extremity edema.  She is very and active gets around with a rolling walker and has not had any falls.  Blood pressure is well-controlled and most recent lipid profile looked excellent.  No recent palpitations.    CARDIAC ROS:   negative for chest pain, claudication, dyspnea, orthopnea, paroxysmal nocturnal dyspnea, and syncope    Family History   Problem Relation Age of Onset    Heart Attack Neg Hx        Past Medical History:   Diagnosis Date    GERD (gastroesophageal reflux disease) 2007    EGD- reflux esophagitis    Hypercholesterolemia     Hypertension     Osteopenia 11/15/2011    Pulmonary embolus (HCC)     hx. of PE after surgery    Thyroid disease        Specialty Problems          Cardiology Problems    Essential hypertension        Hypercholesteremia        Pulmonary embolus (HCC)        Bradycardia            GENERAL ROS:  A comprehensive review of systems was negative except for what was noted in the HPI.  /62 (Site: Right Upper Arm, Position: Sitting, Cuff Size: Medium Adult)   Pulse 64   Resp 20   Ht 1.549 m (5' 1\")   Wt 42.4 kg (93 lb 6.4 oz)   SpO2 93%   BMI 17.65 kg/m²     Wt Readings from Last 3 Encounters:   01/30/24 42.4 kg (93

## 2024-02-19 RX ORDER — FUROSEMIDE 20 MG/1
TABLET ORAL
Qty: 30 TABLET | Refills: 2 | Status: SHIPPED | OUTPATIENT
Start: 2024-02-19

## 2024-03-06 ENCOUNTER — OFFICE VISIT (OUTPATIENT)
Age: 89
End: 2024-03-06
Payer: MEDICARE

## 2024-03-06 VITALS
BODY MASS INDEX: 19.3 KG/M2 | RESPIRATION RATE: 16 BRPM | SYSTOLIC BLOOD PRESSURE: 121 MMHG | TEMPERATURE: 98.2 F | OXYGEN SATURATION: 99 % | DIASTOLIC BLOOD PRESSURE: 52 MMHG | HEIGHT: 61 IN | HEART RATE: 76 BPM | WEIGHT: 102.2 LBS

## 2024-03-06 DIAGNOSIS — E78.00 PURE HYPERCHOLESTEROLEMIA, UNSPECIFIED: ICD-10-CM

## 2024-03-06 DIAGNOSIS — I10 ESSENTIAL (PRIMARY) HYPERTENSION: ICD-10-CM

## 2024-03-06 DIAGNOSIS — Z00.00 ENCOUNTER FOR MEDICARE ANNUAL WELLNESS EXAM: Primary | ICD-10-CM

## 2024-03-06 PROCEDURE — 99213 OFFICE O/P EST LOW 20 MIN: CPT | Performed by: FAMILY MEDICINE

## 2024-03-06 PROCEDURE — G0439 PPPS, SUBSEQ VISIT: HCPCS | Performed by: FAMILY MEDICINE

## 2024-03-06 PROCEDURE — 1123F ACP DISCUSS/DSCN MKR DOCD: CPT | Performed by: FAMILY MEDICINE

## 2024-03-06 RX ORDER — INDAPAMIDE 1.25 MG/1
1.25 TABLET ORAL DAILY
Qty: 90 TABLET | Refills: 3 | Status: SHIPPED | OUTPATIENT
Start: 2024-03-06

## 2024-03-06 RX ORDER — AMLODIPINE BESYLATE 10 MG/1
10 TABLET ORAL DAILY
Qty: 90 TABLET | Refills: 3 | Status: SHIPPED | OUTPATIENT
Start: 2024-03-06

## 2024-03-06 RX ORDER — ATORVASTATIN CALCIUM 20 MG/1
20 TABLET, FILM COATED ORAL DAILY
Qty: 90 TABLET | Refills: 3 | Status: SHIPPED | OUTPATIENT
Start: 2024-03-06

## 2024-03-06 RX ORDER — FUROSEMIDE 20 MG/1
TABLET ORAL
Qty: 30 TABLET | Refills: 2 | Status: CANCELLED | OUTPATIENT
Start: 2024-03-06

## 2024-03-06 NOTE — PROGRESS NOTES
Chief Complaint   Patient presents with    Hypertension    Cholesterol Problem    Follow-up         1. \"Have you been to the ER, urgent care clinic since your last visit?  Hospitalized since your last visit?\" no    2. \"Have you seen or consulted any other health care providers outside of the [facility] since your last visit?\" CARDIOLOGY - DR. PAM REGALADO [facility] - 2 WEEKS AGO     3. For patients aged 45-75: Has the patient had a colonoscopy / FIT/ Cologuard? N/A      If the patient is female:    4. For patients aged 40-74: Has the patient had a mammogram within the past 2 years? N/A      5. For patients aged 21-65: Has the patient had a pap smear? N/A      Health Maintenance Due   Topic Date Due    Shingles vaccine (1 of 2) 01/05/2016    Lipids  09/26/2023    Annual Wellness Visit (Medicare Advantage)  01/01/2024      
Panel     CBC with Auto Differential      3. Pure hypercholesterolemia, unspecified  E78.00 Lipid Panel     Lipid Panel         Orders Placed This Encounter    CBC with Auto Differential     Standing Status:   Future     Number of Occurrences:   1     Standing Expiration Date:   3/6/2025    Comprehensive Metabolic Panel     Standing Status:   Future     Number of Occurrences:   1     Standing Expiration Date:   3/6/2025    Lipid Panel     Standing Status:   Future     Number of Occurrences:   1     Standing Expiration Date:   3/6/2025    amLODIPine (NORVASC) 10 MG tablet     Sig: Take 1 tablet by mouth daily     Dispense:  90 tablet     Refill:  3    indapamide (LOZOL) 1.25 MG tablet     Sig: Take 1 tablet by mouth daily for blood pressure     Dispense:  90 tablet     Refill:  3    atorvastatin (LIPITOR) 20 MG tablet     Sig: Take 1 tablet by mouth daily for cholesterol     Dispense:  90 tablet     Refill:  3                     Mario Carroll MD

## 2024-03-07 LAB
ALBUMIN SERPL-MCNC: 3.7 G/DL (ref 3.5–5)
ALBUMIN/GLOB SERPL: 0.9 (ref 1.1–2.2)
ALP SERPL-CCNC: 86 U/L (ref 45–117)
ALT SERPL-CCNC: 36 U/L (ref 12–78)
ANION GAP SERPL CALC-SCNC: 7 MMOL/L (ref 5–15)
AST SERPL-CCNC: 28 U/L (ref 15–37)
BASOPHILS # BLD: 0 K/UL (ref 0–0.1)
BASOPHILS NFR BLD: 1 % (ref 0–1)
BILIRUB SERPL-MCNC: 0.3 MG/DL (ref 0.2–1)
BUN SERPL-MCNC: 35 MG/DL (ref 6–20)
BUN/CREAT SERPL: 33 (ref 12–20)
CALCIUM SERPL-MCNC: 9.5 MG/DL (ref 8.5–10.1)
CHLORIDE SERPL-SCNC: 107 MMOL/L (ref 97–108)
CHOLEST SERPL-MCNC: 174 MG/DL
CO2 SERPL-SCNC: 27 MMOL/L (ref 21–32)
CREAT SERPL-MCNC: 1.06 MG/DL (ref 0.55–1.02)
DIFFERENTIAL METHOD BLD: NORMAL
EOSINOPHIL # BLD: 0.1 K/UL (ref 0–0.4)
EOSINOPHIL NFR BLD: 1 % (ref 0–7)
ERYTHROCYTE [DISTWIDTH] IN BLOOD BY AUTOMATED COUNT: 13.5 % (ref 11.5–14.5)
GLOBULIN SER CALC-MCNC: 3.9 G/DL (ref 2–4)
GLUCOSE SERPL-MCNC: 83 MG/DL (ref 65–100)
HCT VFR BLD AUTO: 37.4 % (ref 35–47)
HDLC SERPL-MCNC: 112 MG/DL
HDLC SERPL: 1.6 (ref 0–5)
HGB BLD-MCNC: 11.5 G/DL (ref 11.5–16)
IMM GRANULOCYTES # BLD AUTO: 0 K/UL (ref 0–0.04)
IMM GRANULOCYTES NFR BLD AUTO: 0 % (ref 0–0.5)
LDLC SERPL CALC-MCNC: 53.2 MG/DL (ref 0–100)
LYMPHOCYTES # BLD: 2.7 K/UL (ref 0.8–3.5)
LYMPHOCYTES NFR BLD: 35 % (ref 12–49)
MCH RBC QN AUTO: 26.9 PG (ref 26–34)
MCHC RBC AUTO-ENTMCNC: 30.7 G/DL (ref 30–36.5)
MCV RBC AUTO: 87.6 FL (ref 80–99)
MONOCYTES # BLD: 0.6 K/UL (ref 0–1)
MONOCYTES NFR BLD: 8 % (ref 5–13)
NEUTS SEG # BLD: 4.3 K/UL (ref 1.8–8)
NEUTS SEG NFR BLD: 55 % (ref 32–75)
NRBC # BLD: 0 K/UL (ref 0–0.01)
NRBC BLD-RTO: 0 PER 100 WBC
PLATELET # BLD AUTO: 205 K/UL (ref 150–400)
PMV BLD AUTO: 11.5 FL (ref 8.9–12.9)
POTASSIUM SERPL-SCNC: 4.3 MMOL/L (ref 3.5–5.1)
PROT SERPL-MCNC: 7.6 G/DL (ref 6.4–8.2)
RBC # BLD AUTO: 4.27 M/UL (ref 3.8–5.2)
SODIUM SERPL-SCNC: 141 MMOL/L (ref 136–145)
TRIGL SERPL-MCNC: 44 MG/DL
VLDLC SERPL CALC-MCNC: 8.8 MG/DL
WBC # BLD AUTO: 7.9 K/UL (ref 3.6–11)

## 2024-04-10 RX ORDER — ALENDRONATE SODIUM 70 MG/1
TABLET ORAL
Qty: 12 TABLET | Refills: 3 | Status: SHIPPED | OUTPATIENT
Start: 2024-04-10

## 2024-04-10 NOTE — TELEPHONE ENCOUNTER
Last appointment: 3/6/24  Next appointment: 9/10/24  Previous refill encounter(s): 5/18/23 #12 with 3 refills    Requested Prescriptions     Pending Prescriptions Disp Refills    alendronate (FOSAMAX) 70 MG tablet [Pharmacy Med Name: ALENDRONATE 70MG TABLETS] 12 tablet 3     Sig: TAKE 1 TABLET BY MOUTH ONCE A WEEK ON AN EMPTY STOMACH WITH 8 OZ OF WATER FOR BONE HEALTH         For Pharmacy Admin Tracking Only    Program: Medication Refill  CPA in place:    Recommendation Provided To:   Intervention Detail: New Rx: 1, reason: Patient Preference  Intervention Accepted By:   Gap Closed?:    Time Spent (min): 5

## 2024-07-01 RX ORDER — LEVOTHYROXINE SODIUM 0.03 MG/1
TABLET ORAL
Qty: 90 TABLET | Refills: 3 | Status: SHIPPED | OUTPATIENT
Start: 2024-07-01

## 2024-08-13 ENCOUNTER — OFFICE VISIT (OUTPATIENT)
Age: 89
End: 2024-08-13
Payer: MEDICARE

## 2024-08-13 VITALS
HEIGHT: 61 IN | SYSTOLIC BLOOD PRESSURE: 122 MMHG | OXYGEN SATURATION: 95 % | HEART RATE: 62 BPM | DIASTOLIC BLOOD PRESSURE: 62 MMHG | BODY MASS INDEX: 18.88 KG/M2 | WEIGHT: 100 LBS

## 2024-08-13 DIAGNOSIS — E78.00 HYPERCHOLESTEREMIA: ICD-10-CM

## 2024-08-13 DIAGNOSIS — R00.2 PALPITATIONS: ICD-10-CM

## 2024-08-13 DIAGNOSIS — R60.9 DEPENDENT EDEMA: ICD-10-CM

## 2024-08-13 DIAGNOSIS — I10 ESSENTIAL HYPERTENSION: Primary | ICD-10-CM

## 2024-08-13 PROCEDURE — 1123F ACP DISCUSS/DSCN MKR DOCD: CPT | Performed by: SPECIALIST

## 2024-08-13 PROCEDURE — 99214 OFFICE O/P EST MOD 30 MIN: CPT | Performed by: SPECIALIST

## 2024-08-13 RX ORDER — FUROSEMIDE 20 MG/1
20 TABLET ORAL DAILY
Qty: 30 TABLET | Refills: 5 | Status: SHIPPED | OUTPATIENT
Start: 2024-08-13

## 2024-08-13 ASSESSMENT — PATIENT HEALTH QUESTIONNAIRE - PHQ9
SUM OF ALL RESPONSES TO PHQ QUESTIONS 1-9: 0
SUM OF ALL RESPONSES TO PHQ9 QUESTIONS 1 & 2: 0
SUM OF ALL RESPONSES TO PHQ QUESTIONS 1-9: 0
2. FEELING DOWN, DEPRESSED OR HOPELESS: NOT AT ALL
1. LITTLE INTEREST OR PLEASURE IN DOING THINGS: NOT AT ALL

## 2024-08-13 NOTE — PROGRESS NOTES
HISTORY OF PRESENT ILLNESS  Saroj Sanford is a 89 y.o. female     SUMMARY:   Patient Active Problem List   Diagnosis    Gait instability    Vertigo    Osteopenia    Hypercholesteremia    Essential hypertension    Pulmonary embolus (HCC)    SBO (small bowel obstruction) (HCC)    Esophageal mass    Palpitations    GERD (gastroesophageal reflux disease)    Respiratory failure (HCC)    Bradycardia    Chronic renal disease, stage III (HCC)            CARDIOLOGY STUDIES TO DATE:  8/16 echo normal lvef, lae, mild tr without pul htn     12/21 holter, occ pac, pvc. Slowest hr 41 bpm, no pauses    Chief Complaint   Patient presents with    Hypertension       HPI :  She continues to do remarkably well.  Blood pressure is well-controlled recent lipid profile looked excellent and other than a little bit of dependent edema no complaints.  She uses a little leg exerciser twice a day which makes her legs feel even better and she walks around the house with her walker at least twice a day without any difficulty.  She has not had any falls.    CARDIAC ROS:   negative for chest pain, claudication, dyspnea, irregular heart beat, orthopnea, paroxysmal nocturnal dyspnea, and syncope    Family History   Problem Relation Age of Onset    Heart Attack Neg Hx        Past Medical History:   Diagnosis Date    GERD (gastroesophageal reflux disease) 2007    EGD- reflux esophagitis    Hypercholesterolemia     Hypertension     Osteopenia 11/15/2011    Pulmonary embolus (HCC)     hx. of PE after surgery    Thyroid disease        Specialty Problems          Cardiology Problems    Essential hypertension        Hypercholesteremia        Pulmonary embolus (HCC)        Bradycardia            GENERAL ROS:  A comprehensive review of systems was negative except for what was noted in the HPI.  /62 (Site: Left Upper Arm, Position: Sitting, Cuff Size: Small Adult)   Pulse 62   Ht 1.549 m (5' 1\")   Wt 45.4 kg (100 lb)   SpO2 95%   BMI

## 2024-09-10 ENCOUNTER — OFFICE VISIT (OUTPATIENT)
Age: 89
End: 2024-09-10
Payer: MEDICARE

## 2024-09-10 VITALS
TEMPERATURE: 98.1 F | OXYGEN SATURATION: 97 % | DIASTOLIC BLOOD PRESSURE: 80 MMHG | HEART RATE: 58 BPM | BODY MASS INDEX: 20.54 KG/M2 | HEIGHT: 61 IN | SYSTOLIC BLOOD PRESSURE: 145 MMHG | WEIGHT: 108.8 LBS | RESPIRATION RATE: 16 BRPM

## 2024-09-10 DIAGNOSIS — E78.00 HYPERCHOLESTEROLEMIA: ICD-10-CM

## 2024-09-10 DIAGNOSIS — I10 ESSENTIAL HYPERTENSION: Primary | ICD-10-CM

## 2024-09-10 DIAGNOSIS — R53.1 WEAKNESS: ICD-10-CM

## 2024-09-10 LAB
ALBUMIN SERPL-MCNC: 4.1 G/DL (ref 3.5–5)
ALBUMIN/GLOB SERPL: 1.1 (ref 1.1–2.2)
ALP SERPL-CCNC: 95 U/L (ref 45–117)
ALT SERPL-CCNC: 32 U/L (ref 12–78)
ANION GAP SERPL CALC-SCNC: 4 MMOL/L (ref 2–12)
AST SERPL-CCNC: 31 U/L (ref 15–37)
BASOPHILS # BLD: 0 K/UL (ref 0–0.1)
BASOPHILS NFR BLD: 1 % (ref 0–1)
BILIRUB SERPL-MCNC: 0.4 MG/DL (ref 0.2–1)
BUN SERPL-MCNC: 30 MG/DL (ref 6–20)
BUN/CREAT SERPL: 27 (ref 12–20)
CALCIUM SERPL-MCNC: 10 MG/DL (ref 8.5–10.1)
CHLORIDE SERPL-SCNC: 103 MMOL/L (ref 97–108)
CHOLEST SERPL-MCNC: 170 MG/DL
CO2 SERPL-SCNC: 34 MMOL/L (ref 21–32)
CREAT SERPL-MCNC: 1.13 MG/DL (ref 0.55–1.02)
DIFFERENTIAL METHOD BLD: ABNORMAL
EOSINOPHIL # BLD: 0.2 K/UL (ref 0–0.4)
EOSINOPHIL NFR BLD: 3 % (ref 0–7)
ERYTHROCYTE [DISTWIDTH] IN BLOOD BY AUTOMATED COUNT: 14 % (ref 11.5–14.5)
GLOBULIN SER CALC-MCNC: 3.9 G/DL (ref 2–4)
GLUCOSE SERPL-MCNC: 108 MG/DL (ref 65–100)
HCT VFR BLD AUTO: 36.8 % (ref 35–47)
HDLC SERPL-MCNC: 118 MG/DL
HDLC SERPL: 1.4 (ref 0–5)
HGB BLD-MCNC: 11.4 G/DL (ref 11.5–16)
IMM GRANULOCYTES # BLD AUTO: 0 K/UL (ref 0–0.04)
IMM GRANULOCYTES NFR BLD AUTO: 0 % (ref 0–0.5)
LDLC SERPL CALC-MCNC: 39.6 MG/DL (ref 0–100)
LYMPHOCYTES # BLD: 2.6 K/UL (ref 0.8–3.5)
LYMPHOCYTES NFR BLD: 42 % (ref 12–49)
MCH RBC QN AUTO: 27.1 PG (ref 26–34)
MCHC RBC AUTO-ENTMCNC: 31 G/DL (ref 30–36.5)
MCV RBC AUTO: 87.6 FL (ref 80–99)
MONOCYTES # BLD: 0.4 K/UL (ref 0–1)
MONOCYTES NFR BLD: 7 % (ref 5–13)
NEUTS SEG # BLD: 3 K/UL (ref 1.8–8)
NEUTS SEG NFR BLD: 47 % (ref 32–75)
NRBC # BLD: 0 K/UL (ref 0–0.01)
NRBC BLD-RTO: 0 PER 100 WBC
PLATELET # BLD AUTO: 191 K/UL (ref 150–400)
PMV BLD AUTO: 11.5 FL (ref 8.9–12.9)
POTASSIUM SERPL-SCNC: 4 MMOL/L (ref 3.5–5.1)
PROT SERPL-MCNC: 8 G/DL (ref 6.4–8.2)
RBC # BLD AUTO: 4.2 M/UL (ref 3.8–5.2)
SODIUM SERPL-SCNC: 141 MMOL/L (ref 136–145)
T4 FREE SERPL-MCNC: 1 NG/DL (ref 0.8–1.5)
TRIGL SERPL-MCNC: 62 MG/DL
TSH SERPL DL<=0.05 MIU/L-ACNC: 0.71 UIU/ML (ref 0.36–3.74)
VLDLC SERPL CALC-MCNC: 12.4 MG/DL
WBC # BLD AUTO: 6.3 K/UL (ref 3.6–11)

## 2024-09-10 PROCEDURE — 99213 OFFICE O/P EST LOW 20 MIN: CPT | Performed by: FAMILY MEDICINE

## 2024-09-10 PROCEDURE — PBSHW INFLUENZA, FLUAD TRIVALENT, (AGE 65 Y+), IM, PRESERVATIVE FREE, 0.5ML: Performed by: FAMILY MEDICINE

## 2024-09-10 PROCEDURE — G0008 ADMIN INFLUENZA VIRUS VAC: HCPCS | Performed by: FAMILY MEDICINE

## 2024-09-10 PROCEDURE — 1123F ACP DISCUSS/DSCN MKR DOCD: CPT | Performed by: FAMILY MEDICINE

## 2024-09-10 SDOH — ECONOMIC STABILITY: FOOD INSECURITY: WITHIN THE PAST 12 MONTHS, YOU WORRIED THAT YOUR FOOD WOULD RUN OUT BEFORE YOU GOT MONEY TO BUY MORE.: NEVER TRUE

## 2024-09-10 SDOH — ECONOMIC STABILITY: FOOD INSECURITY: WITHIN THE PAST 12 MONTHS, THE FOOD YOU BOUGHT JUST DIDN'T LAST AND YOU DIDN'T HAVE MONEY TO GET MORE.: NEVER TRUE

## 2024-09-10 SDOH — ECONOMIC STABILITY: INCOME INSECURITY: HOW HARD IS IT FOR YOU TO PAY FOR THE VERY BASICS LIKE FOOD, HOUSING, MEDICAL CARE, AND HEATING?: SOMEWHAT HARD

## 2024-09-16 RX ORDER — CLONIDINE HYDROCHLORIDE 0.3 MG/1
0.3 TABLET ORAL 2 TIMES DAILY
Qty: 180 TABLET | Refills: 3 | Status: SHIPPED | OUTPATIENT
Start: 2024-09-16

## 2024-12-17 RX ORDER — INDAPAMIDE 1.25 MG/1
1.25 TABLET ORAL DAILY
Qty: 90 TABLET | Refills: 3 | Status: SHIPPED | OUTPATIENT
Start: 2024-12-17

## 2024-12-30 RX ORDER — OMEPRAZOLE 40 MG/1
CAPSULE, DELAYED RELEASE ORAL
Qty: 90 CAPSULE | Refills: 3 | Status: SHIPPED | OUTPATIENT
Start: 2024-12-30

## 2025-01-30 RX ORDER — ALENDRONATE SODIUM 70 MG/1
TABLET ORAL
Qty: 12 TABLET | Refills: 3 | Status: SHIPPED | OUTPATIENT
Start: 2025-01-30

## 2025-01-30 NOTE — TELEPHONE ENCOUNTER
Last appointment: 9/10/24  Next appointment: 3/11/25  Previous refill encounter(s): 4/10/24 #12 with 3 refills    Requested Prescriptions     Pending Prescriptions Disp Refills    alendronate (FOSAMAX) 70 MG tablet [Pharmacy Med Name: ALENDRONATE 70MG TABLETS] 12 tablet 3     Sig: TAKE 1 TABLET BY MOUTH EVERY WEEK ON EMPTY STOMACH WITH 8 OZ OF WATER         For Pharmacy Admin Tracking Only    Program: Medication Refill  CPA in place:    Recommendation Provided To:   Intervention Detail: New Rx: 1, reason: Patient Preference  Intervention Accepted By:   Gap Closed?:    Time Spent (min): 5

## 2025-02-26 RX ORDER — AMLODIPINE BESYLATE 10 MG/1
10 TABLET ORAL DAILY
Qty: 90 TABLET | Refills: 3 | Status: SHIPPED | OUTPATIENT
Start: 2025-02-26

## 2025-02-26 NOTE — TELEPHONE ENCOUNTER
Last appointment: 9/10/24  Next appointment: 3/11/25  Previous refill encounter(s): 3/6/24    Requested Prescriptions     Pending Prescriptions Disp Refills    amLODIPine (NORVASC) 10 MG tablet [Pharmacy Med Name: AMLODIPINE BESYLATE 10MG TABLETS] 90 tablet 3     Sig: TAKE 1 TABLET BY MOUTH DAILY         For Pharmacy Admin Tracking Only    Program: Medication Refill  CPA in place:    Recommendation Provided To:   Intervention Detail: New Rx: 1, reason: Patient Preference  Intervention Accepted By:   Gap Closed?:    Time Spent (min): 5

## 2025-03-11 ENCOUNTER — OFFICE VISIT (OUTPATIENT)
Age: 89
End: 2025-03-11
Payer: MEDICARE

## 2025-03-11 VITALS
DIASTOLIC BLOOD PRESSURE: 49 MMHG | WEIGHT: 106 LBS | HEIGHT: 60 IN | HEART RATE: 68 BPM | TEMPERATURE: 98 F | SYSTOLIC BLOOD PRESSURE: 136 MMHG | BODY MASS INDEX: 20.81 KG/M2 | RESPIRATION RATE: 16 BRPM | OXYGEN SATURATION: 98 %

## 2025-03-11 DIAGNOSIS — Z23 ENCOUNTER FOR IMMUNIZATION: ICD-10-CM

## 2025-03-11 DIAGNOSIS — E78.00 HYPERCHOLESTEROLEMIA: ICD-10-CM

## 2025-03-11 DIAGNOSIS — Z00.00 ENCOUNTER FOR MEDICARE ANNUAL WELLNESS EXAM: Primary | ICD-10-CM

## 2025-03-11 DIAGNOSIS — I10 ESSENTIAL HYPERTENSION: ICD-10-CM

## 2025-03-11 DIAGNOSIS — Z23 ENCOUNTER FOR ADMINISTRATION OF COVID-19 VACCINE: ICD-10-CM

## 2025-03-11 LAB
ALBUMIN SERPL-MCNC: 3.8 G/DL (ref 3.5–5)
ALBUMIN/GLOB SERPL: 1.1 (ref 1.1–2.2)
ALP SERPL-CCNC: 82 U/L (ref 45–117)
ALT SERPL-CCNC: 28 U/L (ref 12–78)
ANION GAP SERPL CALC-SCNC: 5 MMOL/L (ref 2–12)
AST SERPL-CCNC: 24 U/L (ref 15–37)
BASOPHILS # BLD: 0.04 K/UL (ref 0–0.1)
BASOPHILS NFR BLD: 0.6 % (ref 0–1)
BILIRUB SERPL-MCNC: 0.3 MG/DL (ref 0.2–1)
BUN SERPL-MCNC: 26 MG/DL (ref 6–20)
BUN/CREAT SERPL: 26 (ref 12–20)
CALCIUM SERPL-MCNC: 9.9 MG/DL (ref 8.5–10.1)
CHLORIDE SERPL-SCNC: 106 MMOL/L (ref 97–108)
CHOLEST SERPL-MCNC: 174 MG/DL
CO2 SERPL-SCNC: 28 MMOL/L (ref 21–32)
CREAT SERPL-MCNC: 1 MG/DL (ref 0.55–1.02)
DIFFERENTIAL METHOD BLD: NORMAL
EOSINOPHIL # BLD: 0.11 K/UL (ref 0–0.4)
EOSINOPHIL NFR BLD: 1.6 % (ref 0–7)
ERYTHROCYTE [DISTWIDTH] IN BLOOD BY AUTOMATED COUNT: 14.3 % (ref 11.5–14.5)
GLOBULIN SER CALC-MCNC: 3.6 G/DL (ref 2–4)
GLUCOSE SERPL-MCNC: 96 MG/DL (ref 65–100)
HCT VFR BLD AUTO: 36.5 % (ref 35–47)
HDLC SERPL-MCNC: 116 MG/DL
HDLC SERPL: 1.5 (ref 0–5)
HGB BLD-MCNC: 11.6 G/DL (ref 11.5–16)
IMM GRANULOCYTES # BLD AUTO: 0.01 K/UL (ref 0–0.04)
IMM GRANULOCYTES NFR BLD AUTO: 0.1 % (ref 0–0.5)
LDLC SERPL CALC-MCNC: 46.2 MG/DL (ref 0–100)
LYMPHOCYTES # BLD: 2.49 K/UL (ref 0.8–3.5)
LYMPHOCYTES NFR BLD: 36.7 % (ref 12–49)
MCH RBC QN AUTO: 27 PG (ref 26–34)
MCHC RBC AUTO-ENTMCNC: 31.8 G/DL (ref 30–36.5)
MCV RBC AUTO: 84.9 FL (ref 80–99)
MONOCYTES # BLD: 0.4 K/UL (ref 0–1)
MONOCYTES NFR BLD: 5.9 % (ref 5–13)
NEUTS SEG # BLD: 3.73 K/UL (ref 1.8–8)
NEUTS SEG NFR BLD: 55.1 % (ref 32–75)
NRBC # BLD: 0 K/UL (ref 0–0.01)
NRBC BLD-RTO: 0 PER 100 WBC
PLATELET # BLD AUTO: 173 K/UL (ref 150–400)
PMV BLD AUTO: 11.2 FL (ref 8.9–12.9)
POTASSIUM SERPL-SCNC: 4.1 MMOL/L (ref 3.5–5.1)
PROT SERPL-MCNC: 7.4 G/DL (ref 6.4–8.2)
RBC # BLD AUTO: 4.3 M/UL (ref 3.8–5.2)
SODIUM SERPL-SCNC: 139 MMOL/L (ref 136–145)
TRIGL SERPL-MCNC: 59 MG/DL
VLDLC SERPL CALC-MCNC: 11.8 MG/DL
WBC # BLD AUTO: 6.8 K/UL (ref 3.6–11)

## 2025-03-11 PROCEDURE — 90480 ADMN SARSCOV2 VAC 1/ONLY CMP: CPT | Performed by: FAMILY MEDICINE

## 2025-03-11 PROCEDURE — 99213 OFFICE O/P EST LOW 20 MIN: CPT | Performed by: FAMILY MEDICINE

## 2025-03-11 PROCEDURE — 90715 TDAP VACCINE 7 YRS/> IM: CPT | Performed by: FAMILY MEDICINE

## 2025-03-11 SDOH — ECONOMIC STABILITY: FOOD INSECURITY: WITHIN THE PAST 12 MONTHS, YOU WORRIED THAT YOUR FOOD WOULD RUN OUT BEFORE YOU GOT MONEY TO BUY MORE.: NEVER TRUE

## 2025-03-11 SDOH — ECONOMIC STABILITY: FOOD INSECURITY: WITHIN THE PAST 12 MONTHS, THE FOOD YOU BOUGHT JUST DIDN'T LAST AND YOU DIDN'T HAVE MONEY TO GET MORE.: NEVER TRUE

## 2025-03-11 ASSESSMENT — PATIENT HEALTH QUESTIONNAIRE - PHQ9
6. FEELING BAD ABOUT YOURSELF - OR THAT YOU ARE A FAILURE OR HAVE LET YOURSELF OR YOUR FAMILY DOWN: NOT AT ALL
5. POOR APPETITE OR OVEREATING: NOT AT ALL
7. TROUBLE CONCENTRATING ON THINGS, SUCH AS READING THE NEWSPAPER OR WATCHING TELEVISION: NOT AT ALL
4. FEELING TIRED OR HAVING LITTLE ENERGY: NOT AT ALL
9. THOUGHTS THAT YOU WOULD BE BETTER OFF DEAD, OR OF HURTING YOURSELF: NOT AT ALL
8. MOVING OR SPEAKING SO SLOWLY THAT OTHER PEOPLE COULD HAVE NOTICED. OR THE OPPOSITE, BEING SO FIGETY OR RESTLESS THAT YOU HAVE BEEN MOVING AROUND A LOT MORE THAN USUAL: NOT AT ALL
3. TROUBLE FALLING OR STAYING ASLEEP: NOT AT ALL
2. FEELING DOWN, DEPRESSED OR HOPELESS: SEVERAL DAYS
SUM OF ALL RESPONSES TO PHQ QUESTIONS 1-9: 3
1. LITTLE INTEREST OR PLEASURE IN DOING THINGS: MORE THAN HALF THE DAYS
10. IF YOU CHECKED OFF ANY PROBLEMS, HOW DIFFICULT HAVE THESE PROBLEMS MADE IT FOR YOU TO DO YOUR WORK, TAKE CARE OF THINGS AT HOME, OR GET ALONG WITH OTHER PEOPLE: NOT DIFFICULT AT ALL
SUM OF ALL RESPONSES TO PHQ QUESTIONS 1-9: 3

## 2025-03-11 ASSESSMENT — LIFESTYLE VARIABLES
HOW MANY STANDARD DRINKS CONTAINING ALCOHOL DO YOU HAVE ON A TYPICAL DAY: PATIENT DOES NOT DRINK
HOW OFTEN DO YOU HAVE A DRINK CONTAINING ALCOHOL: NEVER

## 2025-03-11 NOTE — PROGRESS NOTES
Saroj Sanford (:  1935) is a 89 y.o. female,Established patient, here for evaluation of the following chief complaint(s):  Medicare AWV         Assessment & Plan  Encounter for immunization       Orders:    Tdap, BOOSTRIX, (age 10 yrs+), IM    Encounter for administration of COVID-19 vaccine       Orders:    COVID-19, COMIRNATY, (age 12y+), IM, PF, 30mcg/0.3mL    Hypercholesterolemia       Orders:    Lipid Panel; Future    Essential hypertension   Chronic, at goal (stable), continue current treatment plan    Orders:    Comprehensive Metabolic Panel; Future    CBC with Auto Differential; Future    Encounter for Medicare annual wellness exam              Return in about 6 months (around 2025).       Subjective   HPI  Pt. Comes in for blood pressure and cholesterol check. No complaints of chest pain, shortness of breath, TIAs, claudication or edema. Would want her sister Gloria Vang to be her mPOA if she became incapacitated. Needs medicare wellness exam today.     Goes to bed at 5 pm. Gets up in middle of night. Still lives by herself. Sister plans on walking with her  and .   Needs medicare wellness exam. Sees Dr. Dugan (cardiology), podiatrist (Dr. Bro). Needs covid and tdap. Sounds like has had one dose of shingrix.   Review of Systems   HENT:  Negative for hearing loss.    Neurological:         No falls. Uses a walker. Lives by herself. Sister washes her clothes and cooks for her. Sister helps her take a bath. Getting confused at times.    Psychiatric/Behavioral:          Not depressed. No alcohol.           Objective   Physical Exam  Cardiovascular:      Rate and Rhythm: Normal rate and regular rhythm.      Heart sounds: Normal heart sounds. No murmur heard.  Pulmonary:      Effort: Pulmonary effort is normal.      Breath sounds: Normal breath sounds.   Abdominal:      General: Abdomen is flat. Bowel sounds are normal.      Palpations: Abdomen is soft.

## 2025-03-11 NOTE — PROGRESS NOTES
Chief Complaint   Patient presents with    Medicare AWV       \"Have you been to the ER, urgent care clinic since your last visit?  Hospitalized since your last visit?\"    NO    “Have you seen or consulted any other health care providers outside of [facility] since your last visit?”    NO            Click Here for Release of Records Request       Vitals:    25 1038   BP: (!) 136/49   Pulse: 68   Resp: 16   Temp: 98 °F (36.7 °C)   SpO2: 98%      Health Maintenance Due   Topic Date Due    Shingles vaccine (1 of 2) 2016    Annual Wellness Visit (Medicare Advantage)  2025        The patient, Saroj Sanford, identity was verified by name and .

## 2025-03-15 ENCOUNTER — RESULTS FOLLOW-UP (OUTPATIENT)
Age: 89
End: 2025-03-15

## 2025-03-24 RX ORDER — FUROSEMIDE 20 MG/1
20 TABLET ORAL DAILY
Qty: 30 TABLET | Refills: 6 | Status: SHIPPED | OUTPATIENT
Start: 2025-03-24

## 2025-03-24 NOTE — TELEPHONE ENCOUNTER
Requested Prescriptions     Signed Prescriptions Disp Refills    furosemide (LASIX) 20 MG tablet 30 tablet 6     Sig: TAKE 1 TABLET BY MOUTH DAILY     Authorizing Provider: LEANNA REGALADO III     Ordering User: MARIANELA MOSER      Future Appointments   Date Time Provider Department Center   9/12/2025 10:00 AM Mario Carroll MD [facility] DEP      Per Verbal Order by MD/NP

## 2025-04-21 RX ORDER — ATORVASTATIN CALCIUM 20 MG/1
20 TABLET, FILM COATED ORAL DAILY
Qty: 90 TABLET | Refills: 3 | Status: SHIPPED | OUTPATIENT
Start: 2025-04-21

## 2025-04-21 NOTE — TELEPHONE ENCOUNTER
Last appointment: 3/11/25  Next appointment: 9/12/25  Previous refill encounter(s): 3/6/24    Requested Prescriptions     Pending Prescriptions Disp Refills    atorvastatin (LIPITOR) 20 MG tablet 90 tablet 3     Sig: Take 1 tablet by mouth daily for cholesterol         For Pharmacy Admin Tracking Only    Program: Medication Refill  CPA in place:    Recommendation Provided To:   Intervention Detail: New Rx: 1, reason: Patient Preference  Intervention Accepted By:   Gap Closed?:    Time Spent (min): 5

## 2025-05-15 ENCOUNTER — OFFICE VISIT (OUTPATIENT)
Age: 89
End: 2025-05-15
Payer: MEDICARE

## 2025-05-15 VITALS
OXYGEN SATURATION: 97 % | HEART RATE: 52 BPM | SYSTOLIC BLOOD PRESSURE: 118 MMHG | WEIGHT: 105 LBS | DIASTOLIC BLOOD PRESSURE: 62 MMHG | HEIGHT: 61 IN | BODY MASS INDEX: 19.83 KG/M2

## 2025-05-15 DIAGNOSIS — R00.2 PALPITATIONS: ICD-10-CM

## 2025-05-15 DIAGNOSIS — E78.00 HYPERCHOLESTEREMIA: ICD-10-CM

## 2025-05-15 DIAGNOSIS — I10 ESSENTIAL HYPERTENSION: Primary | ICD-10-CM

## 2025-05-15 DIAGNOSIS — R60.9 DEPENDENT EDEMA: ICD-10-CM

## 2025-05-15 PROCEDURE — 1126F AMNT PAIN NOTED NONE PRSNT: CPT | Performed by: SPECIALIST

## 2025-05-15 PROCEDURE — 1123F ACP DISCUSS/DSCN MKR DOCD: CPT | Performed by: SPECIALIST

## 2025-05-15 PROCEDURE — 1160F RVW MEDS BY RX/DR IN RCRD: CPT | Performed by: SPECIALIST

## 2025-05-15 PROCEDURE — 1159F MED LIST DOCD IN RCRD: CPT | Performed by: SPECIALIST

## 2025-05-15 PROCEDURE — 99214 OFFICE O/P EST MOD 30 MIN: CPT | Performed by: SPECIALIST

## 2025-05-15 ASSESSMENT — PATIENT HEALTH QUESTIONNAIRE - PHQ9
1. LITTLE INTEREST OR PLEASURE IN DOING THINGS: NOT AT ALL
SUM OF ALL RESPONSES TO PHQ QUESTIONS 1-9: 0
SUM OF ALL RESPONSES TO PHQ QUESTIONS 1-9: 0
2. FEELING DOWN, DEPRESSED OR HOPELESS: NOT AT ALL
SUM OF ALL RESPONSES TO PHQ QUESTIONS 1-9: 0
SUM OF ALL RESPONSES TO PHQ QUESTIONS 1-9: 0

## 2025-05-15 NOTE — PROGRESS NOTES
HISTORY OF PRESENT ILLNESS  Saroj Sanford is a 89 y.o. female     SUMMARY:   Patient Active Problem List   Diagnosis    Gait instability    Vertigo    Osteopenia    Hypercholesteremia    Essential hypertension    Pulmonary embolus (HCC)    SBO (small bowel obstruction) (HCC)    Esophageal mass    Palpitations    GERD (gastroesophageal reflux disease)    Respiratory failure (HCC)    Bradycardia    Chronic renal disease, stage III (HCC)            CARDIOLOGY STUDIES TO DATE:  8/16 echo normal lvef, lae, mild tr without pul htn     12/21 holter, occ pac, pvc. Slowest hr 41 bpm, no pauses    Chief Complaint   Patient presents with    Hypertension       HPI :  She continues to do remarkably well.  She only takes the Lasix maybe once a week or so and she does try to elevate her feet when she is sitting and she uses a foot exerciser regularly.  She still living alone and gets around in her apartment with walker in her friend helps her and brings her food and so forth.  No falls recent lipid profile and your other blood work all looked really great.  Blood pressure is well-controlled and she has had no recent palpitations.    CARDIAC ROS:   negative for chest pain, claudication, dyspnea, orthopnea, paroxysmal nocturnal dyspnea, and syncope    Family History   Problem Relation Age of Onset    Heart Attack Neg Hx        Past Medical History:   Diagnosis Date    GERD (gastroesophageal reflux disease) 2007    EGD- reflux esophagitis    Hypercholesterolemia     Hypertension     Osteopenia 11/15/2011    Pulmonary embolus (HCC)     hx. of PE after surgery    Thyroid disease        Specialty Problems          Cardiology Problems    Essential hypertension        Hypercholesteremia        Pulmonary embolus (HCC)        Bradycardia            GENERAL ROS:  A comprehensive review of systems was negative except for what was noted in the HPI.  /62 (BP Site: Left Upper Arm, Patient Position: Sitting, BP Cuff Size:

## 2025-05-15 NOTE — PROGRESS NOTES
1. Have you been to the ER, urgent care clinic since your last visit?  Hospitalized since your last visit?No    2. Have you seen or consulted any other health care providers outside of the [facility] since your last visit?  Include any pap smears or colon screening. No

## 2025-06-30 RX ORDER — LEVOTHYROXINE SODIUM 25 UG/1
TABLET ORAL
Qty: 90 TABLET | Refills: 3 | Status: SHIPPED | OUTPATIENT
Start: 2025-06-30

## 2025-06-30 NOTE — TELEPHONE ENCOUNTER
Last appointment: 3/11/25  Next appointment: 9/12/25  Previous refill encounter(s): 7/1/24    Requested Prescriptions     Pending Prescriptions Disp Refills    levothyroxine (SYNTHROID) 25 MCG tablet 90 tablet 3     Sig: TAKE 1 TABLET BY MOUTH EVERY DAY FOR THYROID         For Pharmacy Admin Tracking Only    Program: Medication Refill  CPA in place:    Recommendation Provided To:   Intervention Detail: New Rx: 1, reason: Patient Preference  Intervention Accepted By:   Gap Closed?:    Time Spent (min): 5